# Patient Record
Sex: FEMALE | Race: WHITE | NOT HISPANIC OR LATINO | Employment: UNEMPLOYED | URBAN - METROPOLITAN AREA
[De-identification: names, ages, dates, MRNs, and addresses within clinical notes are randomized per-mention and may not be internally consistent; named-entity substitution may affect disease eponyms.]

---

## 2018-01-09 ENCOUNTER — HOSPITAL ENCOUNTER (EMERGENCY)
Facility: HOSPITAL | Age: 28
Discharge: HOME | End: 2018-01-09
Attending: EMERGENCY MEDICINE | Admitting: EMERGENCY MEDICINE
Payer: COMMERCIAL

## 2018-01-09 ENCOUNTER — APPOINTMENT (EMERGENCY)
Dept: RADIOLOGY | Facility: HOSPITAL | Age: 28
End: 2018-01-09
Payer: COMMERCIAL

## 2018-01-09 VITALS
HEART RATE: 79 BPM | TEMPERATURE: 98.1 F | HEIGHT: 62 IN | BODY MASS INDEX: 36.8 KG/M2 | OXYGEN SATURATION: 100 % | SYSTOLIC BLOOD PRESSURE: 126 MMHG | DIASTOLIC BLOOD PRESSURE: 91 MMHG | WEIGHT: 200 LBS | RESPIRATION RATE: 20 BRPM

## 2018-01-09 DIAGNOSIS — J45.901 ACUTE ASTHMA EXACERBATION: Primary | ICD-10-CM

## 2018-01-09 LAB
ALBUMIN SERPL BCP-MCNC: 3.9 G/DL (ref 3.5–5)
ALP SERPL-CCNC: 69 U/L (ref 46–116)
ALT SERPL W P-5'-P-CCNC: 57 U/L (ref 12–78)
ANION GAP SERPL CALCULATED.3IONS-SCNC: 10 MMOL/L (ref 4–13)
AST SERPL W P-5'-P-CCNC: 31 U/L (ref 5–45)
ATRIAL RATE: 80 BPM
BASOPHILS # BLD AUTO: 0.04 THOUSANDS/ΜL (ref 0–0.1)
BASOPHILS NFR BLD AUTO: 1 % (ref 0–1)
BILIRUB SERPL-MCNC: 0.3 MG/DL (ref 0.2–1)
BUN SERPL-MCNC: 12 MG/DL (ref 5–25)
CALCIUM SERPL-MCNC: 9.8 MG/DL (ref 8.3–10.1)
CHLORIDE SERPL-SCNC: 102 MMOL/L (ref 100–108)
CO2 SERPL-SCNC: 26 MMOL/L (ref 21–32)
CREAT SERPL-MCNC: 0.62 MG/DL (ref 0.6–1.3)
EOSINOPHIL # BLD AUTO: 0.25 THOUSAND/ΜL (ref 0–0.61)
EOSINOPHIL NFR BLD AUTO: 3 % (ref 0–6)
ERYTHROCYTE [DISTWIDTH] IN BLOOD BY AUTOMATED COUNT: 13.6 % (ref 11.6–15.1)
EXT PREG TEST URINE: NEGATIVE
GFR SERPL CREATININE-BSD FRML MDRD: 124 ML/MIN/1.73SQ M
GLUCOSE SERPL-MCNC: 97 MG/DL (ref 65–140)
HCT VFR BLD AUTO: 41.8 % (ref 34.8–46.1)
HGB BLD-MCNC: 13.9 G/DL (ref 11.5–15.4)
LYMPHOCYTES # BLD AUTO: 1.58 THOUSANDS/ΜL (ref 0.6–4.47)
LYMPHOCYTES NFR BLD AUTO: 18 % (ref 14–44)
MCH RBC QN AUTO: 27.4 PG (ref 26.8–34.3)
MCHC RBC AUTO-ENTMCNC: 33.3 G/DL (ref 31.4–37.4)
MCV RBC AUTO: 82 FL (ref 82–98)
MONOCYTES # BLD AUTO: 0.74 THOUSAND/ΜL (ref 0.17–1.22)
MONOCYTES NFR BLD AUTO: 8 % (ref 4–12)
NEUTROPHILS # BLD AUTO: 6.22 THOUSANDS/ΜL (ref 1.85–7.62)
NEUTS SEG NFR BLD AUTO: 70 % (ref 43–75)
NRBC BLD AUTO-RTO: 0 /100 WBCS
P AXIS: 6 DEGREES
PLATELET # BLD AUTO: 224 THOUSANDS/UL (ref 149–390)
PMV BLD AUTO: 9.8 FL (ref 8.9–12.7)
POTASSIUM SERPL-SCNC: 4.5 MMOL/L (ref 3.5–5.3)
PR INTERVAL: 134 MS
PROT SERPL-MCNC: 7.9 G/DL (ref 6.4–8.2)
QRS AXIS: 32 DEGREES
QRSD INTERVAL: 70 MS
QT INTERVAL: 356 MS
QTC INTERVAL: 410 MS
RBC # BLD AUTO: 5.08 MILLION/UL (ref 3.81–5.12)
SODIUM SERPL-SCNC: 138 MMOL/L (ref 136–145)
T WAVE AXIS: 38 DEGREES
TROPONIN I SERPL-MCNC: <0.02 NG/ML
VENTRICULAR RATE: 80 BPM
WBC # BLD AUTO: 8.85 THOUSAND/UL (ref 4.31–10.16)

## 2018-01-09 PROCEDURE — 80053 COMPREHEN METABOLIC PANEL: CPT | Performed by: EMERGENCY MEDICINE

## 2018-01-09 PROCEDURE — 36415 COLL VENOUS BLD VENIPUNCTURE: CPT | Performed by: EMERGENCY MEDICINE

## 2018-01-09 PROCEDURE — 93005 ELECTROCARDIOGRAM TRACING: CPT | Performed by: EMERGENCY MEDICINE

## 2018-01-09 PROCEDURE — 85025 COMPLETE CBC W/AUTO DIFF WBC: CPT | Performed by: EMERGENCY MEDICINE

## 2018-01-09 PROCEDURE — 81025 URINE PREGNANCY TEST: CPT | Performed by: EMERGENCY MEDICINE

## 2018-01-09 PROCEDURE — 94640 AIRWAY INHALATION TREATMENT: CPT

## 2018-01-09 PROCEDURE — 93005 ELECTROCARDIOGRAM TRACING: CPT

## 2018-01-09 PROCEDURE — 71046 X-RAY EXAM CHEST 2 VIEWS: CPT

## 2018-01-09 PROCEDURE — 99285 EMERGENCY DEPT VISIT HI MDM: CPT

## 2018-01-09 PROCEDURE — 84484 ASSAY OF TROPONIN QUANT: CPT | Performed by: EMERGENCY MEDICINE

## 2018-01-09 RX ORDER — ALBUTEROL SULFATE 90 UG/1
2 AEROSOL, METERED RESPIRATORY (INHALATION) EVERY 4 HOURS PRN
Qty: 1 INHALER | Refills: 0 | Status: SHIPPED | OUTPATIENT
Start: 2018-01-09 | End: 2019-10-01 | Stop reason: ALTCHOICE

## 2018-01-09 RX ORDER — ALBUTEROL SULFATE 2.5 MG/3ML
5 SOLUTION RESPIRATORY (INHALATION) ONCE
Status: COMPLETED | OUTPATIENT
Start: 2018-01-09 | End: 2018-01-09

## 2018-01-09 RX ORDER — ALBUTEROL SULFATE 2.5 MG/3ML
2.5 SOLUTION RESPIRATORY (INHALATION) EVERY 6 HOURS PRN
Qty: 75 ML | Refills: 0 | Status: SHIPPED | OUTPATIENT
Start: 2018-01-09 | End: 2019-10-01 | Stop reason: ALTCHOICE

## 2018-01-09 RX ORDER — PREDNISONE 20 MG/1
60 TABLET ORAL ONCE
Status: COMPLETED | OUTPATIENT
Start: 2018-01-09 | End: 2018-01-09

## 2018-01-09 RX ORDER — PREDNISONE 20 MG/1
40 TABLET ORAL DAILY
Qty: 8 TABLET | Refills: 0 | Status: SHIPPED | OUTPATIENT
Start: 2018-01-09 | End: 2018-01-13

## 2018-01-09 RX ADMIN — ALBUTEROL SULFATE 5 MG: 2.5 SOLUTION RESPIRATORY (INHALATION) at 11:10

## 2018-01-09 RX ADMIN — PREDNISONE 60 MG: 20 TABLET ORAL at 11:09

## 2018-01-09 RX ADMIN — IPRATROPIUM BROMIDE 0.5 MG: 0.5 SOLUTION RESPIRATORY (INHALATION) at 11:10

## 2018-01-09 NOTE — ED NOTES
Pt up in bed  Instructed on plan of care  Pt verbalizes understanding  Support provided       Yasmeen Balderrama, RN  01/09/18 7898

## 2018-01-09 NOTE — DISCHARGE INSTRUCTIONS
Asthma   WHAT YOU NEED TO KNOW:   Asthma is a lung disease that makes breathing difficult  Chronic inflammation and reactions to triggers narrow the airways in the lungs  Asthma can become life-threatening if it is not managed  DISCHARGE INSTRUCTIONS:   Return to the emergency department if:   · You have severe shortness of breath  · Your lips or nails turn blue or gray  · The skin around your neck and ribs pulls in with each breath  · You have shortness of breath, even after you take your short-term medicine as directed  · Your peak flow numbers are in the red zone of your AAP  Contact your healthcare provider if:   · You run out of medicine before your next refill is due  · Your symptoms get worse  · You need to take more medicine than usual to control your symptoms  · You have questions or concerns about your condition or care  Medicines:   · Medicines  decrease inflammation, open airways, and make it easier to breathe  Medicines may be inhaled, taken as a pill, or injected  Short-term medicines relieve your symptoms quickly  Long-term medicines are used to prevent future attacks  You may also need medicine to help control your allergies  Ask your healthcare provider for more information about the medicine you are given and how to take it safely  · Take your medicine as directed  Contact your healthcare provider if you think your medicine is not helping or if you have side effects  Tell him of her if you are allergic to any medicine  Keep a list of the medicines, vitamins, and herbs you take  Include the amounts, and when and why you take them  Bring the list or the pill bottles to follow-up visits  Carry your medicine list with you in case of an emergency  Follow up with your healthcare provider as directed: You will need to return to make sure your medicine is working and your symptoms are controlled   You may be referred to an asthma specialist  Willard Rader may be asked to keep a record of your peak flow values and bring it with you to your appointments  Write down your questions so you remember to ask them during your visits  Manage your symptoms and prevent future attacks:   · Follow your Asthma Action Plan (AAP)  This is a written plan that you and your healthcare provider create  It explains which medicine you need and when to change doses if necessary  It also explains how you can monitor symptoms and use a peak flow meter  The meter measures how well your lungs are working  · Manage other health conditions , such as allergies, acid reflux, and sleep apnea  · Identify and avoid triggers  These may include pets, dust mites, mold, and cockroaches  · Do not smoke or be around others who smoke  Nicotine and other chemicals in cigarettes and cigars can cause lung damage  Ask your healthcare provider for information if you currently smoke and need help to quit  E-cigarettes or smokeless tobacco still contain nicotine  Talk to your healthcare provider before you use these products  · Ask about the flu vaccine  The flu can make your asthma worse  You may need a yearly flu shot  © 2017 [address] [facility] Information is for End User's use only and may not be sold, redistributed or otherwise used for commercial purposes  All illustrations and images included in CareNotes® are the copyrighted property of A D A M , Inc  or Wolfgang Tracy  The above information is an  only  It is not intended as medical advice for individual conditions or treatments  Talk to your doctor, nurse or pharmacist before following any medical regimen to see if it is safe and effective for you

## 2018-01-28 NOTE — ED PROVIDER NOTES
History  Chief Complaint   Patient presents with    Chest Pain     Pt stated that she has been having on and off chest pain for the past two weeks  This is a 31 y/o female that presents to the ED with c/o chest pain  Also with cough and wheezing  Chest pain worse with cough  No fevers  Hx of asthma  Symptoms presents for the past two weeks  Also c/o intermittent arm tingling with symptoms  No weakness  Symptoms are moderate in severity  No aggravating or alleviating factors  No other associated factors  Non-exertional              None       Past Medical History:   Diagnosis Date    Asthma        Past Surgical History:   Procedure Laterality Date     SECTION         History reviewed  No pertinent family history  I have reviewed and agree with the history as documented  Social History   Substance Use Topics    Smoking status: Current Some Day Smoker     Packs/day: 0 25    Smokeless tobacco: Never Used    Alcohol use No        Review of Systems   Constitutional: Negative for activity change, appetite change and fever  HENT: Negative for congestion, ear pain, rhinorrhea and sore throat  Eyes: Negative for pain, discharge and redness  Respiratory: Positive for cough, chest tightness, shortness of breath and wheezing  Cardiovascular: Positive for chest pain  Negative for palpitations  Gastrointestinal: Negative for abdominal pain, diarrhea, nausea and vomiting  Endocrine: Negative for polyuria  Genitourinary: Negative for difficulty urinating, dysuria, frequency and urgency  Musculoskeletal: Negative for arthralgias and myalgias  Skin: Negative for color change and rash  Allergic/Immunologic: Negative for immunocompromised state  Neurological: Positive for numbness  Negative for dizziness, syncope and light-headedness  Headaches: tingling  Hematological: Does not bruise/bleed easily  Psychiatric/Behavioral: Negative for confusion     All other systems reviewed and are negative  Physical Exam  ED Triage Vitals [01/09/18 0910]   Temperature Pulse Respirations Blood Pressure SpO2   98 1 °F (36 7 °C) 88 16 154/93 99 %      Temp Source Heart Rate Source Patient Position - Orthostatic VS BP Location FiO2 (%)   Oral Monitor Sitting Right arm --      Pain Score       5           Orthostatic Vital Signs  Vitals:    01/09/18 0910 01/09/18 1210 01/09/18 1300   BP: 154/93 126/91    Pulse: 88 91 79   Patient Position - Orthostatic VS: Sitting Sitting        Physical Exam   Constitutional: She is oriented to person, place, and time  She appears well-developed  No distress  HENT:   Head: Normocephalic and atraumatic  Nose: Nose normal    Eyes: Conjunctivae are normal  No scleral icterus  Neck: Normal range of motion  Neck supple  Cardiovascular: Normal rate, regular rhythm, normal heart sounds and intact distal pulses  Pulmonary/Chest: Effort normal  No stridor  No respiratory distress  She has wheezes  Abdominal: Soft  She exhibits no distension  There is no tenderness  There is no rebound and no guarding  Musculoskeletal: She exhibits no edema or deformity  Neurological: She is alert and oriented to person, place, and time  Skin: Skin is warm and dry  No rash noted  Psychiatric: She has a normal mood and affect  Thought content normal    Nursing note and vitals reviewed        ED Medications  Medications   albuterol inhalation solution 5 mg (5 mg Nebulization Given 1/9/18 1110)   ipratropium (ATROVENT) 0 02 % inhalation solution 0 5 mg (0 5 mg Nebulization Given 1/9/18 1110)   predniSONE tablet 60 mg (60 mg Oral Given 1/9/18 1109)       Diagnostic Studies  Results Reviewed     Procedure Component Value Units Date/Time    Troponin I [[id number]]  (Normal) Collected:  01/09/18 1046    Lab Status:  Final result Specimen:  Blood from Arm, Left Updated:  01/09/18 1125     Troponin I <0 02 ng/mL     Narrative:         [employer] Chemistry analyzer 99% cutoff is > 0 04 ng/mL in network labs    o cTnI 99% cutoff is useful only when applied to patients in the clinical setting of myocardial ischemia  o cTnI 99% cutoff should be interpreted in the context of clinical history, ECG findings and possibly cardiac imaging to establish correct diagnosis  o cTnI 99% cutoff may be suggestive but clearly not indicative of a coronary event without the clinical setting of myocardial ischemia  Comprehensive metabolic panel [[id number]] Collected:  01/09/18 1046    Lab Status:  Final result Specimen:  Blood from Arm, Left Updated:  01/09/18 1123     Sodium 138 mmol/L      Potassium 4 5 mmol/L      Chloride 102 mmol/L      CO2 26 mmol/L      Anion Gap 10 mmol/L      BUN 12 mg/dL      Creatinine 0 62 mg/dL      Glucose 97 mg/dL      Calcium 9 8 mg/dL      AST 31 U/L      ALT 57 U/L      Alkaline Phosphatase 69 U/L      Total Protein 7 9 g/dL      Albumin 3 9 g/dL      Total Bilirubin 0 30 mg/dL      eGFR 124 ml/min/1 73sq m     Narrative:         National Kidney Disease Education Program recommendations are as follows:  GFR calculation is accurate only with a steady state creatinine  Chronic Kidney disease less than 60 ml/min/1 73 sq  meters  Kidney failure less than 15 ml/min/1 73 sq  meters      CBC and differential [[id number]]  (Normal) Collected:  01/09/18 1046    Lab Status:  Final result Specimen:  Blood from Arm, Left Updated:  01/09/18 1052     WBC 8 85 Thousand/uL      RBC 5 08 Million/uL      Hemoglobin 13 9 g/dL      Hematocrit 41 8 %      MCV 82 fL      MCH 27 4 pg      MCHC 33 3 g/dL      RDW 13 6 %      MPV 9 8 fL      Platelets 229 Thousands/uL      nRBC 0 /100 WBCs      Neutrophils Relative 70 %      Lymphocytes Relative 18 %      Monocytes Relative 8 %      Eosinophils Relative 3 %      Basophils Relative 1 %      Neutrophils Absolute 6 22 Thousands/µL      Lymphocytes Absolute 1 58 Thousands/µL      Monocytes Absolute 0 74 Thousand/µL      Eosinophils Absolute 0 25 Thousand/µL Basophils Absolute 0 04 Thousands/µL     POCT pregnancy, urine [[id number]]  (Normal) Resulted:  01/09/18 1040    Lab Status:  Final result Updated:  01/09/18 1040     EXT PREG TEST UR (Ref: Negative) negative                 X-ray chest 2 views   Final Result by Veena Mata MD (01/09 1104)      No active pulmonary disease  Workstation performed: [id number]                    Procedures  Procedures       Phone Contacts  ED Phone Contact    ED Course  ED Course                                MDM  CritCare Time    Disposition  Final diagnoses:   Acute asthma exacerbation     Time reflects when diagnosis was documented in both MDM as applicable and the Disposition within this note     Time User Action Codes Description Comment    1/9/2018 12:52 PM [place] Later Add [L21 024] Acute asthma exacerbation       ED Disposition     ED Disposition Condition Comment    Discharge  THE [facility] discharge to home/self care  Condition at discharge: Stable        Follow-up Information     Follow up With Specialties Details Why Contact Info    Juanpablo Santana MD  In 1 week If symptoms worsen [address] [place] [address] 16  [phone]          Discharge Medication List as of 1/9/2018 12:54 PM      START taking these medications    Details   albuterol (2 5 mg/3 mL) 0 083 % nebulizer solution Take 3 mL by nebulization every 6 (six) hours as needed for wheezing, Starting Tue 1/9/2018, Print      albuterol (PROVENTIL HFA,VENTOLIN HFA) 90 mcg/act inhaler Inhale 2 puffs every 4 (four) hours as needed for wheezing, Starting Tue 1/9/2018, Print      predniSONE 20 mg tablet Take 2 tablets by mouth daily for 4 days, Starting Tue 1/9/2018, Until Sat 1/13/2018, Print           No discharge procedures on file      ED Provider  Electronically Signed by           Ata Jensen MD  01/28/18 7409

## 2018-02-12 ENCOUNTER — HOSPITAL ENCOUNTER (EMERGENCY)
Facility: HOSPITAL | Age: 28
Discharge: HOME | End: 2018-02-12
Attending: EMERGENCY MEDICINE | Admitting: EMERGENCY MEDICINE
Payer: COMMERCIAL

## 2018-02-12 VITALS
RESPIRATION RATE: 16 BRPM | OXYGEN SATURATION: 99 % | WEIGHT: 200 LBS | SYSTOLIC BLOOD PRESSURE: 128 MMHG | HEIGHT: 62 IN | TEMPERATURE: 98.1 F | BODY MASS INDEX: 36.8 KG/M2 | DIASTOLIC BLOOD PRESSURE: 82 MMHG | HEART RATE: 75 BPM

## 2018-02-12 DIAGNOSIS — S09.90XA MILD CLOSED HEAD INJURY, INITIAL ENCOUNTER: ICD-10-CM
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
## 2018-02-12 DIAGNOSIS — S13.4XXA WHIPLASH INJURIES, INITIAL ENCOUNTER: ICD-10-CM

## 2018-02-12 DIAGNOSIS — M62.838 MUSCLE SPASM: ICD-10-CM

## 2018-02-12 PROCEDURE — 99283 EMERGENCY DEPT VISIT LOW MDM: CPT

## 2018-02-12 RX ORDER — CYCLOBENZAPRINE HCL 5 MG
5 TABLET ORAL 3 TIMES DAILY PRN
Qty: 21 TABLET | Refills: 0 | Status: SHIPPED | OUTPATIENT
Start: 2018-02-12 | End: 2019-02-01

## 2018-02-12 NOTE — DISCHARGE INSTRUCTIONS
Take ibuprofen (Motrin, Advil) or acetaminophen (Tylenol) as needed for pain, as per the instructions  Use the prescribed muscle relaxer as needed for continued severe pain  Use ice the first 24 hours, and heat thereafter  Use topical muscle rubs, such as Ramez-Tyler, or icy Hot to the area, to help with the pain  Do gentle stretching exercises to keep the muscles loose  Muscle Spasm   WHAT YOU NEED TO KNOW:   A muscle spasm is a sudden contraction of any muscle or group of muscles  A muscle cramp is a painful muscle spasm  Muscle cramps commonly occur after intense exercise or during pregnancy  They may also be caused by certain medications, dehydration, low calcium or magnesium levels, or another medical condition  DISCHARGE INSTRUCTIONS:   Medicines: You may need the following:  · NSAIDs  help decrease swelling and pain or fever  This medicine is available with or without a doctor's order  NSAIDs can cause stomach bleeding or kidney problems in certain people  If you take blood thinner medicine, always ask your healthcare provider if NSAIDs are safe for you  Always read the medicine label and follow directions  · Take your medicine as directed  Contact your healthcare provider if you think your medicine is not helping or if you have side effects  Tell him of her if you are allergic to any medicine  Keep a list of the medicines, vitamins, and herbs you take  Include the amounts, and when and why you take them  Bring the list or the pill bottles to follow-up visits  Carry your medicine list with you in case of an emergency  Follow up with your healthcare provider as directed: You may need other tests or treatment  You may also be referred to a physical therapist or other specialist  Write down your questions so you remember to ask them during your visits  Self-care:   · Stretch  your muscle to help relieve the cramp   It may be helpful to keep your muscle in the stretched position until the cramp is gone      · Apply heat  to help decrease pain and muscle spasms  Apply heat on the area for 20 to 30 minutes every 2 hours for as many days as directed  · Apply ice  to help decrease swelling and pain  Ice may also help prevent tissue damage  Use an ice pack, or put crushed ice in a plastic bag  Cover it with a towel and place it on your muscle for 15 to 20 minutes every hour or as directed  · Drink more liquids  to help prevent muscle cramps caused by dehydration  Sports drinks may help replace electrolytes you lose through sweat during exercise  Ask your healthcare provider how much liquid to drink each day and which liquids are best for you  · Eat healthy foods , such as fruits, vegetables, whole grains, low-fat dairy products, and lean proteins (meat, beans, and fish)  If you are pregnant, ask your healthcare provider about foods that are high in magnesium and sodium  They may help to relieve cramps during pregnancy  · Massage your muscle  to help relieve the cramp  · Take frequent deep breaths  until the cramp feels better  Lie down while you take the deep breaths so you do not get dizzy or lightheaded  Contact your healthcare provider if:   · You have signs of dehydration, such as a headache, dark yellow urine, dry eyes or mouth, or a fast heartbeat  · You have questions or concerns about your condition or care  Return to the emergency department if:   · You have warmth, swelling, or redness in the cramping muscle  · You have frequent or unrelieved muscle cramps in several different muscles  · You have muscle cramps with numbness, tingling, and burning in your hands and feet  © 2017 [address] [facility] Information is for End User's use only and may not be sold, redistributed or otherwise used for commercial purposes  All illustrations and images included in CareNotes® are the copyrighted property of [employer]  or Wolfgang Tracy    The above information is an  only  It is not intended as medical advice for individual conditions or treatments  Talk to your doctor, nurse or pharmacist before following any medical regimen to see if it is safe and effective for you  Post Concussion Syndrome   WHAT YOU NEED TO KNOW:   Post-concussion syndrome (PCS) is a group of symptoms that affect your nerves, thinking, and behavior  PCS develops shortly after a concussion and can last for weeks to months  DISCHARGE INSTRUCTIONS:   Call 911 or have someone else call for any of the following:   · You have a seizure  · You have trouble breathing  · You are not responding or you cannot be woken  Return to the emergency department if:   · You have a sudden headache that seems different or much worse than your usual headaches  · You cannot stop vomiting  · You have sudden changes in your vision  Contact your healthcare provider if:   · You have nausea or are vomiting  · You have trouble concentrating  · You have difficulty speaking or thinking  · Your symptoms get worse  · You have questions or concerns about your condition or care  Medicines: You may  need any of the following:  · Acetaminophen  decreases pain  It is available without a doctor's order  Ask how much to take and how often to take it  Follow directions  Acetaminophen can cause liver damage if not taken correctly  · NSAIDs,  such as ibuprofen, help decrease swelling, pain, and fever  This medicine is available without a doctor's order  Follow directions  NSAIDs can cause stomach bleeding or kidney problems if not taken correctly  If you take blood thinner medicine, always ask if NSAIDs are safe for you  · Antidepressants  may be given for depression or sleep problems  · Migraine medicines  may be given for migraine headaches  · NSAIDs , such as ibuprofen, help decrease swelling, pain, and fever  This medicine is available with or without a doctor's order  NSAIDs can cause stomach bleeding or kidney problems in certain people  If you take blood thinner medicine, always ask if NSAIDs are safe for you  Always read the medicine label and follow directions  Do not give these medicines to children under 10months of age without direction from your child's healthcare provider  Follow up with your healthcare provider as directed: Your healthcare provider may refer you to psychiatrist, a neurologist, or a substance abuse counselor  Write down your questions so you remember to ask them during your visits  Prevent PCS:   · Make your home safe  Home safety measures can help prevent head injuries that could lead to a concussion  Install handrails for every staircase  Put soft bumpers on furniture edges and corners  Secure furniture, such as dressers and book cases so they do not fall over  · Always wear a seatbelt in the car  This helps decrease your risk for a head injury if you are in a car accident  · Wear protective sports equipment that fits properly  Helmets help decrease your risk for a serious brain injury  Talk to your healthcare provider about other ways that you can decrease your risk for a concussion if you play sports  Manage your symptoms:   · Rest  from physical and mental activities as directed  Mental activities need you to think, concentrate, and pay attention  Rest will help you recover from your concussion  Ask your healthcare provider when you can return to school and other daily activities  · Go to therapy  as directed  A cognitive behavioral therapist teaches you skills to help with any thinking and behavior problems you may have  An occupational therapist teaches your skills to help with daily activities  · Do not participate in sports or physical activities  until your healthcare provider says it is okay  These activities could make your symptoms worse or lead to another concussion   Your healthcare provider will tell you when it is okay to return to sports or physical activities  © 2017 [address] [employer] Information is for End User's use only and may not be sold, redistributed or otherwise used for commercial purposes  All illustrations and images included in CareNotes® are the copyrighted property of [employer] M [employer] Inc  or Wolfgang Tracy  The above information is an  only  It is not intended as medical advice for individual conditions or treatments  Talk to your doctor, nurse or pharmacist before following any medical regimen to see if it is safe and effective for you

## 2018-02-12 NOTE — ED PROVIDER NOTES
History  Chief Complaint   Patient presents with    Fall     pt slipped on ice and fell  Pt hit her head  c/o left shoulder and neck pain  Denies loc  HPI    Prior to Admission Medications   Prescriptions Last Dose Informant Patient Reported? Taking? albuterol (2 5 mg/3 mL) 0 083 % nebulizer solution   No No   Sig: Take 3 mL by nebulization every 6 (six) hours as needed for wheezing   albuterol (PROVENTIL HFA,VENTOLIN HFA) 90 mcg/act inhaler   No No   Sig: Inhale 2 puffs every 4 (four) hours as needed for wheezing      Facility-Administered Medications: None       Past Medical History:   Diagnosis Date    Asthma        Past Surgical History:   Procedure Laterality Date     SECTION         History reviewed  No pertinent family history  I have reviewed and agree with the history as documented  Social History   Substance Use Topics    Smoking status: Former Smoker     Packs/day: 0 25    Smokeless tobacco: Never Used    Alcohol use No      Comment: occ        Review of Systems    Physical Exam  ED Triage Vitals [18 1116]   Temperature Pulse Respirations Blood Pressure SpO2   98 1 °F (36 7 °C) 84 18 (!) 158/102 98 %      Temp Source Heart Rate Source Patient Position - Orthostatic VS BP Location FiO2 (%)   Oral Monitor Sitting Right arm --      Pain Score       6           Orthostatic Vital Signs  Vitals:    18 1116   BP: (!) 158/102   Pulse: 84   Patient Position - Orthostatic VS: Sitting       Physical Exam   Constitutional: She is oriented to person, place, and time  She appears well-developed and well-nourished  No distress  HENT:   Head: Normocephalic and atraumatic  Mouth/Throat: Oropharynx is clear and moist    Eyes: Conjunctivae and EOM are normal  Pupils are equal, round, and reactive to light  Neck: Normal range of motion and full passive range of motion without pain  Neck supple  Muscular tenderness present  No spinous process tenderness present   No tracheal deviation present  Cardiovascular: Normal rate, regular rhythm and intact distal pulses  Pulses:       Radial pulses are 2+ on the right side  Pulmonary/Chest: Effort normal  No respiratory distress  She exhibits no tenderness, no crepitus, no deformity and no retraction  Abdominal: Soft  She exhibits no distension  There is no tenderness  Musculoskeletal: Normal range of motion  She exhibits no deformity  Left shoulder: She exhibits tenderness and spasm  She exhibits normal range of motion and no bony tenderness  Thoracic back: She exhibits tenderness and spasm  She exhibits no bony tenderness  Back:         Arms:  Neurological: She is alert and oriented to person, place, and time  She has normal strength  No cranial nerve deficit or sensory deficit  GCS eye subscore is 4  GCS verbal subscore is 5  GCS motor subscore is 6  Skin: Skin is warm and dry  No abrasion, no bruising, no ecchymosis and no laceration noted  Psychiatric: She has a normal mood and affect  Her behavior is normal    Nursing note and vitals reviewed  ED Medications  Medications - No data to display    Diagnostic Studies  Results Reviewed     None                 No orders to display              Procedures  Procedures       Phone Contacts  ED Phone Contact    ED Course  ED Course                                MDM  Number of Diagnoses or Management Options  Fall due to slipping on ice or snow, initial encounter: new and does not require workup  Mild closed head injury, initial encounter: new and does not require workup  Muscle spasm: new and does not require workup  Whiplash injuries, initial encounter: new and does not require workup  Diagnosis management comments: This is a 68-year-old female who presents here today after a fall  At about 0830 this morning she slipped on ice in her driveway and landed on her back  She thinks she might have hit her head on the ground    She is endorsing pain primarily in her left posterior shoulder, lower neck, and upper back  She denies loss of consciousness, has had nausea which is improving, but no vomiting  She has no vision changes, focal weakness or numbness  She denies pain in her chest or abdomen  She has no underlying medical problems and does not take any blood thinning medications  She took 400 milligrams of Motrin shortly after the fall which did not help  She is here because of continued pain  ROS: Otherwise negative, unless stated as above  She is well-appearing, in no acute distress  She has tenderness and muscle spasm to the base of her left neck, trapezius, and thoracic back  There is no midline tenderness or focal bony tenderness  The tenderness to her left shoulder is over the posterior musculature  There is no bony tenderness to this area, or no decreased range of motion  There are no food focal neurologic deficits  This is most consistent with whiplash injury and muscle strain with spasm from the fall  I am not concerned about underlying bony injury or intracranial hemorrhage  I do not feel that any imaging is indicated at this point in time  I discussed with the patient and family treatment at home, follow-up, and indications for return, and they expressed understanding with this plan  CritCare Time    Disposition  Final diagnoses:   Fall due to slipping on ice or snow, initial encounter   Whiplash injuries, initial encounter   Muscle spasm   Mild closed head injury, initial encounter     Time reflects when diagnosis was documented in both MDM as applicable and the Disposition within this note     Time User Action Codes Description Comment    2/12/2018 12:32 PM Faith Ledesma Add [W00  9XXA] Fall due to slipping on ice or snow, initial encounter     2/12/2018 12:35 PM Faith Ledesma Add [S13  4XXA] Whiplash injuries, initial encounter     2/12/2018 12:35 PM Faith Ledesma Add [X80 038] Muscle spasm     2/12/2018 12:37 PM Callie Shania Ring Add [S09 90XA] Mild closed head injury, initial encounter       ED Disposition     ED Disposition Condition Comment    Discharge  [facility] [place] discharge to home/self care  Condition at discharge: Good        Follow-up Information     Follow up With Specialties Details Why Contact Tevin Kent MD  Schedule an appointment as soon as possible for a visit in 3 days As needed [address] [place] [address] 16  [phone]          Patient's Medications   Discharge Prescriptions    CYCLOBENZAPRINE (FLEXERIL) 5 MG TABLET    Take 1 tablet (5 mg total) by mouth 3 (three) times a day as needed for muscle spasms       Start Date: 2/12/2018 End Date: --       Order Dose: 5 mg       Quantity: 21 tablet    Refills: 0     No discharge procedures on file      ED Provider  Electronically Signed by           Zoya Fonseca MD  02/15/18 1204

## 2018-02-26 ENCOUNTER — OFFICE VISIT (OUTPATIENT)
Dept: URGENT CARE | Facility: CLINIC | Age: 28
End: 2018-02-26
Payer: COMMERCIAL

## 2018-02-26 VITALS
WEIGHT: 202.6 LBS | DIASTOLIC BLOOD PRESSURE: 73 MMHG | TEMPERATURE: 98.7 F | HEIGHT: 62 IN | RESPIRATION RATE: 16 BRPM | BODY MASS INDEX: 37.28 KG/M2 | OXYGEN SATURATION: 97 % | SYSTOLIC BLOOD PRESSURE: 111 MMHG | HEART RATE: 113 BPM

## 2018-02-26 DIAGNOSIS — R50.9 FEVER, UNSPECIFIED FEVER CAUSE: Primary | ICD-10-CM

## 2018-02-26 PROCEDURE — G0382 LEV 3 HOSP TYPE B ED VISIT: HCPCS | Performed by: PHYSICIAN ASSISTANT

## 2018-02-26 PROCEDURE — 87798 DETECT AGENT NOS DNA AMP: CPT | Performed by: PHYSICIAN ASSISTANT

## 2018-02-26 PROCEDURE — 99283 EMERGENCY DEPT VISIT LOW MDM: CPT | Performed by: PHYSICIAN ASSISTANT

## 2018-02-26 RX ORDER — OSELTAMIVIR PHOSPHATE 75 MG/1
75 CAPSULE ORAL 2 TIMES DAILY
Qty: 10 CAPSULE | Refills: 0 | Status: SHIPPED | OUTPATIENT
Start: 2018-02-26 | End: 2018-03-03

## 2018-02-26 NOTE — PROGRESS NOTES
[facility]        NAME: Tucker Haywood is a 29 y o  female  : 1990    MRN: [id number]  DATE: 2018  TIME: 10:30 AM    Assessment and Plan   Fever, unspecified fever cause [R50 9]  1  Fever, unspecified fever cause  oseltamivir (TAMIFLU) 75 mg capsule    Influenza A/B and RSV by PCR (Indicated for patients > 2 mo of age)      benign exam here  Patient Instructions       Alternate Tylenol and Motrin for fever  Continue DayQuil and NyQuil  We will check a flu swab and can start Tamiflu  I will call with flu results  Follow up with PCP in 3-5 days  Proceed to  ER if symptoms worsen  Chief Complaint     Chief Complaint   Patient presents with    Cough     started saturday    Fever     started saturday, tmax 102 3         History of Present Illness         29year-old female complains of fever body aches cough and congestion for 2 days  Her daughter is here and she is sick also  No flu shot this year  She took Tylenol this morning  No nausea vomiting  She had loose stool twice yesterday but normal bowel movement today  Tolerating p o    No rashes        Review of Systems   Review of Systems      Current Medications       Current Outpatient Prescriptions:     albuterol (2 5 mg/3 mL) 0 083 % nebulizer solution, Take 3 mL by nebulization every 6 (six) hours as needed for wheezing, Disp: 75 mL, Rfl: 0    albuterol (PROVENTIL HFA,VENTOLIN HFA) 90 mcg/act inhaler, Inhale 2 puffs every 4 (four) hours as needed for wheezing, Disp: 1 Inhaler, Rfl: 0    cyclobenzaprine (FLEXERIL) 5 mg tablet, Take 1 tablet (5 mg total) by mouth 3 (three) times a day as needed for muscle spasms, Disp: 21 tablet, Rfl: 0    oseltamivir (TAMIFLU) 75 mg capsule, Take 1 capsule (75 mg total) by mouth 2 (two) times a day for 5 days, Disp: 10 capsule, Rfl: 0    Current Allergies     Allergies as of 2018 - Reviewed 2018   Allergen Reaction Noted    Apple Anaphylaxis 2017    Nuts Other (See Comments) 2017    Pineapple Anaphylaxis 2017    Shellfish-derived products Anaphylaxis 2017    Other Hives and Rash 2017    Raspberry Hives and Rash 2017    Penicillins Rash 10/06/2013            The following portions of the patient's history were reviewed and updated as appropriate: allergies, current medications, past family history, past medical history, past social history, past surgical history and problem list      Past Medical History:   Diagnosis Date    Asthma        Past Surgical History:   Procedure Laterality Date     SECTION         History reviewed  No pertinent family history  Medications have been verified  Objective   /73   Pulse (!) 113   Temp 98 7 °F (37 1 °C) (Tympanic)   Resp 16   Ht 5' 2" (1 575 m)   Wt 91 9 kg (202 lb 9 6 oz)   SpO2 97%   BMI 37 06 kg/m²        Physical Exam     Physical Exam   Constitutional: She appears well-developed and well-nourished  No distress  HENT:   Right Ear: Tympanic membrane, external ear and ear canal normal    Left Ear: Tympanic membrane, external ear and ear canal normal    Nose: Nose normal  Right sinus exhibits no maxillary sinus tenderness and no frontal sinus tenderness  Left sinus exhibits no maxillary sinus tenderness and no frontal sinus tenderness  Mouth/Throat: Oropharynx is clear and moist  No posterior oropharyngeal erythema  Eyes: Conjunctivae and EOM are normal  Pupils are equal, round, and reactive to light  No scleral icterus  Neck: Normal range of motion  Neck supple  Cardiovascular: Normal rate, regular rhythm and normal heart sounds  Pulmonary/Chest: Effort normal and breath sounds normal  No respiratory distress  She has no wheezes  She has no rales  Abdominal: Soft  Bowel sounds are normal  She exhibits no distension and no mass  There is no tenderness  There is no rebound and no guarding  Lymphadenopathy:     She has no cervical adenopathy  Skin: Skin is warm and dry  No rash noted

## 2018-02-26 NOTE — LETTER
February 26, 2018     Patient: Prosper Khoury   YOB: 1990   Date of Visit: 2/26/2018       To Whom it May Concern:    Prosper Khoury is under my professional care  She was seen in my office on 2/26/2018  She may return to work on 2/28/18  If you have any questions or concerns, please don't hesitate to call           Sincerely,          González Soto PA-C        CC: No Recipients

## 2018-02-26 NOTE — PATIENT INSTRUCTIONS
Alternate Tylenol and Motrin for fever  Continue DayQuil and NyQuil  We will check a flu swab and can start Tamiflu  I will call with flu results  Follow up with PCP in 3-5 days  Proceed to  ER if symptoms worsen  Cold Symptoms, Ambulatory Care   GENERAL INFORMATION:   Cold symptoms  include sneezing, dry throat, a stuffy nose, headache, watery eyes, and a cough  Your cough may be dry, or you may cough up mucus  You may also have muscle aches, joint pain, and tiredness  Rarely, you may have a fever  Cold symptoms occur from inflammation in your upper respiratory system caused by a virus  Most colds go away without treatment  Seek immediate care for the following symptoms:   · A heartbeat that is much faster than usual for you     · A swollen neck that is sore to the touch     · Increased tiredness and weakness    · Pinpoint or larger reddish-purple dots on your skin     · Poor or no appetite  Treatment for cold symptoms  may include NSAIDS to decrease muscle aches and fever  Do not give NSAID medicines to children under 10months of age without direction from your child's doctor  Cold medicines may also be given to decrease coughing, nasal stuffiness, sneezing, and a runny nose  Do not give cold medicines to children under 11years of age without direction from your child's doctor  Manage your cold symptoms with the following:   · Drink liquids  to help thin and loosen thick mucus so you can cough it up  Liquids will also keep you hydrated  Ask your healthcare provider which liquids are best for you and how much to drink each day  · Do not smoke  because it may worsen your symptoms and increase the length of time you feel sick  Talk with your healthcare provider if you need help to stop smoking  Prevent the spread of germs  by washing your hands often  You can spread your cold germs to others for at least 3 days after your symptoms start  Do not share items, such as eating utensils   Cover your nose and mouth when you cough or sneeze using the crook of your elbow instead of your hands  Throw used tissues in the garbage  Follow up with your healthcare provider as directed:  Write down your questions so you remember to ask them during your visits  CARE AGREEMENT:   You have the right to help plan your care  Learn about your health condition and how it may be treated  Discuss treatment options with your caregivers to decide what care you want to receive  You always have the right to refuse treatment  The above information is an  only  It is not intended as medical advice for individual conditions or treatments  Talk to your doctor, nurse or pharmacist before following any medical regimen to see if it is safe and effective for you  © 2014 [address] is for End User's use only and may not be sold, redistributed or otherwise used for commercial purposes  All illustrations and images included in CareNotes® are the copyrighted property of A D A M [employer]  or Wolfgang Tracy

## 2018-02-27 ENCOUNTER — TELEPHONE (OUTPATIENT)
Dept: URGENT CARE | Facility: CLINIC | Age: 28
End: 2018-02-27

## 2018-02-27 LAB
FLUAV AG SPEC QL: DETECTED
FLUBV AG SPEC QL: ABNORMAL
RSV B RNA SPEC QL NAA+PROBE: ABNORMAL

## 2018-02-27 NOTE — TELEPHONE ENCOUNTER
Spoke with Korina Nieto  Made her aware of her flu swab  Discussed supportive care with Tylenol Motrin

## 2018-04-17 ENCOUNTER — APPOINTMENT (OUTPATIENT)
Dept: RADIOLOGY | Facility: CLINIC | Age: 28
End: 2018-04-17
Payer: COMMERCIAL

## 2018-04-17 ENCOUNTER — OFFICE VISIT (OUTPATIENT)
Dept: URGENT CARE | Facility: CLINIC | Age: 28
End: 2018-04-17
Payer: COMMERCIAL

## 2018-04-17 VITALS
TEMPERATURE: 98.8 F | HEIGHT: 62 IN | SYSTOLIC BLOOD PRESSURE: 134 MMHG | WEIGHT: 203 LBS | OXYGEN SATURATION: 95 % | RESPIRATION RATE: 18 BRPM | DIASTOLIC BLOOD PRESSURE: 93 MMHG | HEART RATE: 103 BPM | BODY MASS INDEX: 37.36 KG/M2

## 2018-04-17 DIAGNOSIS — M79.671 ACUTE FOOT PAIN, RIGHT: Primary | ICD-10-CM

## 2018-04-17 PROCEDURE — 99283 EMERGENCY DEPT VISIT LOW MDM: CPT | Performed by: PHYSICIAN ASSISTANT

## 2018-04-17 PROCEDURE — G0382 LEV 3 HOSP TYPE B ED VISIT: HCPCS | Performed by: PHYSICIAN ASSISTANT

## 2018-04-17 PROCEDURE — 73630 X-RAY EXAM OF FOOT: CPT

## 2018-04-17 RX ORDER — IBUPROFEN 600 MG/1
600 TABLET ORAL EVERY 8 HOURS PRN
Qty: 30 TABLET | Refills: 0 | Status: SHIPPED | OUTPATIENT
Start: 2018-04-17 | End: 2019-02-01

## 2018-04-17 NOTE — PATIENT INSTRUCTIONS
Post op shoe applied, roll the foot over frozen bottle  Ibuprofen for inflammation  Follow up with PCP in 3-5 days  Proceed to  ER if symptoms worsen

## 2018-04-17 NOTE — PROGRESS NOTES
[facility]        NAME: Lori Blankenship is a 29 y o  female  : 1990    MRN: [id number]  DATE: 2018  TIME: 10:23 AM    Assessment and Plan   Acute foot pain, right [M79 671]  1  Acute foot pain, right  XR foot 3+ vw right         Patient Instructions     Post op shoe applied, roll the foot over frozen bottle  Ibuprofen for inflammation  Follow up with PCP in 3-5 days  Proceed to  ER if symptoms worsen  Chief Complaint     Chief Complaint   Patient presents with    Foot Pain     right side x2 weeks unknown injury         History of Present Illness       14-year-old female complains of right foot pain off and on for several weeks  She is a  and stands on her feet a lot  She reports pain when she weight bears gets out of bed  Pain on the medial side of her foot  No pain in her heel  No twist or follow-up  No new issues  She worse decrease most of the time  No numbness or tingling          Review of Systems   Review of Systems      Current Medications       Current Outpatient Prescriptions:     albuterol (2 5 mg/3 mL) 0 083 % nebulizer solution, Take 3 mL by nebulization every 6 (six) hours as needed for wheezing, Disp: 75 mL, Rfl: 0    albuterol (PROVENTIL HFA,VENTOLIN HFA) 90 mcg/act inhaler, Inhale 2 puffs every 4 (four) hours as needed for wheezing, Disp: 1 Inhaler, Rfl: 0    cyclobenzaprine (FLEXERIL) 5 mg tablet, Take 1 tablet (5 mg total) by mouth 3 (three) times a day as needed for muscle spasms, Disp: 21 tablet, Rfl: 0    Current Allergies     Allergies as of 2018 - Reviewed 2018   Allergen Reaction Noted    Apple Anaphylaxis 2017    Nuts Other (See Comments) 2017    Pineapple Anaphylaxis 2017    Shellfish-derived products Anaphylaxis 2017    Other Hives and Rash 2017    Raspberry Hives and Rash 2017    Penicillins Rash 10/06/2013            The following portions of the patient's history were reviewed and updated as appropriate: allergies, current medications, past family history, past medical history, past social history, past surgical history and problem list      Past Medical History:   Diagnosis Date    Asthma        Past Surgical History:   Procedure Laterality Date    ADENOIDECTOMY       SECTION      TONSILLECTOMY         History reviewed  No pertinent family history  Medications have been verified  Objective   /93 (BP Location: Right arm, Patient Position: Sitting)   Pulse 103   Temp 98 8 °F (37 1 °C) (Tympanic)   Resp 18   Ht 5' 2" (1 575 m)   Wt 92 1 kg (203 lb)   SpO2 95%   BMI 37 13 kg/m²        Physical Exam     Physical Exam   Musculoskeletal:     Right plantar foot medial aspect mild swelling tender palpation over the medial arch  Tender palpation over the medial navicular  No calcaneal tender palpation no dorsal tenderness  Full range of motion foot and ankle  No weakness  Neurovascular intact toes  XR no fracture

## 2018-09-16 ENCOUNTER — APPOINTMENT (EMERGENCY)
Dept: RADIOLOGY | Facility: HOSPITAL | Age: 28
End: 2018-09-16
Payer: COMMERCIAL

## 2018-09-16 ENCOUNTER — HOSPITAL ENCOUNTER (EMERGENCY)
Facility: HOSPITAL | Age: 28
Discharge: HOME | End: 2018-09-16
Attending: EMERGENCY MEDICINE | Admitting: EMERGENCY MEDICINE
Payer: COMMERCIAL

## 2018-09-16 VITALS
OXYGEN SATURATION: 99 % | HEIGHT: 62 IN | BODY MASS INDEX: 35.94 KG/M2 | SYSTOLIC BLOOD PRESSURE: 132 MMHG | WEIGHT: 195.33 LBS | HEART RATE: 108 BPM | RESPIRATION RATE: 20 BRPM | TEMPERATURE: 98.2 F | DIASTOLIC BLOOD PRESSURE: 79 MMHG

## 2018-09-16 DIAGNOSIS — J45.901 ASTHMA EXACERBATION: Primary | ICD-10-CM

## 2018-09-16 DIAGNOSIS — R06.02 SHORTNESS OF BREATH: ICD-10-CM

## 2018-09-16 DIAGNOSIS — R07.9 CHEST PAIN: ICD-10-CM

## 2018-09-16 LAB
ANION GAP SERPL CALCULATED.3IONS-SCNC: 11 MMOL/L (ref 4–13)
ATRIAL RATE: 83 BPM
BUN SERPL-MCNC: 9 MG/DL (ref 5–25)
CALCIUM SERPL-MCNC: 9.4 MG/DL (ref 8.3–10.1)
CHLORIDE SERPL-SCNC: 104 MMOL/L (ref 100–108)
CO2 SERPL-SCNC: 20 MMOL/L (ref 21–32)
CREAT SERPL-MCNC: 0.59 MG/DL (ref 0.6–1.3)
DEPRECATED D DIMER PPP: <270 NG/ML (FEU) (ref 0–424)
GFR SERPL CREATININE-BSD FRML MDRD: 125 ML/MIN/1.73SQ M
GLUCOSE SERPL-MCNC: 94 MG/DL (ref 65–140)
P AXIS: 6 DEGREES
POTASSIUM SERPL-SCNC: 5.2 MMOL/L (ref 3.5–5.3)
PR INTERVAL: 118 MS
QRS AXIS: 47 DEGREES
QRSD INTERVAL: 68 MS
QT INTERVAL: 348 MS
QTC INTERVAL: 408 MS
SODIUM SERPL-SCNC: 135 MMOL/L (ref 136–145)
T WAVE AXIS: 27 DEGREES
VENTRICULAR RATE: 83 BPM

## 2018-09-16 PROCEDURE — 85379 FIBRIN DEGRADATION QUANT: CPT | Performed by: EMERGENCY MEDICINE

## 2018-09-16 PROCEDURE — 93010 ELECTROCARDIOGRAM REPORT: CPT | Performed by: INTERNAL MEDICINE

## 2018-09-16 PROCEDURE — 36415 COLL VENOUS BLD VENIPUNCTURE: CPT | Performed by: EMERGENCY MEDICINE

## 2018-09-16 PROCEDURE — 94644 CONT INHLJ TX 1ST HOUR: CPT

## 2018-09-16 PROCEDURE — 94760 N-INVAS EAR/PLS OXIMETRY 1: CPT

## 2018-09-16 PROCEDURE — 71046 X-RAY EXAM CHEST 2 VIEWS: CPT

## 2018-09-16 PROCEDURE — 80048 BASIC METABOLIC PNL TOTAL CA: CPT | Performed by: EMERGENCY MEDICINE

## 2018-09-16 PROCEDURE — 99285 EMERGENCY DEPT VISIT HI MDM: CPT

## 2018-09-16 PROCEDURE — 93005 ELECTROCARDIOGRAM TRACING: CPT

## 2018-09-16 RX ORDER — PREDNISONE 20 MG/1
40 TABLET ORAL ONCE
Status: COMPLETED | OUTPATIENT
Start: 2018-09-16 | End: 2018-09-16

## 2018-09-16 RX ORDER — PREDNISONE 20 MG/1
40 TABLET ORAL DAILY
Qty: 8 TABLET | Refills: 0 | Status: SHIPPED | OUTPATIENT
Start: 2018-09-16 | End: 2019-02-01

## 2018-09-16 RX ORDER — MONTELUKAST SODIUM 10 MG/1
10 TABLET ORAL
Qty: 30 TABLET | Refills: 0 | Status: SHIPPED | OUTPATIENT
Start: 2018-09-16 | End: 2019-12-18

## 2018-09-16 RX ORDER — SODIUM CHLORIDE FOR INHALATION 0.9 %
3 VIAL, NEBULIZER (ML) INHALATION ONCE
Status: COMPLETED | OUTPATIENT
Start: 2018-09-16 | End: 2018-09-16

## 2018-09-16 RX ADMIN — IPRATROPIUM BROMIDE 1 MG: 0.5 SOLUTION RESPIRATORY (INHALATION) at 20:43

## 2018-09-16 RX ADMIN — PREDNISONE 40 MG: 20 TABLET ORAL at 20:35

## 2018-09-16 RX ADMIN — ISODIUM CHLORIDE 3 ML: 0.03 SOLUTION RESPIRATORY (INHALATION) at 20:43

## 2018-09-16 RX ADMIN — ALBUTEROL SULFATE 10 MG: 2.5 SOLUTION RESPIRATORY (INHALATION) at 20:43

## 2018-09-17 NOTE — DISCHARGE INSTRUCTIONS
Asthma   WHAT YOU NEED TO KNOW:   Asthma is a lung disease that makes breathing difficult  Chronic inflammation and reactions to triggers narrow the airways in the lungs  Asthma can become life-threatening if it is not managed  DISCHARGE INSTRUCTIONS:   Return to the emergency department if:   · You have severe shortness of breath  · Your lips or nails turn blue or gray  · The skin around your neck and ribs pulls in with each breath  · You have shortness of breath, even after you take your short-term medicine as directed  · Your peak flow numbers are in the red zone of your AAP  Contact your healthcare provider if:   · You run out of medicine before your next refill is due  · Your symptoms get worse  · You need to take more medicine than usual to control your symptoms  · You have questions or concerns about your condition or care  Medicines:   · Medicines  decrease inflammation, open airways, and make it easier to breathe  Medicines may be inhaled, taken as a pill, or injected  Short-term medicines relieve your symptoms quickly  Long-term medicines are used to prevent future attacks  You may also need medicine to help control your allergies  Ask your healthcare provider for more information about the medicine you are given and how to take it safely  · Take your medicine as directed  Contact your healthcare provider if you think your medicine is not helping or if you have side effects  Tell him of her if you are allergic to any medicine  Keep a list of the medicines, vitamins, and herbs you take  Include the amounts, and when and why you take them  Bring the list or the pill bottles to follow-up visits  Carry your medicine list with you in case of an emergency  Follow up with your healthcare provider as directed: You will need to return to make sure your medicine is working and your symptoms are controlled   You may be referred to an asthma specialist  Chrissy Murry may be asked to keep a record of your peak flow values and bring it with you to your appointments  Write down your questions so you remember to ask them during your visits  Manage your symptoms and prevent future attacks:   · Follow your Asthma Action Plan (AAP)  This is a written plan that you and your healthcare provider create  It explains which medicine you need and when to change doses if necessary  It also explains how you can monitor symptoms and use a peak flow meter  The meter measures how well your lungs are working  · Manage other health conditions , such as allergies, acid reflux, and sleep apnea  · Identify and avoid triggers  These may include pets, dust mites, mold, and cockroaches  · Do not smoke or be around others who smoke  Nicotine and other chemicals in cigarettes and cigars can cause lung damage  Ask your healthcare provider for information if you currently smoke and need help to quit  E-cigarettes or smokeless tobacco still contain nicotine  Talk to your healthcare provider before you use these products  · Ask about the flu vaccine  The flu can make your asthma worse  You may need a yearly flu shot  © 2017 [facility] Information is for End User's use only and may not be sold, redistributed or otherwise used for commercial purposes  All illustrations and images included in CareNotes® are the copyrighted property of A D [employer] M [employer] Inc  or Wolfgang Tracy  The above information is an  only  It is not intended as medical advice for individual conditions or treatments  Talk to your doctor, nurse or pharmacist before following any medical regimen to see if it is safe and effective for you

## 2018-09-17 NOTE — ED PROVIDER NOTES
History  Chief Complaint   Patient presents with    Chest Pain     Pt c/o a couple of days waking up with chest pain under her left breast and up near her clavicle and feeling SOB, pt states she has asthma and used her nebulizer which made her feel slightly better      HPI   20-year-old female with history of asthma, current cigarette smoker presents to the emergency department with complaints of shortness of breath and chest pain  Patient states that over the past few days she has had increasing shortness of breath and wheezing  She reports having had associated nonspecific left-sided chest pain that is unchanged from pain she has had in the past with asthma exacerbations  She used her nebulizer 3 times this morning then used her rescue inhaler multiple times this afternoon, but she has not had any relief in her symptoms  On ROS, she complains of cough, but denies recent fevers, chills, abdominal pain, nausea, vomiting, abdominal pain, lower extremity swelling/pain, or complaints other than stated above  Patient denies any known history of or risk factors for VTE  Prior to Admission Medications   Prescriptions Last Dose Informant Patient Reported? Taking?    albuterol (2 5 mg/3 mL) 0 083 % nebulizer solution   No No   Sig: Take 3 mL by nebulization every 6 (six) hours as needed for wheezing   albuterol (PROVENTIL HFA,VENTOLIN HFA) 90 mcg/act inhaler   No No   Sig: Inhale 2 puffs every 4 (four) hours as needed for wheezing   cyclobenzaprine (FLEXERIL) 5 mg tablet   No No   Sig: Take 1 tablet (5 mg total) by mouth 3 (three) times a day as needed for muscle spasms   ibuprofen (MOTRIN) 600 mg tablet   No No   Sig: Take 1 tablet (600 mg total) by mouth every 8 (eight) hours as needed for mild pain      Facility-Administered Medications: None       Past Medical History:   Diagnosis Date    Asthma        Past Surgical History:   Procedure Laterality Date    ADENOIDECTOMY       SECTION      TONSILLECTOMY         History reviewed  No pertinent family history  I have reviewed and agree with the history as documented  Social History   Substance Use Topics    Smoking status: Former Smoker     Packs/day: 0 25    Smokeless tobacco: Never Used    Alcohol use No      Comment: occ        Review of Systems   Constitutional: Negative for chills and fever  Respiratory: Positive for cough, shortness of breath and wheezing  Cardiovascular: Positive for chest pain  Negative for leg swelling  Gastrointestinal: Negative for abdominal pain, nausea and vomiting  Musculoskeletal: Negative for arthralgias and joint swelling  Skin: Negative for rash and wound  Allergic/Immunologic: Negative for immunocompromised state  Neurological: Negative for headaches  Psychiatric/Behavioral: The patient is not nervous/anxious  All other systems reviewed and are negative  Physical Exam  Physical Exam   Constitutional: She is oriented to person, place, and time  She appears well-nourished  No distress  HENT:   Head: Normocephalic and atraumatic  Eyes: EOM are normal    Neck: Normal range of motion  Neck supple  Cardiovascular: Regular rhythm  Tachycardia present  Pulmonary/Chest: Effort normal  No respiratory distress  She has wheezes  Abdominal: Soft  She exhibits no distension  There is no tenderness  Musculoskeletal: Normal range of motion  Neurological: She is alert and oriented to person, place, and time  Skin: Skin is warm and dry  She is not diaphoretic  Psychiatric: She has a normal mood and affect  Her behavior is normal    Nursing note and vitals reviewed        Vital Signs  ED Triage Vitals [09/16/18 1901]   Temperature Pulse Respirations Blood Pressure SpO2   98 2 °F (36 8 °C) (!) 108 20 132/79 99 %      Temp Source Heart Rate Source Patient Position - Orthostatic VS BP Location FiO2 (%)   Oral Monitor Sitting Right arm --      Pain Score       5           Vitals:    09/16/18 1901   BP: 132/79   Pulse: (!) 108   Patient Position - Orthostatic VS: Sitting       Visual Acuity      ED Medications  Medications   albuterol inhalation solution 10 mg (10 mg Nebulization Given 9/16/18 2043)     And   ipratropium (ATROVENT) 0 02 % inhalation solution 1 mg (1 mg Nebulization Given 9/16/18 2043)     And   sodium chloride 0 9 % inhalation solution 3 mL (3 mL Nebulization Given 9/16/18 2043)   predniSONE tablet 40 mg (40 mg Oral Given 9/16/18 2035)       Diagnostic Studies  Results Reviewed     Procedure Component Value Units Date/Time    D-dimer, quantitative [[id number]]  (Normal) Collected:  09/16/18 2144    Lab Status:  Final result Specimen:  Blood from Arm, Right Updated:  09/16/18 2206     D-Dimer, Quant <270 ng/ml (FEU)     Basic metabolic panel [[id number]]  (Abnormal) Collected:  09/16/18 2045    Lab Status:  Final result Specimen:  Blood from Arm, Left Updated:  09/16/18 2116     Sodium 135 (L) mmol/L      Potassium 5 2 mmol/L      Chloride 104 mmol/L      CO2 20 (L) mmol/L      ANION GAP 11 mmol/L      BUN 9 mg/dL      Creatinine 0 59 (L) mg/dL      Glucose 94 mg/dL      Calcium 9 4 mg/dL      eGFR 125 ml/min/1 73sq m     Narrative:         National Kidney Disease Education Program recommendations are as follows:  GFR calculation is accurate only with a steady state creatinine  Chronic Kidney disease less than 60 ml/min/1 73 sq  meters  Kidney failure less than 15 ml/min/1 73 sq  meters  XR chest 2 views   ED Interpretation by Arthur Rodgers MD (09/16 2226)   No active cardiopulmonary disease  Final Result by Jose Juan Patten MD (09/17 9899)      No acute cardiopulmonary disease              Workstation performed: [id number]                    Procedures  Procedures  EKG: NSR @ 83 BPM, normal EKG     Phone Contacts  ED Phone Contact    ED Course                               MDM  Number of Diagnoses or Management Options  Asthma exacerbation:   Chest pain:   Shortness of breath:   Diagnosis management comments: 77-year-old female with SOB, wheezing, CP consistent with prior asthma exacerbations  Will give phan neb, obtain CXR, EKG, d dimer (unable to [facility] out)  Dispo pending  Symptoms significantly improved following neb  Will discharge home with steroids, refilled singular  CritCare Time    Disposition  Final diagnoses:   Asthma exacerbation   Shortness of breath   Chest pain     Time reflects when diagnosis was documented in both MDM as applicable and the Disposition within this note     Time User Action Codes Description Comment    9/16/2018 10:27 PM Anny Ledesma Add [J45 901] Asthma exacerbation     9/16/2018 10:27 PM Seema Shabazz Add [R06 02] Shortness of breath     9/16/2018 10:27 PM Seema Shabazz Add [R07 9] Chest pain       ED Disposition     ED Disposition Condition Comment    Discharge  [facility] discharge to home/self care      Condition at discharge: Good        Follow-up Information     Follow up With Specialties Details Why Contact Info Additional Information    Cris Obrien MD Family Medicine  As needed [address] [facility] [address]  [address] [phone] [facility] Emergency Department Emergency Medicine  If symptoms worsen [address] [facility] [address]  [phone] [place] ED, [address], [place], [address]          Discharge Medication List as of 9/16/2018 10:42 PM      START taking these medications    Details   montelukast (SINGULAIR) 10 mg tablet Take 1 tablet (10 mg total) by mouth daily at bedtime, Starting Sun 9/16/2018, Print      predniSONE 20 mg tablet Take 2 tablets (40 mg total) by mouth daily, Starting Sun 9/16/2018, Normal         CONTINUE these medications which have NOT CHANGED    Details   albuterol (2 5 mg/3 mL) 0 083 % nebulizer solution Take 3 mL by nebulization every 6 (six) hours as needed for wheezing, Starting Tue 1/9/2018, Print albuterol (PROVENTIL HFA,VENTOLIN HFA) 90 mcg/act inhaler Inhale 2 puffs every 4 (four) hours as needed for wheezing, Starting Tue 1/9/2018, Print      cyclobenzaprine (FLEXERIL) 5 mg tablet Take 1 tablet (5 mg total) by mouth 3 (three) times a day as needed for muscle spasms, Starting Mon 2/12/2018, Print      ibuprofen (MOTRIN) 600 mg tablet Take 1 tablet (600 mg total) by mouth every 8 (eight) hours as needed for mild pain, Starting Tue 4/17/2018, Normal           No discharge procedures on file      ED Provider  Electronically Signed by           Fransisco Boyd MD  09/22/18 3499

## 2019-01-27 ENCOUNTER — HOSPITAL ENCOUNTER (EMERGENCY)
Facility: HOSPITAL | Age: 29
Discharge: HOME | End: 2019-01-28
Attending: EMERGENCY MEDICINE | Admitting: EMERGENCY MEDICINE
Payer: COMMERCIAL

## 2019-01-27 ENCOUNTER — APPOINTMENT (EMERGENCY)
Dept: ULTRASOUND IMAGING | Facility: HOSPITAL | Age: 29
End: 2019-01-27
Payer: COMMERCIAL

## 2019-01-27 VITALS
WEIGHT: 195.33 LBS | RESPIRATION RATE: 18 BRPM | TEMPERATURE: 98.2 F | BODY MASS INDEX: 35.73 KG/M2 | SYSTOLIC BLOOD PRESSURE: 128 MMHG | OXYGEN SATURATION: 98 % | DIASTOLIC BLOOD PRESSURE: 87 MMHG | HEART RATE: 87 BPM

## 2019-01-27 DIAGNOSIS — O20.0 THREATENED MISCARRIAGE IN EARLY PREGNANCY: Primary | ICD-10-CM

## 2019-01-27 LAB
ABO GROUP BLD: NORMAL
B-HCG SERPL-ACNC: ABNORMAL MIU/ML
BACTERIA UR QL AUTO: ABNORMAL /HPF
BILIRUB UR QL STRIP: NEGATIVE
BLD GP AB SCN SERPL QL: NEGATIVE
CLARITY UR: CLEAR
COLOR UR: ABNORMAL
GLUCOSE UR STRIP-MCNC: NEGATIVE MG/DL
HGB UR QL STRIP.AUTO: ABNORMAL
KETONES UR STRIP-MCNC: NEGATIVE MG/DL
LEUKOCYTE ESTERASE UR QL STRIP: NEGATIVE
MUCOUS THREADS UR QL AUTO: ABNORMAL
NITRITE UR QL STRIP: NEGATIVE
NON-SQ EPI CELLS URNS QL MICRO: ABNORMAL /HPF
PH UR STRIP.AUTO: 6 [PH] (ref 4.5–8)
PROT UR STRIP-MCNC: NEGATIVE MG/DL
RBC #/AREA URNS AUTO: ABNORMAL /HPF
RH BLD: NEGATIVE
SP GR UR STRIP.AUTO: <=1.005 (ref 1–1.03)
SPECIMEN EXPIRATION DATE: NORMAL
UROBILINOGEN UR QL STRIP.AUTO: 0.2 E.U./DL
WBC #/AREA URNS AUTO: ABNORMAL /HPF

## 2019-01-27 PROCEDURE — 81001 URINALYSIS AUTO W/SCOPE: CPT | Performed by: EMERGENCY MEDICINE

## 2019-01-27 PROCEDURE — 84702 CHORIONIC GONADOTROPIN TEST: CPT | Performed by: EMERGENCY MEDICINE

## 2019-01-27 PROCEDURE — 86901 BLOOD TYPING SEROLOGIC RH(D): CPT | Performed by: EMERGENCY MEDICINE

## 2019-01-27 PROCEDURE — 76801 OB US < 14 WKS SINGLE FETUS: CPT

## 2019-01-27 PROCEDURE — 87086 URINE CULTURE/COLONY COUNT: CPT | Performed by: EMERGENCY MEDICINE

## 2019-01-27 PROCEDURE — 86850 RBC ANTIBODY SCREEN: CPT | Performed by: EMERGENCY MEDICINE

## 2019-01-27 PROCEDURE — 99284 EMERGENCY DEPT VISIT MOD MDM: CPT

## 2019-01-27 PROCEDURE — 36415 COLL VENOUS BLD VENIPUNCTURE: CPT | Performed by: EMERGENCY MEDICINE

## 2019-01-27 PROCEDURE — 86900 BLOOD TYPING SEROLOGIC ABO: CPT | Performed by: EMERGENCY MEDICINE

## 2019-01-28 PROCEDURE — 96372 THER/PROPH/DIAG INJ SC/IM: CPT

## 2019-01-28 RX ADMIN — HUMAN RHO(D) IMMUNE GLOBULIN 300 MCG: 300 INJECTION, SOLUTION INTRAMUSCULAR at 00:01

## 2019-01-28 NOTE — ED PROVIDER NOTES
History  Chief Complaint   Patient presents with    Vaginal Bleeding - Pregnant     Onset 1 hour ago-pt states heavy vaginal bleeding and cramping with some small clots  HPI  27-year-old [id number], LMP 11/27/18 presents to the ED with vaginal bleeding and abdominal cramping  Patient stated that she has had lower abdominal cramping intermittently since yesterday with acute onset vaginal bleeding tonight while using the bathroom  She reports persistent bleeding since that time, now about as heavy as a period and with small clots  She denies any known modifying factors for her symptoms  She reports having had very light bleeding in the past during pregnancy, but not having had similar heavy bleeding  She has not yet had IUP confirmed, as she has not seen an OBGYN since she found out she was pregnant  On ROS, she denies any complaints other than stated above  Known blood type O-  Prior to Admission Medications   Prescriptions Last Dose Informant Patient Reported? Taking?    albuterol (2 5 mg/3 mL) 0 083 % nebulizer solution 1/27/2019 at Unknown time  No Yes   Sig: Take 3 mL by nebulization every 6 (six) hours as needed for wheezing   albuterol (PROVENTIL HFA,VENTOLIN HFA) 90 mcg/act inhaler 1/27/2019 at Unknown time  No Yes   Sig: Inhale 2 puffs every 4 (four) hours as needed for wheezing   cyclobenzaprine (FLEXERIL) 5 mg tablet 1/27/2019 at Unknown time  No Yes   Sig: Take 1 tablet (5 mg total) by mouth 3 (three) times a day as needed for muscle spasms   ibuprofen (MOTRIN) 600 mg tablet 1/27/2019 at Unknown time  No Yes   Sig: Take 1 tablet (600 mg total) by mouth every 8 (eight) hours as needed for mild pain   montelukast (SINGULAIR) 10 mg tablet 1/27/2019 at Unknown time  No Yes   Sig: Take 1 tablet (10 mg total) by mouth daily at bedtime   predniSONE 20 mg tablet 1/27/2019 at Unknown time  No Yes   Sig: Take 2 tablets (40 mg total) by mouth daily      Facility-Administered Medications: None       Past Medical History:   Diagnosis Date    Asthma        Past Surgical History:   Procedure Laterality Date    ADENOIDECTOMY       SECTION      TONSILLECTOMY         History reviewed  No pertinent family history  I have reviewed and agree with the history as documented  Social History   Substance Use Topics    Smoking status: Current Every Day Smoker     Packs/day: 0 25     Types: Cigarettes    Smokeless tobacco: Never Used    Alcohol use No      Comment: occ        Review of Systems   Constitutional: Negative for chills and fever  Respiratory: Negative for shortness of breath  Gastrointestinal: Positive for abdominal pain  Negative for nausea and vomiting  Genitourinary: Positive for vaginal bleeding  Negative for dysuria  Skin: Negative for rash and wound  Allergic/Immunologic: Negative for immunocompromised state  Neurological: Negative for headaches  Hematological: Does not bruise/bleed easily  Psychiatric/Behavioral: The patient is not nervous/anxious  All other systems reviewed and are negative  Physical Exam  Physical Exam   Constitutional: She is oriented to person, place, and time  She appears well-nourished  No distress  HENT:   Head: Normocephalic and atraumatic  Eyes: EOM are normal    Neck: Normal range of motion  Neck supple  Cardiovascular: Normal rate and regular rhythm  Pulmonary/Chest: Effort normal and breath sounds normal  No respiratory distress  Abdominal: Soft  She exhibits no distension  There is no tenderness  Musculoskeletal: Normal range of motion  Neurological: She is alert and oriented to person, place, and time  Skin: Skin is warm and dry  She is not diaphoretic  Psychiatric: She has a normal mood and affect  Her behavior is normal    Nursing note and vitals reviewed  Not able to visualize FHR       Vital Signs  ED Triage Vitals [19]   Temperature Pulse Respirations Blood Pressure SpO2   98 2 °F (36 8 °C) (!) 110 18 (!) 150/101 99 %      Temp Source Heart Rate Source Patient Position - Orthostatic VS BP Location FiO2 (%)   Oral Monitor Sitting Left arm --      Pain Score       6           Vitals:    01/27/19 2037 01/27/19 2349   BP: (!) 150/101 128/87   Pulse: (!) 110 87   Patient Position - Orthostatic VS: Sitting Sitting       Visual Acuity      ED Medications  Medications   Rho(D) immune globulin (RHOGAM ULTRA-FILTERED PLUS) IM injection 300 mcg (300 mcg Intramuscular Given 1/28/19 0001)       Diagnostic Studies  Results Reviewed     Procedure Component Value Units Date/Time    Urine culture [[id number]] Collected:  01/27/19 2147    Lab Status:  Final result Specimen:  Urine from Urine, Clean Catch Updated:  01/29/19 0712     Urine Culture <10,000 cfu/ml     hCG, quantitative [[id number]]  (Abnormal) Collected:  01/27/19 2147    Lab Status:  Final result Specimen:  Blood from Arm, Right Updated:  01/27/19 2249     HCG, Quant 24,716 (H) mIU/mL     Narrative:          Expected Ranges:     Approximate               Approximate HCG  Gestation age          Concentration ( mIU/mL)  _____________          ______________________   Manuel Nurse                      HCG values  0 2-1                       5-50  1-2                           2-3                         100-5000  3-4                         500-98586  4-5                         1000-34735  5-6                         [id number]  6-8                         85781-701709  8-12                        09314-516960    Urine Microscopic [[id number]]  (Abnormal) Collected:  01/27/19 2147    Lab Status:  Final result Specimen:  Urine from Urine, Clean Catch Updated:  01/27/19 2212     RBC, UA 2-4 (A) /hpf      WBC, UA 0-1 (A) /hpf      Epithelial Cells Occasional /hpf      Bacteria, UA Occasional /hpf      MUCUS THREADS Occasional (A)     URINE COMMENT --    UA w Reflex to Microscopic w Reflex to Culture [[id number]]  (Abnormal) Collected:  01/27/19 2147    Lab Status:  Final result Specimen:  Urine from Urine, Clean Catch Updated:  01/27/19 2208     Color, UA Light Yellow     Clarity, UA Clear     Specific Gravity, UA <=1 005     pH, UA 6 0     Leukocytes, UA Negative     Nitrite, UA Negative     Protein, UA Negative mg/dl      Glucose, UA Negative mg/dl      Ketones, UA Negative mg/dl      Urobilinogen, UA 0 2 E U /dl      Bilirubin, UA Negative     Blood, UA Large (A)     URINE COMMENT --                 US OB < 14 weeks with transvaginal   Final Result by Prasad Peng DO (01/27 2302)   Single living intrauterine gestation  Estimated gestational age by crown-rump length is 6 weeks and 6 days which roughly correlates with the estimated gestational age by dates  Uterine fibroids as described  Left ovarian corpus luteum; bilateral ovaries otherwise appear unremarkable  Other findings as above  Clinical follow-up recommended  Additional imaging may be considered as clinically warranted or at the discretion of the referring caregiver  Expected date of confinement by ultrasound 9/17/2018      Workstation performed: [id number]                    Procedures  Procedures       Phone Contacts  ED Phone Contact    ED Course  ED Course as of Jan 30 0048   Yaima Bond Jan 27, 2019 2124 IUP confirmed    2251 HCG QUANTITATIVE: [place] [address] 83,232   2332 Pending RhoGAM                MDM  CritCare Time    Disposition  Final diagnoses:   Threatened miscarriage in early pregnancy     Time reflects when diagnosis was documented in both MDM as applicable and the Disposition within this note     Time User Action Codes Description Comment    1/27/2019 11:38 PM Rajat Perez Add [O20 0] Threatened miscarriage in early pregnancy       ED Disposition     ED Disposition Condition Date/Time Comment    Discharge  Sun Jan 27, 2019 11:38 PM Ger Spicer discharge to home/self care      Condition at discharge: Good        Follow-up Information     Follow up With Specialties Details Why Contact Info Additional Information    [facility] Obstetrics and Gynecology   [address]  [facility] [place] [address] [phone] [address] [facility],  [address] [address], [facility] [place], [place], [address] [address]  Schedule an appointment as soon as possible for a visit  [address] [facility] [address] [phone]           Discharge Medication List as of 1/27/2019 11:45 PM      CONTINUE these medications which have NOT CHANGED    Details   albuterol (2 5 mg/3 mL) 0 083 % nebulizer solution Take 3 mL by nebulization every 6 (six) hours as needed for wheezing, Starting Tue 1/9/2018, Print      albuterol (PROVENTIL HFA,VENTOLIN HFA) 90 mcg/act inhaler Inhale 2 puffs every 4 (four) hours as needed for wheezing, Starting Tue 1/9/2018, Print      cyclobenzaprine (FLEXERIL) 5 mg tablet Take 1 tablet (5 mg total) by mouth 3 (three) times a day as needed for muscle spasms, Starting Mon 2/12/2018, Print      ibuprofen (MOTRIN) 600 mg tablet Take 1 tablet (600 mg total) by mouth every 8 (eight) hours as needed for mild pain, Starting Tue 4/17/2018, Normal      montelukast (SINGULAIR) 10 mg tablet Take 1 tablet (10 mg total) by mouth daily at bedtime, Starting Sun 9/16/2018, Print      predniSONE 20 mg tablet Take 2 tablets (40 mg total) by mouth daily, Starting Sun 9/16/2018, Normal             Outpatient Discharge Orders  hCG, quantitative   Standing Status: Future  Standing Exp   Date: 01/27/20         ED Provider  Electronically Signed by           Long Iglesias MD  01/30/19 9302

## 2019-01-28 NOTE — DISCHARGE INSTRUCTIONS
Threatened Miscarriage   WHAT YOU NEED TO KNOW:   A threatened miscarriage occurs when you have vaginal bleeding within the first 20 weeks of pregnancy  It means that a miscarriage may happen  A threatened miscarriage may also be called a threatened   DISCHARGE INSTRUCTIONS:   Return to the emergency department if:   · You feel weak or faint  · Your pain or cramping in your abdomen or back gets worse  · You have vaginal bleeding that soaks 1 or more pads in an hour  · You pass material that looks like tissue or large clots  Contact your healthcare provider or obstetrician if:   · You have a fever  · You have trouble urinating, burning when you urinate, or feel a need to urinate often  · You have new or worsening vaginal bleeding  · You have vaginal pain or itching, or vaginal discharge that is yellow, green, or foul-smelling  · You have questions or concerns about your condition or care  Self-care: The following may help you manage your symptoms and decrease your risk for a miscarriage:  · Do not put anything in your vagina  Do not have sex, douche, or use tampons  These actions may increase your risk for infection and miscarriage  · Rest as directed  Do not exercise or do strenuous activities  These activities may cause  labor or miscarriage  Ask your healthcare provider what activities are okay to do  Stay healthy during pregnancy:   · Eat a variety of healthy foods  Healthy foods can help you get extra protein, water, and calories that you need while you are pregnant  Healthy foods include fruits, vegetables, whole-grain breads, low-fat dairy products, beans, lean meats, and fish  Avoid raw or undercooked meat and fish  Ask your healthcare provider if you need a special diet  · Take prenatal vitamins as directed  These help you get the right amount of vitamins and minerals  They may also decrease the risk of certain birth defects      · Do not drink alcohol or use illegal drugs  These can increase your risk for a miscarriage or harm your baby  · Do not smoke  Nicotine and other chemicals in cigarettes and cigars can harm your baby and cause miscarriage or  labor  Ask your healthcare provider for information if you currently smoke and need help to quit  E-cigarettes or smokeless tobacco still contain nicotine  Do not use these products  · Decrease your risk for an infection  Always wash your hands before eating or preparing meals  Do not spend time with people who are sick  Ask your healthcare provider if you need immunizations such as the flu or hepatitis B vaccine  Immunizations may decrease your risk for infections that could cause a miscarriage  · Manage your medical conditions  Keep your blood pressure and blood sugars under control  Maintain a healthy weight during pregnancy  Follow up with your obstetrician as directed: You may need to see your obstetrician frequently for ultrasounds or blood tests  Write down your questions so you remember to ask them during your visits  ©  2600 [employer] Information is for End User's use only and may not be sold, redistributed or otherwise used for commercial purposes  All illustrations and images included in CareNotes® are the copyrighted property of [employer]  or Wolfgang Tracy  The above information is an  only  It is not intended as medical advice for individual conditions or treatments  Talk to your doctor, nurse or pharmacist before following any medical regimen to see if it is safe and effective for you

## 2019-01-29 LAB — BACTERIA UR CULT: NORMAL

## 2019-01-31 ENCOUNTER — APPOINTMENT (OUTPATIENT)
Dept: LAB | Facility: HOSPITAL | Age: 29
End: 2019-01-31
Attending: EMERGENCY MEDICINE
Payer: COMMERCIAL

## 2019-01-31 DIAGNOSIS — O20.0 THREATENED MISCARRIAGE IN EARLY PREGNANCY: ICD-10-CM

## 2019-01-31 LAB — B-HCG SERPL-ACNC: ABNORMAL MIU/ML

## 2019-01-31 PROCEDURE — 84702 CHORIONIC GONADOTROPIN TEST: CPT

## 2019-01-31 PROCEDURE — 36415 COLL VENOUS BLD VENIPUNCTURE: CPT

## 2019-02-01 ENCOUNTER — OFFICE VISIT (OUTPATIENT)
Dept: OBGYN CLINIC | Facility: CLINIC | Age: 29
End: 2019-02-01

## 2019-02-01 VITALS
WEIGHT: 183 LBS | BODY MASS INDEX: 33.68 KG/M2 | HEIGHT: 62 IN | HEART RATE: 78 BPM | SYSTOLIC BLOOD PRESSURE: 136 MMHG | DIASTOLIC BLOOD PRESSURE: 65 MMHG

## 2019-02-01 DIAGNOSIS — Z34.91 PRENATAL CARE IN FIRST TRIMESTER: Primary | ICD-10-CM

## 2019-02-01 PROCEDURE — 99203 OFFICE O/P NEW LOW 30 MIN: CPT | Performed by: OBSTETRICS & GYNECOLOGY

## 2019-02-01 NOTE — PROGRESS NOTES
S: 28 y/o [id number] presents today for a viability scan  She does not recall exact date of LMP  Pt states 6 days ago she experienced 2 hours of spotting with passage of small size clots  That day pt was seen in the ED and had a positive quantitative HCG  Since that day she has not had any more bleeding  Pt also had some cramping at that time, but since then had only minimal occasional cramping  Denies fluid loss or contractions        O:     Vitals:    02/01/19 1024   BP: 136/65   Pulse: 78      TVUS:  IUP  Gestational sac pregnancy visible consistent with   7w4d  CRL 1 32cm   bpm       A/P  #1 IUP at 7w4d  - Follow up appointment for prenatal intake in 1 week   - Continue PNV     Zee Song DO

## 2019-02-01 NOTE — LETTER
February 1, 2019     Patient: Camille Rush   YOB: 1990   Date of Visit: 2/1/2019       To Whom it May Concern:    Camille Rush is under my professional care  She was seen in my office on 2/1/2019  She may return to work on 02/02/2019  If you have any questions or concerns, please don't hesitate to call           Sincerely,          Jen Briscoe MD        CC: Camille Rush

## 2019-02-13 ENCOUNTER — INITIAL PRENATAL (OUTPATIENT)
Dept: OBGYN CLINIC | Facility: CLINIC | Age: 29
End: 2019-02-13

## 2019-02-13 ENCOUNTER — APPOINTMENT (OUTPATIENT)
Dept: LAB | Facility: HOSPITAL | Age: 29
End: 2019-02-13
Payer: COMMERCIAL

## 2019-02-13 VITALS
SYSTOLIC BLOOD PRESSURE: 126 MMHG | BODY MASS INDEX: 33.27 KG/M2 | WEIGHT: 180.8 LBS | HEART RATE: 75 BPM | DIASTOLIC BLOOD PRESSURE: 76 MMHG | HEIGHT: 62 IN

## 2019-02-13 DIAGNOSIS — Z34.91 PREGNANT AND NOT YET DELIVERED IN FIRST TRIMESTER: ICD-10-CM

## 2019-02-13 DIAGNOSIS — Z86.14 HISTORY OF MRSA INFECTION: Primary | ICD-10-CM

## 2019-02-13 DIAGNOSIS — Z3A.08 8 WEEKS GESTATION OF PREGNANCY: ICD-10-CM

## 2019-02-13 DIAGNOSIS — Z86.14 HISTORY OF MRSA INFECTION: ICD-10-CM

## 2019-02-13 LAB
ABO GROUP BLD: NORMAL
BACTERIA UR QL AUTO: NORMAL /HPF
BASOPHILS # BLD AUTO: 0.03 THOUSANDS/ΜL (ref 0–0.1)
BASOPHILS NFR BLD AUTO: 0 % (ref 0–1)
BILIRUB UR QL STRIP: NEGATIVE
BLD GP AB SCN SERPL QL: POSITIVE
CLARITY UR: CLEAR
COLOR UR: YELLOW
EOSINOPHIL # BLD AUTO: 0.09 THOUSAND/ΜL (ref 0–0.61)
EOSINOPHIL NFR BLD AUTO: 1 % (ref 0–6)
ERYTHROCYTE [DISTWIDTH] IN BLOOD BY AUTOMATED COUNT: 13 % (ref 11.6–15.1)
GLUCOSE UR STRIP-MCNC: NEGATIVE MG/DL
HBV SURFACE AG SER QL: NORMAL
HCT VFR BLD AUTO: 40.1 % (ref 34.8–46.1)
HGB BLD-MCNC: 13.1 G/DL (ref 11.5–15.4)
HGB UR QL STRIP.AUTO: NEGATIVE
HYALINE CASTS #/AREA URNS LPF: NORMAL /LPF
IMM GRANULOCYTES # BLD AUTO: 0.04 THOUSAND/UL (ref 0–0.2)
IMM GRANULOCYTES NFR BLD AUTO: 0 % (ref 0–2)
KETONES UR STRIP-MCNC: NEGATIVE MG/DL
LEUKOCYTE ESTERASE UR QL STRIP: ABNORMAL
LYMPHOCYTES # BLD AUTO: 1.65 THOUSANDS/ΜL (ref 0.6–4.47)
LYMPHOCYTES NFR BLD AUTO: 17 % (ref 14–44)
MCH RBC QN AUTO: 29.6 PG (ref 26.8–34.3)
MCHC RBC AUTO-ENTMCNC: 32.7 G/DL (ref 31.4–37.4)
MCV RBC AUTO: 91 FL (ref 82–98)
MONOCYTES # BLD AUTO: 0.52 THOUSAND/ΜL (ref 0.17–1.22)
MONOCYTES NFR BLD AUTO: 5 % (ref 4–12)
NEUTROPHILS # BLD AUTO: 7.41 THOUSANDS/ΜL (ref 1.85–7.62)
NEUTS SEG NFR BLD AUTO: 77 % (ref 43–75)
NITRITE UR QL STRIP: NEGATIVE
NON-SQ EPI CELLS URNS QL MICRO: NORMAL /HPF
NRBC BLD AUTO-RTO: 0 /100 WBCS
PH UR STRIP.AUTO: 7.5 [PH] (ref 4.5–8)
PLATELET # BLD AUTO: 260 THOUSANDS/UL (ref 149–390)
PMV BLD AUTO: 10.3 FL (ref 8.9–12.7)
PROT UR STRIP-MCNC: NEGATIVE MG/DL
RBC # BLD AUTO: 4.43 MILLION/UL (ref 3.81–5.12)
RBC #/AREA URNS AUTO: NORMAL /HPF
RH BLD: NEGATIVE
RUBV IGG SERPL IA-ACNC: >175 IU/ML
SP GR UR STRIP.AUTO: 1.01 (ref 1–1.03)
SPECIMEN EXPIRATION DATE: NORMAL
UROBILINOGEN UR QL STRIP.AUTO: 0.2 E.U./DL
WBC # BLD AUTO: 9.74 THOUSAND/UL (ref 4.31–10.16)
WBC #/AREA URNS AUTO: NORMAL /HPF

## 2019-02-13 PROCEDURE — 87186 SC STD MICRODIL/AGAR DIL: CPT

## 2019-02-13 PROCEDURE — 36415 COLL VENOUS BLD VENIPUNCTURE: CPT

## 2019-02-13 PROCEDURE — 87086 URINE CULTURE/COLONY COUNT: CPT

## 2019-02-13 PROCEDURE — 87077 CULTURE AEROBIC IDENTIFY: CPT

## 2019-02-13 PROCEDURE — 86870 RBC ANTIBODY IDENTIFICATION: CPT

## 2019-02-13 PROCEDURE — 80081 OBSTETRIC PANEL INC HIV TSTG: CPT

## 2019-02-13 PROCEDURE — 99211 OFF/OP EST MAY X REQ PHY/QHP: CPT

## 2019-02-13 PROCEDURE — 81001 URINALYSIS AUTO W/SCOPE: CPT

## 2019-02-13 PROCEDURE — 87081 CULTURE SCREEN ONLY: CPT

## 2019-02-13 NOTE — PATIENT INSTRUCTIONS
Warning signs during pregnancy      When to Call    1  Vaginal bleeding  2  Sharp abdominal pain that does not go away  3  Fever (more than 100 4 and is not relieved by Tylenol)  4  Persistent vomiting lasting greater than 24 hours  5  Chest pain   6  Pain or burning when you urinate  7  Severe headache that doesn't resolve with Tylenol  8  Blurred vision or seeing spots in your vision  9  Sudden swelling of your face or hands  10  Redness, swelling or pain in a leg  11  A sudden weight gain in just a few days  12  Decrease in your baby's movement (after 28 weeks or the 6th month of pregnancy)  13  A loss of watery fluid from your vagina - can be a gush, a trickle or continuous wetness  14  After 20 weeks of pregnancy, rhythmic cramping (greater than 4 per hour) or menstrual like low/pelvic pain          Medications and Pregnancy: The following list of over-the-counter medications is usually considered safe to take during pregnancy  Take care to not double up on products containing acetaminophen (Tylenol)  Colds/Sore Throat   Robitussin DM - Plain (guaifenesin)   Saline nasal spray   Warm salt water gargle   Cepacol throat lozenges or mouthwash (cetylpyridinium)   Sucrets (hexylresoricinol)    Allergy  AVOID the D - or DECONGESTANT   Claritin (loratadine)   Zrytec (cetirizine)   Allerga (fexofenadine)   Headache/ Aches and Pains    Headaches / Aches and Pains:   Tylenol (acetaminophen)  Do NOT exceed more than 3000 mg of Tylenol in a 24-hour period      Heartburn   Mylanta (aluminum hydroxide/simethicone, magnesium hydroxide)   Maalaox (aluminum magnesium hydroxide, magnesium hydroxide)   Tums (calcium carbonate)   Riopan (magaldrate)    Constipation   Colace (docusate sodium)   Surfak (docusate sodium)   MiraLAX   Glycerin suppositories   Fleets enema (sodium phosphate & sodium biphosphate)    Nausea/Vomiting   Vitamin B6 (pyridoxine) - May take 50 mg at bedtime, 25 mg in the morning, 25 mg in the afternoon   Unisom (doxylamine) - May use for nausea/vomiting - (cut a 25 mg tablet in half)  May cause drowsiness  Sleep   Benadryl (diphenhydramine) - Take 1-2 tablets as needed at bedtime   Unisom (doxylamine) 25 mg tablet - As needed at bedtime   Melatonin 5 mg tablet - As needed at bedtime    Generally the generic form of medicine is usually lower priced than the brand name form of the medicine  Pregnancy   AMBULATORY CARE:   What you need to know about pregnancy:  A normal pregnancy lasts about 40 weeks  The first trimester lasts from your last period through the 12th week of pregnancy  The second trimester lasts from the 13th week of your pregnancy through the 23rd week  The third trimester lasts from your 24th week of pregnancy until your baby is born  If you know the date of your last period, your healthcare provider can estimate your due date  You may give birth to your baby any time from 37 weeks to 2 weeks after your due date  Seek care immediately if:   · You develop a severe headache that does not go away  · You have new or increased vision changes, such as blurred or spotted vision  · You have new or increased swelling in your face or hands  · You have pain or cramping in your abdomen or low back  · You have vaginal bleeding  Contact your healthcare provider or obstetrician if:   · You have abdominal cramps, pressure, or tightening  · You have a change in vaginal discharge  · You cannot keep food or drinks down, and you are losing weight  · You have chills or a fever  · You have vaginal itching, burning, or pain  · You have yellow, green, white, or foul-smelling vaginal discharge  · You have pain or burning when you urinate, less urine than usual, or pink or bloody urine  · You have questions or concerns about your condition or care    Body changes that may occur during your pregnancy:   · Breast changes  you will experience include tenderness and tingling during the early part of your pregnancy  Your breasts will become larger  You may need to use a support bra  You may see a thin, yellow fluid, called colostrum, leak from your nipples during the second trimester  Colostrum is a liquid that changes to milk about 3 days after you give birth  · Skin changes and stretch marks  may occur during your pregnancy  You may have red marks, called stretch marks, on your skin  Stretch marks will usually fade after pregnancy  Use lotion if your skin is dry and itchy  The skin on your face, around your nipples, and below your belly button may darken  Most of the time, your skin will return to its normal color after your baby is born  · Morning sickness  is nausea and vomiting that can happen at any time of day  Avoid fatty and spicy foods  Eat small meals throughout the day instead of large meals  Whitney may help to decrease nausea  Ask your healthcare provider about other ways of decreasing nausea and vomiting  · Heartburn  may be caused by changes in your hormones during pregnancy  Your growing uterus may also push your stomach upward and force stomach acid to back up into your esophagus  Eat 4 or 5 small meals each day instead of large meals  Avoid spicy foods  Avoid eating right before bedtime  · Constipation  may develop during your pregnancy  To treat constipation, eat foods high in fiber such as fiber cereals, beans, fruits, vegetables, whole-grain breads, and prune juice  Get regular exercise and drink plenty of water  Your healthcare provider may also suggest a fiber supplement to soften your bowel movements  Talk to your healthcare provider before you use any medicines to decrease constipation  · Hemorrhoids  are enlarged veins in the rectal area  They may cause pain, itching, and bright red bleeding from your rectum  To decrease your risk of hemorrhoids, prevent constipation and do not strain to have a bowel movement   If you have hemorrhoids, soak in a tub of warm water to ease discomfort  Ask your healthcare provider how you can treat hemorrhoids  · Leg cramps and swelling  may be caused by low calcium levels or the added weight of pregnancy  Raise your legs above the level of your heart to decrease swelling  During a leg cramp, stretch or massage the muscle that has the cramp  Heat may help decrease pain and muscle spasms  Apply heat on your muscle for 20 to 30 minutes every 2 hours for as many days as directed  · Back pain  may occur as your baby grows  Do not stand for long periods of time or lift heavy items  Use good posture while you stand, squat, or bend  Wear low-heeled shoes with good support  Rest may also help to relieve back pain  Ask your healthcare provider about exercises you can do to strengthen your back muscles  Stay healthy during your pregnancy:   · Eat a variety of healthy foods  Healthy foods include fruits, vegetables, whole-grain breads, low-fat dairy foods, beans, lean meats, and fish  Drink liquids as directed  Ask how much liquid to drink each day and which liquids are best for you  Limit caffeine to less than 200 milligrams each day  Limit your intake of fish to 2 servings each week  Choose fish low in mercury such as canned light tuna, shrimp, crab, salmon, cod, or tilapia  Do not  eat fish high in mercury such as swordfish, tilefish, marshal mackerel, and shark  · Take prenatal vitamins as directed  Your need for certain vitamins and minerals, such as folic acid, increases during pregnancy  Prenatal vitamins provide some of the extra vitamins and minerals you need  Prenatal vitamins may also help to decrease the risk of certain birth defects  · Ask how much weight you should gain during your pregnancy  Too much or too little weight gain can be unhealthy for you and your baby  · Talk to your healthcare provider about exercise  Moderate exercise can help you stay fit   Your healthcare provider will help you plan an exercise program that is safe for you during pregnancy  · Do not smoke  If you smoke, it is never too late to quit  Smoking increases your risk of a miscarriage and other health problems during your pregnancy  Smoking can cause your baby to be born too early or weigh less at birth  Ask your healthcare provider for information if you need help quitting  · Do not drink alcohol  Alcohol passes from your body to your baby through the placenta  It can affect your baby's brain development and cause fetal alcohol syndrome (FAS)  FAS is a group of conditions that causes mental, behavior, and growth problems  · Talk to your healthcare provider before you take any medicines  Many medicines may harm your baby if you take them when you are pregnant  Do not take any medicines, vitamins, herbs, or supplements without first talking to your healthcare provider  Never use illegal or street drugs (such as marijuana or cocaine) while you are pregnant  Safety tips:   · Avoid hot tubs and saunas  Do not use a hot tub or sauna while you are pregnant, especially during your first trimester  Hot tubs and saunas may raise your baby's temperature and increase the risk of birth defects  · Avoid toxoplasmosis  This is an infection caused by eating raw meat or being around infected cat feces  It can cause birth defects, miscarriages, and other problems  Wash your hands after you touch raw meat  Make sure any meat is well-cooked before you eat it  Avoid raw eggs and unpasteurized milk  Use gloves or ask someone else to clean your cat's litter box while you are pregnant  · Ask your healthcare provider about travel  The most comfortable time to travel is during the second trimester  Ask your healthcare provider if you can travel after 36 weeks  You may not be able to travel in an airplane after 36 weeks  He may also recommend that you avoid long road trips    Follow up with your healthcare provider or obstetrician as directed:  Go to all of your prenatal visits during your pregnancy  Write down your questions so you remember to ask them during your visits  © 2017 [facility] Information is for End User's use only and may not be sold, redistributed or otherwise used for commercial purposes  All illustrations and images included in CareNotes® are the copyrighted property of [employer]  or Wolfgang Tracy  The above information is an  only  It is not intended as medical advice for individual conditions or treatments  Talk to your doctor, nurse or pharmacist before following any medical regimen to see if it is safe and effective for you  Pregnancy Diet   WHAT YOU NEED TO KNOW:   What is a healthy diet during pregnancy? A healthy diet during pregnancy is a meal plan that provides the amount of calories and nutrients you need during pregnancy  Your body needs extra calories and nutrients to support your growing baby  You need to gain the right amount of weight for a healthy baby and pregnancy  Babies born at a healthy weight have a lower risk of certain health problems at birth and later in life  A healthy diet may help you avoid gaining too much weight  Too much weight gain may cause problems for you during your pregnancy and delivery  What should I avoid while I am pregnant? · Alcohol:  Do not drink alcohol during pregnancy  Alcohol can increase your risk of a miscarriage (losing your baby)  Your baby may also be born too small and have other health problems, such as learning problems later in life  · Caffeine: It is not clear how caffeine affects pregnancy  Limit your intake of caffeine to avoid possible health problems  Caffeine may be found in coffee, tea, cola, sports drinks, and chocolate  · Foods that contain mercury:  Mercury is naturally found in almost all types of fish and shellfish   Some types of fish absorb higher levels of mercury that can be harmful to an unborn baby  Eat only fish and shellfish that are low in mercury  Each week, you may eat up to 12 ounces of fish or shellfish that have low levels of mercury  These include shrimp, canned light tuna, salmon, pollock, and catfish  Eat only 6 ounces of albacore (white) tuna per week  Albacore tuna has more mercury than canned tuna  Do not eat shark, swordfish, marshal mackerel, or tilefish  · Raw and undercooked foods: You should not eat undercooked or raw meat, poultry, eggs, fish, or shellfish (shrimp, crab, lobster)  Cook leftover foods and ready-to-eat foods such as hot dogs until they are steaming hot  · Unpasteurized food:  Unpasteurized foods are foods that have not gone through the heating process (pasteurization) that destroys bacteria  You should not drink milk, juice, or cheese that has not been pasteurized  This includes Brie, feta, Camembert, blue, and [place] cheeses  Which foods can I eat while I am pregnant? Eat a variety of foods from each of the food groups listed below  Your dietitian will tell you how many servings you should have from each food group each day to get enough calories  The amount of calories you need depends on your daily activity, your weight before pregnancy, and current weight gain  Healthcare providers divide pregnancy into 3 periods of time called trimesters  In the first trimester, you usually do not need extra calories  In the second and third trimesters, most women should eat about 300 extra calories each day  · Fruits and vegetables:  Half of your plate should contain fruits and vegetables  ¨ Fruits:  Choose fresh, canned, or dried fruit as often as possible  ¨ 1 cup of sliced, diced, cooked, or canned fruit (canned in light syrup or 100% juice)    ¨ 1 large peach, orange, or banana    ¨ ½ cup of dried fruit    ¨ 1 cup of fruit juice    ¨ Vegetables:  Eat more dark green, red, and orange vegetables   Dark green vegetables include broccoli, spinach, dread lettuce, and billy greens  Examples of orange and red vegetables are carrots, sweet potatoes, winter squash, oranges, and red peppers  ¨ 1 cup of cooked or raw vegetables    ¨ 1 cup of vegetable juice    ¨ 2 cups of raw leafy greens    · Grains:  Half of the grains you eat each day should be whole grains  ¨ Whole grains:      ¨ ½ cup of cooked brown rice or cooked oatmeal    ¨ 1 cup (1 ounce) of whole-grain dry cereal    ¨ 1 slice of 411% whole-wheat or rye bread    ¨ 3 cups of popped popcorn    ¨ Other grains:      ¨ ½ cup of cooked white rice or pasta    ¨ ½ of an English muffin    ¨ 1 small flour or corn tortilla    ¨ 1 mini-bagel    · Dairy foods:  Choose fat-free or low-fat dairy foods:    ¨ 1½ ounces of hard cheese (mozzarella, Swiss, cheddar)     ¨ 1 cup (8 ounces) of low-fat or fat-free milk or yogurt    ¨ 1 cup of low-fat frozen yogurt or pudding    · Meat and other protein sources:  Choose lean meats and poultry  Bake, broil, and grill meat instead of frying it  Include a variety of seafood in place of some meat and poultry each week  Eat a variety of protein foods:    ¨ ½ ounce of nuts (12 almonds, 24 pistachios, 7 walnut halves) or 1 tablespoon of peanut butter (1 ounce)    ¨ ¼ cup of soy tofu or tempeh (1 ounce)    ¨ 1 egg    ¨ ¼ cup of cooked dried beans, peas, or lentils (1 ounce)    ¨ 1 small chicken breast or 1 small trout (about 3 ounces)    ¨ 1 salmon steak (4 to 6 ounces)    ¨ 1 small lean hamburger (2 to 3 ounces)    · Fats:  Limit saturated fats, trans fats, and cholesterol  These unhealthy fats are found in shortening, butter, stick margarine, and animal fat  Choose healthy fats such as polyunsaturated and monounsaturated fats:     ¨ 1 tablespoon of canola, olive, corn, sunflower, or soybean oil    ¨ 1 tablespoon of soft margarine    ¨ 1 teaspoon of mayonnaise    ¨ 2 tablespoons of salad dressing    ¨ ½ of an avocado  What vitamin and mineral supplements may I need? Your healthcare provider will tell you if you need a supplement and the type you should take  Talk to your healthcare provider before you take any other kind of supplement, including herbal (natural) supplements  · Prenatal vitamins:  Eat a variety of healthy foods, even if you take a prenatal vitamin  If you forget to take your vitamin, do not take double the amount the next day  · Folic acid:  You need at least 570 mcg of folic acid each day before you get pregnant  Folic acid helps to form your baby's brain and spinal cord in early pregnancy  During pregnancy, your daily need for folic acid increases to about 600 mcg  Get folic acid each day by eating citrus fruits and juices, green leafy vegetables, liver, or dried beans  Folic acid is also added to foods such as breakfast cereals, bread products, flour, and pasta  · Iron:  Iron is a mineral the body needs to make hemoglobin, which is a part of red blood cells  Hemoglobin helps your blood carry oxygen from the lungs to the rest of your body  Foods that are good sources of iron are meat, poultry, fish, beans, spinach, and fortified cereals and breads  Your body will absorb iron better from non-meat sources if you have a source of vitamin C at the same time  Drink tea and coffee separately from iron-fortified foods and iron supplements  You need about 30 mg of iron each day during pregnancy  · Calcium and vitamin D:  Women who do not eat dairy products may need a calcium and vitamin D supplement  Talk to your healthcare provider about calcium supplements if you do not regularly eat good sources of calcium  The amount of calcium you need is about 1,300 mg if you are between 15and 25years old and 1,000 mg if you are 23to 48years old  What diet changes may help if I have morning sickness? Morning sickness is common during the first few months of pregnancy  You may feel nauseated, and you may vomit several times each day   To improve symptoms of morning sickness, eat small, frequent meals instead of 3 large meals  Foods high in carbohydrate, such as crackers, dry toast, and pasta, may be easier for you to eat  Drink liquids between meals rather than with meals  What diet changes may decrease constipation? A high-fiber diet can improve the symptoms of constipation  Whole-grain breakfast cereals, whole-grain breads, and prune juice are high in fiber  Raw fruits and vegetables, and cooked beans are also good sources of fiber  It may also be helpful to increase your intake of fluids and get regular physical activity  Talk with your healthcare provider before you begin any exercise program    What diet changes may decrease heartburn? To improve the symptoms of heartburn, do not lie down right after you eat  When you do lie down, sleep with your head slightly elevated  Eat small, frequent meals instead of 3 large meals  It may also be helpful to avoid caffeine, chocolate, and spicy foods  How can I get enough calcium if I cannot tolerate dairy foods? If you cannot drink milk or eat dairy foods, try lactose-free or lactose-reduced milk or calcium-fortified soy milk  Ask your healthcare provider about pills you can take to help you digest milk products  Eat other drinks and foods that are fortified with calcium, such as orange juice  What other healthy guidelines should I follow? · Vegetarians and vegans: If you are a vegetarian or vegan, get enough protein, vitamin B12, and iron during your pregnancy  Some non-meat sources of these nutrients are fortified cereals, nut butters, soy products (tofu and soymilk), nuts, grains, and legumes  These nutrients are also found in eggs and milk products  · Cravings: You may have cravings for certain foods during your pregnancy  Foods that are high in calories, fat, and sugar should not replace healthy food choices   Some women have cravings for unusual substances such as carli, dirt, laundry starch, ice, and heath  This condition is called pica  These may lead to health problems such as anemia and cause other health problems  When should I contact my healthcare provider? · You are losing weight without trying  · You have cravings for substances such as carli, dirt, laundry starch, or ice  · You have questions or concerns about your condition or care  CARE AGREEMENT:   You have the right to help plan your care  Discuss treatment options with your caregivers to decide what care you want to receive  You always have the right to refuse treatment  The above information is an  only  It is not intended as medical advice for individual conditions or treatments  Talk to your doctor, nurse or pharmacist before following any medical regimen to see if it is safe and effective for you  © 2017 [address] Information is for End User's use only and may not be sold, redistributed or otherwise used for commercial purposes  All illustrations and images included in CareNotes® are the copyrighted property of [employer]  or Wolfgang Tracy  Nausea and Vomiting in Pregnancy   AMBULATORY CARE:   Nausea and vomiting in pregnancy  can happen any time of day  These symptoms usually start before the 9th week of pregnancy, and end by the 14th week (second trimester)  Some women can have nausea and vomiting for a longer time  These symptoms can affect some women throughout the entire pregnancy  Nausea and vomiting do not harm your baby  These symptoms can make it hard for you to do your daily activities  Seek care immediately if:   · You have signs of dehydration  Examples are dark yellow urine, dry mouth and lips, dry skin, fast heartbeat, and urinating less than usual     · You have severe abdominal pain  · You feel too weak or dizzy to stand up  · You see blood in your vomit or bowel movements    Contact your healthcare provider if:   · You vomit more than 4 times in 1 day     · You have not been able to keep liquids down for more than 1 day  · You lose more than 2 pounds  · You have a fever  · Your nausea and vomiting continue longer than 14 weeks  · You have questions or concerns about your condition or care  Treatment  for nausea and vomiting in pregnancy is usually not needed  You can make changes in the foods you eat and in your activities to help manage your symptoms  You may need to try several things to learn what works for you  Talk to your healthcare provider if your symptoms do not decrease with the changes suggested below  You may need vitamin B6 and medicine if these changes do not help, or your symptoms become severe  Nutrition changes you can make to manage nausea and vomiting:   · Eat small meals throughout the day instead of 3 large meals  You may be more likely to have nausea and vomiting when your stomach is empty  Eat foods that are low in fat and high in protein  Examples are lean meat, beans, turkey, and chicken without the skin  Eat a small snack, such as crackers, dry cereal, or a small sandwich before you go to bed  · Eat some crackers or dry toast before you get out of bed in the morning  Get out of bed slowly  Sudden movements could cause you to get dizzy and nauseated  · Eat bland foods when you feel nauseated  Examples of bland foods include dry toast, dry cereal, plain pasta, white rice, and bread  Other bland foods include saltine crackers, bananas, gelatin, and pretzels  Avoid spicy, greasy, and fried foods  Avoid any other foods that make you feel nauseated  · Drink liquids that contain ginger  Drink ginger ale made with real ginger or ginger tea made with fresh grated ginger  Ginger capsules or ginger candies may also help to decrease nausea and vomiting  · Drink liquids between meals instead of with meals  Wait at least 30 minutes after you eat to drink liquids   Drink small amounts of liquids often throughout the day to prevent dehydration  Ask how much liquid you should drink each day  Other changes you can make to manage nausea and vomiting:   · Avoid smells that bother you  Strong odors may cause nausea and vomiting to start, or make it worse  Take a short walk, turn on a fan, or try to sleep with the window open to get fresh air  When you are cooking, open windows to get rid of smells that may cause nausea  · Do not brush your teeth right after you eat  if it makes you nauseated  · Rest when you need to  Start activity slowly and work up to your usual routine as you start to feel better  · Talk to your healthcare provider about your prenatal vitamins  Prenatal vitamins can cause nausea for some women  Try taking your prenatal vitamin at night or with a snack  If this change does not help, your healthcare provider may recommend a different type of vitamin  · Do not use any medicines, vitamins, or supplements to manage your symptoms without asking your healthcare provider  Many medicines can harm an unborn baby  · Light to moderate exercise  may help to decrease your symptoms  It may also help you to sleep better at night  Ask your healthcare provider about the best exercise plan for you  Follow up with your healthcare provider as directed:  Write down your questions so you remember to ask them during your visits  © 2017 [facility] [facility] Information is for End User's use only and may not be sold, redistributed or otherwise used for commercial purposes  All illustrations and images included in CareNotes® are the copyrighted property of [employer]  or Wolfgang Tracy  The above information is an  only  It is not intended as medical advice for individual conditions or treatments  Talk to your doctor, nurse or pharmacist before following any medical regimen to see if it is safe and effective for you        Zika Virus: Information for Pregnant Women   AMBULATORY CARE:   Zika virus  Zika virus is carried by mosquitos  The virus is spread to a human through the bite of an infected mosquito  The virus may also be passed from one person to another through sex  [place] virus may be passed from a mother to her unborn baby  This may cause birth defects such as poor brain development  It may also cause pregnancy loss  There is currently no vaccine to prevent Zika virus infection  Common signs and symptoms include the following: You may not have signs or symptoms of Zika virus  If you develop symptoms, they may happen suddenly and last for 2 to 7 days  You may have any of the following:  · Fever    · Rash    · Headache    · Muscle or joint pain    · Red or itchy eyes  Contact your healthcare provider if:   · You think you have been exposed to [place] virus  · You have symptoms of Zika virus  · You have questions or concerns about your condition or care  Prevent mosquito bites:  Do not travel to areas where [place] virus is common  Ask your healthcare provider where it is safe to travel  Prevent mosquito bites to help decrease your risk for Zika virus infection:  · Apply insect repellent  Ask your healthcare provider which insect repellent is right for you  Most insect repellents are safe to use during pregnancy  Follow directions on the insect repellent container  The following is a list of tips for insect repellent use:     ¨ Do not  apply insect repellent to skin under clothing  ¨ Apply sunscreen before you apply insect repellent  ¨ Wear insect repellent any time you plan to be outside  Wear insect repellent at all times if you travel or live in a high-risk area  Reapply insect repellent as directed  ¨ Apply insect repellent every day for 3 weeks after you travel to high-risk areas  · Wear a long-sleeved shirt and pants  This will protect your skin from mosquito bites  · Use screens and nets  Use a mosquito net around your bed   When you travel, choose a place to stay with screens on all windows and doors  Place screens over windows and doors in your home  Fix holes or tears in screens and nets, or buy new screens and nets  · Keep doors and windows closed  If possible, use air conditioning to cool your home  · Apply insect repellent to clothing and gear  This includes boots, pants, socks, and tents  Do this when you camp, hike, or work outside  You can also buy clothing and gear that comes with insect repellent already on it  · Clean and empty containers of water once a week  Examples are animal bowls, buckets of water, gutters, flower vases, and bird baths  Mosquitoes lay eggs near water  Empty the water and scrub these containers with soap and water  Keep water containers covered with a tight-fitting lid, when possible  · Use insect sprays inside and outside of your home  Use an insect spray that is safe to use inside of your home  Place a device that sprays mosquitoes outside of your home  Place it in a dark, cool, area  Ask your healthcare provider where to buy these items  Follow directions that come with these products  Practice safe sex during pregnancy: The following will decrease your risk for Zika virus  It will also decrease the risk that you will pass Zika virus to your baby  · Do not have sex with a man or a woman who is infected with Zika virus while you are pregnant  Do not have sex with a man or a woman who has been exposed to [place] virus while you are pregnant  Your partner may be at risk for exposure if he or she has traveled to an area with [place] infection  Sex includes oral, vaginal, and anal sex  · If you choose to have sex during pregnancy, use a condom or latex barrier every time you have sex  Use protection for all types of sexual contact with a man or woman  This includes oral, vaginal, and anal sex  Use a new condom or latex barrier each time you have sex  Make sure that the condom fits and is put on correctly   If you are allergic to latex, use a nonlatex product such as polyurethane  For the most up-to-date information on Zika virus:  Knowledge about the [place] virus is changing quickly  Get the most up-to-date information at:  · Centers for Disease Control and Prevention Information on [address]  [address] [facility]  Phone: 3- 596 - [phone]  Web Address: [place] [address]  Follow up with your healthcare provider as directed:  Write down your questions so you remember to ask them during your visits  © 2017 [address] [employer]  Information is for End User's use only and may not be sold, redistributed or otherwise used for commercial purposes  All illustrations and images included in CareNotes® are the copyrighted property of A D A M , Inc  or Wolfgang Tracy  The above information is an  only  It is not intended as medical advice for individual conditions or treatments  Talk to your doctor, nurse or pharmacist before following any medical regimen to see if it is safe and effective for you  Influenza Vaccine   AMBULATORY CARE:   The influenza vaccine  is an injection given to help prevent influenza (flu)  The flu is caused by a virus  The virus spreads from person to person through coughing and sneezing  Several types of viruses cause the flu  The viruses change over time, so new vaccines are made each year  The vaccine begins to protect you about 2 weeks after you get it  The flu shot usually injected into your upper arm  It may be given in your thigh  You may get a vaccine with a weak or dead virus  Call 911 for any of the following:   · Your mouth and throat are swollen  · You are wheezing or have trouble breathing  · You have chest pain or your heart is beating faster than normal for you  · You feel like you are going to faint  Seek care immediately if:   · Your face is red or swollen      · You have hives that spread over your body     · You feel weak or dizzy  Contact your healthcare provider if:   · You have increased pain, redness, or swelling around the area where the shot was given  · You have questions or concerns about the influenza vaccine  When to get the influenza vaccine: The influenza vaccine is offered every year starting in September or October  Get the influenza vaccine as soon as it is available  Children 6 months to 6years old need 2 doses during the first year they get the vaccine  The 2 doses should be given at least 4 weeks apart  It is best if the same type of vaccine is given both times  The child can then receive 1 dose each year  Children 9 years or older should get 1 dose each year  Who should get the flu shot:   · Infants 6 months or older    · Any healthy adult who would like to decrease the risk for the flu    · Anyone living with or caring for children younger than 5 years     · Healthcare workers    · Anyone who lives in a long-term care facility    · Anyone who has chronic health problems, such as asthma, diabetes, or blood disorders    · Anyone who has a weak immune system    · Women who are or will be pregnant during the flu season  Who should not get the flu shot:  If you have an egg allergy, ask your healthcare provider if it is safe to get the flu shot  You will need to be closely monitored by a healthcare provider while you receive the vaccine, and for an hour or more after  The following should not get the flu shot:  · Infants younger than 6 months     · Anyone who has had an allergic reaction to the flu shot    · Anyone who is sick or has a fever    · Anyone who received a diagnosis of Guillain-Barré syndrome within 6 weeks of getting a flu vaccine    · Anyone who is allergic to thimerosal (mercury)  Risks of the influenza vaccine: The flu shot may cause mild symptoms, such as a fever, headache, and muscle aches   It may also cause mild to moderate soreness or redness at the area where you were given the shot  The nasal spray may cause a fever, runny or stuffy nose, headache, muscle aches, or vomiting  You may still get the flu after you receive the influenza vaccine  If you are allergic to eggs, ask about an egg-free vaccine  You may have an allergic reaction to the vaccine  This can be life-threatening  Follow up with your healthcare provider as directed:  Write down your questions so you remember to ask them during your visits  © 2017 [address] [employer]  Information is for End User's use only and may not be sold, redistributed or otherwise used for commercial purposes  All illustrations and images included in CareNotes® are the copyrighted property of A D A M , Inc  or Wolfgang Rossana  The above information is an  only  It is not intended as medical advice for individual conditions or treatments  Talk to your doctor, nurse or pharmacist before following any medical regimen to see if it is safe and effective for you  Tdap:     Diphtheria/Acellular Pertussis/Tetanus Booster Vaccine (Tdap) (By injection)   Pertussis Vaccine, Acellular (per-TUS-iss VAX-een, a-HDIA-uvc-lar), Reduced Diphtheria Toxoid (ree-DOOST dif-THEER-ee-a TOX-oyd), Tetanus Toxoid (TET-a-nus TOX-oyd)  Protects against infections caused by tetanus (lockjaw), diphtheria, or pertussis (whooping cough)  This is a booster vaccine  Brand Name(s): Adacel, Boostrix   There may be other brand names for this medicine  When This Medicine Should Not Be Used: You should not receive this vaccine if you have had an allergic reaction to the separate or combined tetanus, diphtheria, or pertussis vaccine  You should not receive this vaccine if you have had seizures, mental changes, or any other serious reaction within 7 days after you received a pertussis vaccine  How to Use This Medicine:   Injectable  · A nurse or other health provider will give you this medicine    · Your doctor will prescribe your exact dose and tell you how often it should be given  This medicine is given as a shot into one of your muscles  · You may receive other vaccines at the same time as this one, but in a different body area  You should receive patient instructions for all of the vaccines  Talk to your doctor or nurse if you have questions  · Read and follow the patient instructions that come with this medicine  Talk to your doctor or pharmacist if you have any questions  Drugs and Foods to Avoid:   Ask your doctor or pharmacist before using any other medicine, including over-the-counter medicines, vitamins, and herbal products  · Make sure the doctor knows if you are receiving a treatment or medicine that weakens your immune system  This includes radiation treatment, steroid medicines (such as dexamethasone, hydrocortisone, methylprednisolone, prednisolone, prednisone, Medrol®), or cancer medicines  Warnings While Using This Medicine:   · Make sure your doctor knows if you are pregnant or breastfeeding or have epilepsy, a weak immune system, or a history of a stroke  Tell your doctor if you are sick or have a fever  · Tell your doctor about any reaction you had after you received a vaccine  This includes fainting, seizures, a fever over 105 degrees F, or severe redness or swelling where the shot was given  Tell your doctor if you have a history of Guillain-Barré syndrome after you received a vaccine with tetanus  · Call your doctor right away if you faint or have vision changes, numbness or tingling in your arms, hands, or feet, or a seizure after you receive this vaccine  · Tell your doctor if you have an allergy to latex  The syringes may contain dry natural latex rubber  · This vaccine will not treat an active infection  If you have a diphtheria, tetanus, or pertussis infection, you will need medicine to treat the infection    Possible Side Effects While Using This Medicine:   Call your doctor right away if you notice any of these side effects:  · Allergic reaction: Itching or hives, swelling in your face or hands, swelling or tingling in your mouth or throat, chest tightness, trouble breathing  · Changes in vision  · Fever over 105 degrees F  · Lightheadedness or fainting  · Numbness, tingling, or burning pain in your hands, arms, legs, or feet  · Seizures  · Sudden numbness or weakness in your arms or legs  · Severe pain, redness, or swelling where the shot was given  If you notice these less serious side effects, talk with your doctor:   · Headache  · Mild pain, redness, or swelling where the shot was given  · Nausea, vomiting, diarrhea, or stomach pain  · Tiredness  If you notice other side effects that you think are caused by this medicine, tell your doctor  Call your doctor for medical advice about side effects  You may report side effects to FDA at 3-095-FDA-4228  © 2017 [address] Information is for End User's use only and may not be sold, redistributed or otherwise used for commercial purposes  The above information is an  only  It is not intended as medical advice for individual conditions or treatments  Talk to your doctor, nurse or pharmacist before following any medical regimen to see if it is safe and effective for you

## 2019-02-13 NOTE — PROGRESS NOTES
OB Intake  o Patient presents for OB intake interview  o Accompanied by: Self  o FOB:  - Involved: Yes  - Planned pregnancy: No    o [id number]    o Hx of  delivery prior to 36 weeks 6 days:                             no  o LMP:   Patient's last menstrual period was 2018 (approximate)  o U/S date: 2019   - 7 weeks  4 days  o Estimated Date of Delivery: 2019     - confirmed by U/S    o Signs and Symptoms of pregnancy:               breast tenderness, fatigue, frequent urination, morning sickness, nausea and positive home pregnancy test  - Constipation:    yes  - Headaches:   no  - Cramping/spotting:    Yes, cramping at times  - PICA cravings:    no  - Diabetes: If you answer yes, please order 1 hr gtt testing, 50grams   History of gestational diabetes    no   BMI >35     no   Advance maternal age >35   no   First degree relative with type 2 diabetes    no   History of PCOS   no   Current metformin use    no   Prior history of macrosomia or LGA    no  o Immunization Record  Immunization History   Administered Date(s) Administered    Rho (D) Immune Globulin 2019   -   o Tdap:  - Counseled to be given after 28 weeks  o Influenza vaccine discussed  o MRSA questionnaire:     Positive- lab ordered for MRSA nasal swab  o Dental visit within last 6 months  - No  - Recommendations discussed  Interview education:  Educational material provided on After Visit Summary (AVS)   Handouts given at todays visit  o Evaristo Santoro & me phone application guide  o [address] [place] [address] support center  o CDCs Response to [address] [place] [address] Maternal Fetal Medicine  - Sequential screening pamphlet  - Cystic fibrosis pamphlet  o MELISSA letter given  - [address] Work   - Dentist    Nurse Family Partnership:    No  ONAF form submitted:    No, pt is M  A  pending    [facility]not 1518 years of age)  No  - Code provided:   Yes, on AVS    Interview done by: Eitan Matt Shira Gomez, RN 02/13/19    There are no diagnoses linked to this encounter  Pt with hx of PP depression  EPDS=9 today  Pt declined need for referral to SW today

## 2019-02-14 LAB
BLOOD GROUP ANTIBODIES SERPL: NORMAL
HIV 1+2 AB+HIV1 P24 AG SERPL QL IA: NORMAL
MRSA NOSE QL CULT: NORMAL
RPR SER QL: NORMAL

## 2019-02-15 ENCOUNTER — ROUTINE PRENATAL (OUTPATIENT)
Dept: OBGYN CLINIC | Facility: CLINIC | Age: 29
End: 2019-02-15

## 2019-02-15 VITALS
HEART RATE: 85 BPM | BODY MASS INDEX: 33.05 KG/M2 | DIASTOLIC BLOOD PRESSURE: 76 MMHG | HEIGHT: 62 IN | WEIGHT: 179.6 LBS | SYSTOLIC BLOOD PRESSURE: 114 MMHG

## 2019-02-15 DIAGNOSIS — Z71.6 ENCOUNTER FOR SMOKING CESSATION COUNSELING: ICD-10-CM

## 2019-02-15 DIAGNOSIS — Z34.91 PRENATAL CARE IN FIRST TRIMESTER: ICD-10-CM

## 2019-02-15 DIAGNOSIS — N30.00 ACUTE CYSTITIS WITHOUT HEMATURIA: Primary | ICD-10-CM

## 2019-02-15 LAB
BACTERIA UR CULT: ABNORMAL
C TRACH DNA SPEC QL NAA+PROBE: NEGATIVE
N GONORRHOEA DNA SPEC QL NAA+PROBE: NEGATIVE

## 2019-02-15 PROCEDURE — 87591 N.GONORRHOEAE DNA AMP PROB: CPT | Performed by: OBSTETRICS & GYNECOLOGY

## 2019-02-15 PROCEDURE — 87491 CHLMYD TRACH DNA AMP PROBE: CPT | Performed by: OBSTETRICS & GYNECOLOGY

## 2019-02-15 PROCEDURE — 99213 OFFICE O/P EST LOW 20 MIN: CPT | Performed by: OBSTETRICS & GYNECOLOGY

## 2019-02-15 RX ORDER — NITROFURANTOIN 25; 75 MG/1; MG/1
100 CAPSULE ORAL 2 TIMES DAILY
Qty: 10 CAPSULE | Refills: 0 | Status: SHIPPED | OUTPATIENT
Start: 2019-02-15 | End: 2019-02-20

## 2019-02-15 NOTE — PROGRESS NOTES
OB/GYN  PRENATAL H&P VISIT  Abril Norwood  2/15/2019  12:01 PM  Dr Doug Noble, DO    [address] [facility] patient is here for initial prenatal H&P  This is an unintended pregnancy  Patient is currently doing well  No vaginal bleeding currently, however was seen in the ED in January complaining of vaginal bleeding  She is here with son  Her previous pregnancies have been delivered via   Initial attempted vaginal delivery failure to progress therefore proceeded with   Following deliveries delivery via  with indication being previous   She currently works as   She has good a support system at home  She denies hx of STD/STI, denies a hx of TB or close contacts with persons with TB  She denies a family history of inheritable conditions such as physical or intellectual disabilities, birth defects, blood disorders, heart or neural tube defects on her side of the family however, states that her  has family history of down syndrome  She has hx of  MRSA  No recent travel or travel planned in the near future  Currently smokes 4 cigarettes per day  Patient drinks 1 cup coffee a day  MELISSA 19    She denies vaginal bleeding, cramping, leakage, abnormal discharge  Review of Systems   Constitutional: Negative for chills and fever  Respiratory: Negative for shortness of breath  Cardiovascular: Negative for chest pain  Gastrointestinal: Negative for abdominal pain and blood in stool  Genitourinary: Positive for difficulty urinating  Negative for dysuria, flank pain, menstrual problem, vaginal bleeding, vaginal discharge and vaginal pain  Skin: Negative for rash  Neurological: Positive for light-headedness and headaches  Past Medical History:   Diagnosis Date    Anemia     Anemia during pregnancy   Anxiety     Asthma     Depression     Postpartum depression      Kidney infection     Kidney stone     Miscarriage     MRSA infection  Rh incompatibility     Urinary tract infection     Varicella     Pt said she had chicken pox twice when she was younger         Past Surgical History:   Procedure Laterality Date    ADENOIDECTOMY       SECTION      DILATION AND CURETTAGE OF UTERUS  2014    TONSILLECTOMY         Social History     Socioeconomic History    Marital status: Single     Spouse name: Not on file    Number of children: Not on file    Years of education: Not on file    Highest education level: Not on file   Occupational History    Not on file   Social Needs    Financial resource strain: Not on file    Food insecurity:     Worry: Not on file     Inability: Not on file    Transportation needs:     Medical: Not on file     Non-medical: Not on file   Tobacco Use    Smoking status: Current Every Day Smoker     Packs/day: 0 00     Types: Cigarettes    Smokeless tobacco: Never Used    Tobacco comment: approx 2 cigarettes   Substance and Sexual Activity    Alcohol use: No    Drug use: Yes     Types: Marijuana     Comment: last use approx 2018    Sexual activity: Yes     Partners: Male     Birth control/protection: None   Lifestyle    Physical activity:     Days per week: Not on file     Minutes per session: Not on file    Stress: Not on file   Relationships    Social connections:     Talks on phone: Not on file     Gets together: Not on file     Attends Alevism service: Not on file     Active member of club or organization: Not on file     Attends meetings of clubs or organizations: Not on file     Relationship status: Not on file    Intimate partner violence:     Fear of current or ex partner: Not on file     Emotionally abused: Not on file     Physically abused: Not on file     Forced sexual activity: Not on file   Other Topics Concern    Not on file   Social History Narrative    Not on file       OBJECTIVE  Vitals:    02/15/19 1050   BP: 114/76   Pulse: 85     Physical Exam   Constitutional: She is oriented to person, place, and time  She appears well-developed and well-nourished  Genitourinary: Vagina normal    HENT:   Head: Normocephalic and atraumatic  Right Ear: External ear normal    Left Ear: External ear normal    Nose: Nose normal    Mouth/Throat: Oropharynx is clear and moist    Eyes: Conjunctivae and EOM are normal    Neck: Normal range of motion  Neck supple  Cardiovascular: Normal rate, regular rhythm, normal heart sounds and intact distal pulses  No murmur heard  Pulmonary/Chest: Effort normal and breath sounds normal    Abdominal: Bowel sounds are normal  There is no tenderness  Musculoskeletal: Normal range of motion  Neurological: She is alert and oriented to person, place, and time  Skin: Skin is warm  No rash noted  Psychiatric: She has a normal mood and affect  Thought content normal      ASSESSMENT AND PLAN    34 y o , [id number], with /76 (BP Location: Left arm)   Pulse 85   Ht 5' 2" (1 575 m)   Wt 81 5 kg (179 lb 9 6 oz)   LMP 12/11/2018 (Approximate)       1  Pregnancy: H&P completed today  PN Labs reviewed today  Urine culture 50,000-59,000 cfu/mL E coli  HIV-1/HIV-2 antibody nonreactive  RPR nonreactive  Type screen: O-, Antibody screen positive  Hepatitis-B surface antigen nonreactive  Rubella immune  CBC: Hgb 13 1/Hct 40 1    US 2/1/19 GA 7 weeks 4 days  1/27/2019 OB [facility] official report: "Single living intrauterine gestation  Estimated gestational age by crown-rump length is 6 weeks and 6 days which roughly correlates with the estimated gestational age by dates  Uterine fibroids as described  Left ovarian corpus luteum; bilateral ovaries otherwise appear unremarkable  Clinical follow-up recommended  Additional imaging may be considered as clinically warranted or at the discretion of the referring caregiver  Expected date of confinement by ultrasound 9/17/2018"   Labor expectations discussed with patient, including appointment schedule, nutrition, weight gain, exercise, medications, sexual intercourse, and nausea/vomiting  2  Screening: Last pap 17 negative for intraepithelial lesion or malignancy  GC/CT collected today, follow-up results  Sequential screening reviewed with patient, advised follow-up at  center  3  Consents: Delivery process including potential OVD and  to be completed at a following visit  4  UTI: Macrobid 100 mg po bid x 5 days  5  Smoking cessation counseling  6  Follow up: RTC in 4 weeks or sooner as needed  Precautions regarding labor, leakage, bleeding, and fetal movement reviewed      D/w Dr Apple Olivo and Dr Jerman Polanco,   2/15/2019  12:01 PM

## 2019-03-14 ENCOUNTER — ROUTINE PRENATAL (OUTPATIENT)
Dept: PERINATAL CARE | Facility: CLINIC | Age: 29
End: 2019-03-14
Payer: COMMERCIAL

## 2019-03-14 VITALS
HEART RATE: 84 BPM | BODY MASS INDEX: 33.34 KG/M2 | HEIGHT: 62 IN | SYSTOLIC BLOOD PRESSURE: 109 MMHG | WEIGHT: 181.2 LBS | DIASTOLIC BLOOD PRESSURE: 70 MMHG

## 2019-03-14 DIAGNOSIS — O34.219 MATERNAL CARE FOR SCAR FROM PREVIOUS CESAREAN DELIVERY, UNSPECIFIED PRIOR CESAREAN DELIVERY TYPE: Primary | ICD-10-CM

## 2019-03-14 DIAGNOSIS — Z34.91 PREGNANT AND NOT YET DELIVERED IN FIRST TRIMESTER: ICD-10-CM

## 2019-03-14 DIAGNOSIS — Z3A.13 13 WEEKS GESTATION OF PREGNANCY: ICD-10-CM

## 2019-03-14 DIAGNOSIS — Z36.82 ENCOUNTER FOR NUCHAL TRANSLUCENCY TESTING: ICD-10-CM

## 2019-03-14 PROCEDURE — 76801 OB US < 14 WKS SINGLE FETUS: CPT | Performed by: OBSTETRICS & GYNECOLOGY

## 2019-03-14 PROCEDURE — 76813 OB US NUCHAL MEAS 1 GEST: CPT | Performed by: OBSTETRICS & GYNECOLOGY

## 2019-03-14 PROCEDURE — 99241 PR OFFICE CONSULTATION NEW/ESTAB PATIENT 15 MIN: CPT | Performed by: OBSTETRICS & GYNECOLOGY

## 2019-03-14 NOTE — PROGRESS NOTES
The patient was seen today for an ultrasound  Please see ultrasound report (located under Ob Procedures) for additional details  Thank you very much for allowing us to participate in the care of this very nice patient  Should you have any questions, please do not hesitate to contact me  Omar Steele MD [address]  Attending Physician, Angel[place]

## 2019-03-18 ENCOUNTER — ROUTINE PRENATAL (OUTPATIENT)
Dept: OBGYN CLINIC | Facility: CLINIC | Age: 29
End: 2019-03-18

## 2019-03-18 VITALS
BODY MASS INDEX: 33.13 KG/M2 | SYSTOLIC BLOOD PRESSURE: 119 MMHG | HEIGHT: 62 IN | HEART RATE: 83 BPM | WEIGHT: 180 LBS | DIASTOLIC BLOOD PRESSURE: 66 MMHG

## 2019-03-18 DIAGNOSIS — K59.00 CONSTIPATION DURING PREGNANCY IN SECOND TRIMESTER: ICD-10-CM

## 2019-03-18 DIAGNOSIS — O99.612 CONSTIPATION DURING PREGNANCY IN SECOND TRIMESTER: ICD-10-CM

## 2019-03-18 DIAGNOSIS — O34.219 MATERNAL CARE FOR SCAR FROM PREVIOUS CESAREAN DELIVERY, UNSPECIFIED PRIOR CESAREAN DELIVERY TYPE: Primary | ICD-10-CM

## 2019-03-18 DIAGNOSIS — O99.512 ASTHMA AFFECTING PREGNANCY IN SECOND TRIMESTER: ICD-10-CM

## 2019-03-18 DIAGNOSIS — Z3A.14 14 WEEKS GESTATION OF PREGNANCY: ICD-10-CM

## 2019-03-18 DIAGNOSIS — J45.909 ASTHMA AFFECTING PREGNANCY IN SECOND TRIMESTER: ICD-10-CM

## 2019-03-18 DIAGNOSIS — L29.2 VULVAR ITCHING: ICD-10-CM

## 2019-03-18 PROCEDURE — 87147 CULTURE TYPE IMMUNOLOGIC: CPT | Performed by: NURSE PRACTITIONER

## 2019-03-18 PROCEDURE — 87086 URINE CULTURE/COLONY COUNT: CPT | Performed by: NURSE PRACTITIONER

## 2019-03-18 PROCEDURE — 99213 OFFICE O/P EST LOW 20 MIN: CPT | Performed by: NURSE PRACTITIONER

## 2019-03-18 RX ORDER — DOCUSATE SODIUM 100 MG/1
100 CAPSULE, LIQUID FILLED ORAL 2 TIMES DAILY PRN
Qty: 90 CAPSULE | Refills: 1 | Status: SHIPPED | OUTPATIENT
Start: 2019-03-18 | End: 2019-10-01 | Stop reason: ALTCHOICE

## 2019-03-18 RX ORDER — NYSTATIN AND TRIAMCINOLONE ACETONIDE 100000; 1 [USP'U]/G; MG/G
OINTMENT TOPICAL 2 TIMES DAILY
Qty: 30 G | Refills: 0 | Status: SHIPPED | OUTPATIENT
Start: 2019-03-18 | End: 2019-08-07

## 2019-03-18 NOTE — PROGRESS NOTES
Denies loss of fluid, vaginal bleeding and abdominal pain  Confirms fetal movement, flutters  Tolerating prenatal vitamin well  Has not needed to use rescue inhaler  Was recently treated for urinary tract infection 50-59,000 CFU E coli  Will send COLIN today  Prenatal labs reviewed  Gonorrhea/chlamydia reviewed  Had part 1 of sequential screen  Reviewed recommendations for flu vaccine, patient declines  Patient complains today of clitoral itching  Occasional has not tried any over-the-counter remedies  Denies vaginal discharge, vaginal itching or pain  Complains of constipation with bowel movements every 2-3 days  Desires permanent female sterilization  Will have patient sign MA 31 between 28 and 30 weeks  Early ultrasound reviewed  Plan:  1  Continue prenatal vitamins daily  2  Declines flu vaccine  3  UTI pregnancy     - urine culture sent today  4  Follow-up  Center as scheduled  5  Vulvar itching      - rx Mycolog cream 1-2 times daily as needed     - patient deferred vaginal exam today  Will call with  vaginal discharge  6  Constipation     - Rx Colace 100 mg twice a day as needed  7  Common discomforts of pregnancy reviewed  RTO 4 weeks

## 2019-03-18 NOTE — PATIENT INSTRUCTIONS
Pregnancy at 15 to 18 Weeks   [address]:   What changes are happening in my body? Now that you are in your second trimester, you have more energy  You may also feel hungrier than usual  You may start to experience other symptoms, such as heartburn or dizziness  You may be gaining about ½ to 1 pound a week, and your pregnancy is beginning to show  You may need to start wearing maternity clothes  How do I care for myself at this stage of my pregnancy? · Manage heartburn  by eating 4 or 5 small meals each day instead of large meals  Avoid spicy foods  Avoid eating right before bedtime  · Manage nausea and vomiting  Avoid fatty and spicy foods  Eat small meals throughout the day instead of large meals  Whitney may help to decrease nausea  Ask your healthcare provider about other ways of decreasing nausea and vomiting  · Eat a variety of healthy foods  Healthy foods include fruits, vegetables, whole-grain breads, low-fat dairy foods, beans, lean meats, and fish  Drink liquids as directed  Ask how much liquid to drink each day and which liquids are best for you  Limit caffeine to less than 200 milligrams each day  Limit your intake of fish to 2 servings each week  Choose fish low in mercury such as canned light tuna, shrimp, salmon, cod, or tilapia  Do not  eat fish high in mercury such as swordfish, tilefish, marshal mackerel, and shark  · Take prenatal vitamins as directed  Your need for certain vitamins and minerals, such as folic acid, increases during pregnancy  Prenatal vitamins provide some of the extra vitamins and minerals you need  Prenatal vitamins may also help to decrease the risk of certain birth defects  · Do not smoke  If you smoke, it is never too late to quit  Smoking increases your risk of a miscarriage and other health problems during your pregnancy  Smoking can cause your baby to be born too early or weigh less at birth   Ask your healthcare provider for information if you need help quitting  · Do not drink alcohol  Alcohol passes from your body to your baby through the placenta  It can affect your baby's brain development and cause fetal alcohol syndrome (FAS)  FAS is a group of conditions that causes mental, behavior, and growth problems  · Talk to your healthcare provider before you take any medicines  Many medicines may harm your baby if you take them when you are pregnant  Do not take any medicines, vitamins, herbs, or supplements without first talking to your healthcare provider  Never use illegal or street drugs (such as marijuana or cocaine) while you are pregnant  What are some safety tips during pregnancy? · Avoid hot tubs and saunas  Do not use a hot tub or sauna while you are pregnant, especially during your first trimester  Hot tubs and saunas may raise your baby's temperature and increase the risk of birth defects  · Avoid toxoplasmosis  This is an infection caused by eating raw meat or being around infected cat feces  It can cause birth defects, miscarriages, and other problems  Wash your hands after you touch raw meat  Make sure any meat is well-cooked before you eat it  Avoid raw eggs and unpasteurized milk  Use gloves or ask someone else to clean your cat's litter box while you are pregnant  What changes are happening with my baby? By 18 weeks, your baby may be about 6 inches long from the top of the head to the rump (baby's bottom)  Your baby may weigh about 11 ounces  You may be able to feel your baby's movement at about 18 weeks or later  The first movements may not be that noticeable  They may feel like a fluttering sensation  Your baby also makes sucking movements and can hear certain sounds  What do I need to know about prenatal care? During the first 28 weeks of your pregnancy, you will see your healthcare provider once a month  Your healthcare provider will check your blood pressure and weight   You may also need any of the following:  · A urine test  may also be done to check for sugar and protein  These can be signs of gestational diabetes or infection  · A blood test  may be done to check for anemia (low iron level)  · Fundal height check  is a measurement of your uterus to check your baby's growth  This number is usually the same as the number of weeks that you have been pregnant  · An ultrasound  may be done to check your baby's development  Your healthcare provider may be able to tell you what your baby's gender is during the ultrasound  · Your baby's heart rate  will be checked  When should I seek immediate care? · You have pain or cramping in your abdomen or low back  · You have heavy vaginal bleeding or clotting  · You pass material that looks like tissue or large clots  Collect the material and bring it with you  When should I contact my healthcare provider? · You cannot keep food or drinks down, and you are losing weight  · You have light bleeding  · You have chills or a fever  · You have vaginal itching, burning, or pain  · You have yellow, green, white, or foul-smelling vaginal discharge  · You have pain or burning when you urinate, less urine than usual, or pink or bloody urine  · You have questions or concerns about your condition or care  CARE AGREEMENT:   You have the right to help plan your care  Learn about your health condition and how it may be treated  Discuss treatment options with your caregivers to decide what care you want to receive  You always have the right to refuse treatment  The above information is an  only  It is not intended as medical advice for individual conditions or treatments  Talk to your doctor, nurse or pharmacist before following any medical regimen to see if it is safe and effective for you    © 2017 [employer] Information is for End User's use only and may not be sold, redistributed or otherwise used for commercial purposes  All illustrations and images included in CareNotes® are the copyrighted property of A D A M , Inc  or Wolfgang Tracy

## 2019-03-20 PROBLEM — R82.71 GBS BACTERIURIA: Status: ACTIVE | Noted: 2019-03-20

## 2019-03-20 LAB
BACTERIA UR CULT: ABNORMAL

## 2019-03-27 ENCOUNTER — TELEPHONE (OUTPATIENT)
Dept: PERINATAL CARE | Facility: CLINIC | Age: 29
End: 2019-03-27

## 2019-03-27 NOTE — LETTER
03/27/19  Alesha Hand  1990    Thank you for completing Part 1 of your Sequential Screen  To obtain a complete test result, please complete blood work for Part 2 Sequential Screen between the weeks of 4/2 to 4/16/19  Based on your insurance coverage, please use one of the following locations  Call our office for any questions at [phone]      [address]   [address] [facility], [facility], [address]    Phone: [address]  [address] [facility], [address]    Phone: [address], [address] [place] [address]  Phone: [phone] [address] [facility], [address]   Phone: [phone] (*ask for lab)    [address]  [address], [facility], [address] [facility]  Phone: [phone]  Hours: Monday-Friday 6a-6p, Saturday 7a-12p    [address] [facility],[address]  [address]  [facility] [address], [address] [facility]; [facility], [address]   Phone: [address]  [address] [facility] [address]   Phone: [phone]    Sincerely,    Lillian Senior RN

## 2019-04-15 ENCOUNTER — APPOINTMENT (OUTPATIENT)
Dept: LAB | Facility: HOSPITAL | Age: 29
End: 2019-04-15
Attending: OBSTETRICS & GYNECOLOGY
Payer: COMMERCIAL

## 2019-04-15 ENCOUNTER — TRANSCRIBE ORDERS (OUTPATIENT)
Dept: LAB | Facility: HOSPITAL | Age: 29
End: 2019-04-15

## 2019-04-15 ENCOUNTER — ROUTINE PRENATAL (OUTPATIENT)
Dept: OBGYN CLINIC | Facility: CLINIC | Age: 29
End: 2019-04-15

## 2019-04-15 VITALS
BODY MASS INDEX: 33.13 KG/M2 | WEIGHT: 180 LBS | DIASTOLIC BLOOD PRESSURE: 64 MMHG | SYSTOLIC BLOOD PRESSURE: 116 MMHG | HEART RATE: 101 BPM | HEIGHT: 62 IN

## 2019-04-15 DIAGNOSIS — J45.909 ASTHMA AFFECTING PREGNANCY IN SECOND TRIMESTER: Primary | ICD-10-CM

## 2019-04-15 DIAGNOSIS — Z36.9 UNSPECIFIED ANTENATAL SCREENING: ICD-10-CM

## 2019-04-15 DIAGNOSIS — O34.219 MATERNAL CARE FOR SCAR FROM PREVIOUS CESAREAN DELIVERY, UNSPECIFIED PRIOR CESAREAN DELIVERY TYPE: ICD-10-CM

## 2019-04-15 DIAGNOSIS — O99.512 ASTHMA AFFECTING PREGNANCY IN SECOND TRIMESTER: Primary | ICD-10-CM

## 2019-04-15 DIAGNOSIS — Z33.1 PREGNANT STATE, INCIDENTAL: ICD-10-CM

## 2019-04-15 DIAGNOSIS — Z3A.18 18 WEEKS GESTATION OF PREGNANCY: ICD-10-CM

## 2019-04-15 DIAGNOSIS — O21.9 NAUSEA AND VOMITING DURING PREGNANCY: ICD-10-CM

## 2019-04-15 DIAGNOSIS — Z33.1 PREGNANT STATE, INCIDENTAL: Primary | ICD-10-CM

## 2019-04-15 PROCEDURE — 99213 OFFICE O/P EST LOW 20 MIN: CPT | Performed by: NURSE PRACTITIONER

## 2019-04-15 PROCEDURE — 36415 COLL VENOUS BLD VENIPUNCTURE: CPT

## 2019-04-15 RX ORDER — DIPHENHYDRAMINE HYDROCHLORIDE 25 MG/1
12.5 CAPSULE ORAL
Qty: 60 TABLET | Refills: 1 | Status: SHIPPED | OUTPATIENT
Start: 2019-04-15 | End: 2019-05-29

## 2019-04-16 LAB — SCAN RESULT: NORMAL

## 2019-04-22 ENCOUNTER — TELEPHONE (OUTPATIENT)
Dept: PERINATAL CARE | Facility: CLINIC | Age: 29
End: 2019-04-22

## 2019-05-01 ENCOUNTER — ROUTINE PRENATAL (OUTPATIENT)
Dept: PERINATAL CARE | Facility: CLINIC | Age: 29
End: 2019-05-01
Payer: COMMERCIAL

## 2019-05-01 VITALS
DIASTOLIC BLOOD PRESSURE: 69 MMHG | SYSTOLIC BLOOD PRESSURE: 101 MMHG | WEIGHT: 183.2 LBS | BODY MASS INDEX: 33.71 KG/M2 | HEART RATE: 84 BPM | HEIGHT: 62 IN

## 2019-05-01 DIAGNOSIS — Z36.86 ENCOUNTER FOR ANTENATAL SCREENING FOR CERVICAL LENGTH: ICD-10-CM

## 2019-05-01 DIAGNOSIS — O99.210 OBESITY AFFECTING PREGNANCY, ANTEPARTUM: ICD-10-CM

## 2019-05-01 DIAGNOSIS — Z3A.20 20 WEEKS GESTATION OF PREGNANCY: ICD-10-CM

## 2019-05-01 DIAGNOSIS — Z36.3 ENCOUNTER FOR ANTENATAL SCREENING FOR MALFORMATIONS: Primary | ICD-10-CM

## 2019-05-01 PROBLEM — Z3A.18 18 WEEKS GESTATION OF PREGNANCY: Status: RESOLVED | Noted: 2019-03-14 | Resolved: 2019-05-01

## 2019-05-01 PROCEDURE — 76811 OB US DETAILED SNGL FETUS: CPT | Performed by: OBSTETRICS & GYNECOLOGY

## 2019-05-01 PROCEDURE — 76817 TRANSVAGINAL US OBSTETRIC: CPT | Performed by: OBSTETRICS & GYNECOLOGY

## 2019-05-01 PROCEDURE — 99213 OFFICE O/P EST LOW 20 MIN: CPT | Performed by: OBSTETRICS & GYNECOLOGY

## 2019-05-28 PROBLEM — O26.892 RH NEGATIVE STATUS IN SINGLETON PREGNANCY IN SECOND TRIMESTER: Status: ACTIVE | Noted: 2019-05-28

## 2019-05-28 PROBLEM — Z67.91 RH NEGATIVE STATUS IN SINGLETON PREGNANCY IN SECOND TRIMESTER: Status: ACTIVE | Noted: 2019-05-28

## 2019-05-29 ENCOUNTER — ROUTINE PRENATAL (OUTPATIENT)
Dept: OBGYN CLINIC | Facility: CLINIC | Age: 29
End: 2019-05-29

## 2019-05-29 VITALS
HEART RATE: 88 BPM | DIASTOLIC BLOOD PRESSURE: 60 MMHG | WEIGHT: 187 LBS | SYSTOLIC BLOOD PRESSURE: 130 MMHG | BODY MASS INDEX: 34.41 KG/M2 | HEIGHT: 62 IN

## 2019-05-29 DIAGNOSIS — O26.892 RH NEGATIVE STATUS IN SINGLETON PREGNANCY IN SECOND TRIMESTER: ICD-10-CM

## 2019-05-29 DIAGNOSIS — Z3A.24 24 WEEKS GESTATION OF PREGNANCY: ICD-10-CM

## 2019-05-29 DIAGNOSIS — K59.00 CONSTIPATION DURING PREGNANCY IN SECOND TRIMESTER: ICD-10-CM

## 2019-05-29 DIAGNOSIS — J45.909 ASTHMA AFFECTING PREGNANCY IN SECOND TRIMESTER: Primary | ICD-10-CM

## 2019-05-29 DIAGNOSIS — O99.512 ASTHMA AFFECTING PREGNANCY IN SECOND TRIMESTER: Primary | ICD-10-CM

## 2019-05-29 DIAGNOSIS — O99.612 CONSTIPATION DURING PREGNANCY IN SECOND TRIMESTER: ICD-10-CM

## 2019-05-29 DIAGNOSIS — Z67.91 RH NEGATIVE STATUS IN SINGLETON PREGNANCY IN SECOND TRIMESTER: ICD-10-CM

## 2019-05-29 DIAGNOSIS — O34.219 MATERNAL CARE FOR SCAR FROM PREVIOUS CESAREAN DELIVERY, UNSPECIFIED PRIOR CESAREAN DELIVERY TYPE: ICD-10-CM

## 2019-05-29 PROBLEM — O21.9 NAUSEA AND VOMITING DURING PREGNANCY: Status: RESOLVED | Noted: 2019-04-15 | Resolved: 2019-05-29

## 2019-05-29 PROBLEM — F41.9 ANXIETY: Status: ACTIVE | Noted: 2018-01-31

## 2019-05-29 PROCEDURE — 99214 OFFICE O/P EST MOD 30 MIN: CPT | Performed by: NURSE PRACTITIONER

## 2019-06-12 ENCOUNTER — ULTRASOUND (OUTPATIENT)
Dept: PERINATAL CARE | Facility: CLINIC | Age: 29
End: 2019-06-12
Payer: COMMERCIAL

## 2019-06-12 VITALS
HEART RATE: 96 BPM | SYSTOLIC BLOOD PRESSURE: 115 MMHG | WEIGHT: 190.8 LBS | BODY MASS INDEX: 35.11 KG/M2 | HEIGHT: 62 IN | DIASTOLIC BLOOD PRESSURE: 75 MMHG

## 2019-06-12 DIAGNOSIS — Z3A.26 26 WEEKS GESTATION OF PREGNANCY: ICD-10-CM

## 2019-06-12 DIAGNOSIS — O26.892 RH NEGATIVE STATUS IN SINGLETON PREGNANCY IN SECOND TRIMESTER: ICD-10-CM

## 2019-06-12 DIAGNOSIS — Z67.91 RH NEGATIVE STATUS IN SINGLETON PREGNANCY IN SECOND TRIMESTER: ICD-10-CM

## 2019-06-12 DIAGNOSIS — O99.512 ASTHMA AFFECTING PREGNANCY IN SECOND TRIMESTER: ICD-10-CM

## 2019-06-12 DIAGNOSIS — R82.71 GBS BACTERIURIA: ICD-10-CM

## 2019-06-12 DIAGNOSIS — O34.219 MATERNAL CARE FOR SCAR FROM PREVIOUS CESAREAN DELIVERY, UNSPECIFIED PRIOR CESAREAN DELIVERY TYPE: Primary | ICD-10-CM

## 2019-06-12 DIAGNOSIS — J45.909 ASTHMA AFFECTING PREGNANCY IN SECOND TRIMESTER: ICD-10-CM

## 2019-06-12 PROCEDURE — 76816 OB US FOLLOW-UP PER FETUS: CPT | Performed by: OBSTETRICS & GYNECOLOGY

## 2019-06-24 PROBLEM — O99.513 ASTHMA COMPLICATING PREGNANCY IN THIRD TRIMESTER: Status: ACTIVE | Noted: 2019-03-18

## 2019-06-24 PROBLEM — Z3A.28 28 WEEKS GESTATION OF PREGNANCY: Status: ACTIVE | Noted: 2019-03-14

## 2019-07-03 ENCOUNTER — APPOINTMENT (OUTPATIENT)
Dept: LAB | Facility: CLINIC | Age: 29
End: 2019-07-03
Payer: COMMERCIAL

## 2019-07-03 ENCOUNTER — TRANSCRIBE ORDERS (OUTPATIENT)
Dept: ADMINISTRATIVE | Facility: HOSPITAL | Age: 29
End: 2019-07-03

## 2019-07-03 DIAGNOSIS — Z67.91 RH NEGATIVE STATUS IN SINGLETON PREGNANCY IN SECOND TRIMESTER: ICD-10-CM

## 2019-07-03 DIAGNOSIS — O26.892 RH NEGATIVE STATUS IN SINGLETON PREGNANCY IN SECOND TRIMESTER: ICD-10-CM

## 2019-07-03 DIAGNOSIS — Z3A.24 24 WEEKS GESTATION OF PREGNANCY: ICD-10-CM

## 2019-07-03 LAB
ABO GROUP BLD: NORMAL
BASOPHILS # BLD AUTO: 0.02 THOUSANDS/ΜL (ref 0–0.1)
BASOPHILS NFR BLD AUTO: 0 % (ref 0–1)
BLD GP AB SCN SERPL QL: NEGATIVE
EOSINOPHIL # BLD AUTO: 0.09 THOUSAND/ΜL (ref 0–0.61)
EOSINOPHIL NFR BLD AUTO: 1 % (ref 0–6)
ERYTHROCYTE [DISTWIDTH] IN BLOOD BY AUTOMATED COUNT: 14.2 % (ref 11.6–15.1)
GLUCOSE 1H P 50 G GLC PO SERPL-MCNC: 140 MG/DL
HCT VFR BLD AUTO: 29.5 % (ref 34.8–46.1)
HGB BLD-MCNC: 9.6 G/DL (ref 11.5–15.4)
IMM GRANULOCYTES # BLD AUTO: 0.11 THOUSAND/UL (ref 0–0.2)
IMM GRANULOCYTES NFR BLD AUTO: 1 % (ref 0–2)
LYMPHOCYTES # BLD AUTO: 1.44 THOUSANDS/ΜL (ref 0.6–4.47)
LYMPHOCYTES NFR BLD AUTO: 14 % (ref 14–44)
MCH RBC QN AUTO: 29.7 PG (ref 26.8–34.3)
MCHC RBC AUTO-ENTMCNC: 32.5 G/DL (ref 31.4–37.4)
MCV RBC AUTO: 91 FL (ref 82–98)
MONOCYTES # BLD AUTO: 0.64 THOUSAND/ΜL (ref 0.17–1.22)
MONOCYTES NFR BLD AUTO: 6 % (ref 4–12)
NEUTROPHILS # BLD AUTO: 8.17 THOUSANDS/ΜL (ref 1.85–7.62)
NEUTS SEG NFR BLD AUTO: 78 % (ref 43–75)
NRBC BLD AUTO-RTO: 0 /100 WBCS
PLATELET # BLD AUTO: 193 THOUSANDS/UL (ref 149–390)
PMV BLD AUTO: 11 FL (ref 8.9–12.7)
RBC # BLD AUTO: 3.23 MILLION/UL (ref 3.81–5.12)
RH BLD: NEGATIVE
SPECIMEN EXPIRATION DATE: NORMAL
WBC # BLD AUTO: 10.47 THOUSAND/UL (ref 4.31–10.16)

## 2019-07-03 PROCEDURE — 36415 COLL VENOUS BLD VENIPUNCTURE: CPT

## 2019-07-03 PROCEDURE — 85025 COMPLETE CBC W/AUTO DIFF WBC: CPT

## 2019-07-03 PROCEDURE — 86592 SYPHILIS TEST NON-TREP QUAL: CPT

## 2019-07-03 PROCEDURE — 86901 BLOOD TYPING SEROLOGIC RH(D): CPT

## 2019-07-03 PROCEDURE — 86850 RBC ANTIBODY SCREEN: CPT

## 2019-07-03 PROCEDURE — 86900 BLOOD TYPING SEROLOGIC ABO: CPT

## 2019-07-03 PROCEDURE — 82950 GLUCOSE TEST: CPT

## 2019-07-05 LAB — RPR SER QL: NORMAL

## 2019-07-08 DIAGNOSIS — O99.013 ANEMIA DURING PREGNANCY IN THIRD TRIMESTER: Primary | ICD-10-CM

## 2019-07-08 DIAGNOSIS — O99.810 ABNORMAL GLUCOSE TOLERANCE TEST (GTT) DURING PREGNANCY, ANTEPARTUM: ICD-10-CM

## 2019-07-08 RX ORDER — FERROUS SULFATE TAB EC 324 MG (65 MG FE EQUIVALENT) 324 (65 FE) MG
324 TABLET DELAYED RESPONSE ORAL
Qty: 60 TABLET | Refills: 1 | Status: SHIPPED | OUTPATIENT
Start: 2019-07-08 | End: 2019-10-01 | Stop reason: ALTCHOICE

## 2019-07-10 ENCOUNTER — ROUTINE PRENATAL (OUTPATIENT)
Dept: OBGYN CLINIC | Facility: CLINIC | Age: 29
End: 2019-07-10

## 2019-07-10 VITALS
HEART RATE: 102 BPM | WEIGHT: 189 LBS | SYSTOLIC BLOOD PRESSURE: 130 MMHG | HEIGHT: 62 IN | DIASTOLIC BLOOD PRESSURE: 74 MMHG | BODY MASS INDEX: 34.78 KG/M2

## 2019-07-10 DIAGNOSIS — Z72.51 HIGH RISK SEXUAL BEHAVIOR, UNSPECIFIED TYPE: ICD-10-CM

## 2019-07-10 DIAGNOSIS — Z34.93 PRENATAL CARE IN THIRD TRIMESTER: ICD-10-CM

## 2019-07-10 DIAGNOSIS — Z29.13 NEED FOR RHOGAM DUE TO RH NEGATIVE MOTHER: ICD-10-CM

## 2019-07-10 DIAGNOSIS — Z23 NEED FOR TDAP VACCINATION: Primary | ICD-10-CM

## 2019-07-10 PROBLEM — O99.012 ANEMIA AFFECTING PREGNANCY IN SECOND TRIMESTER: Status: ACTIVE | Noted: 2019-07-10

## 2019-07-10 PROCEDURE — 90471 IMMUNIZATION ADMIN: CPT | Performed by: OBSTETRICS & GYNECOLOGY

## 2019-07-10 PROCEDURE — 87591 N.GONORRHOEAE DNA AMP PROB: CPT | Performed by: STUDENT IN AN ORGANIZED HEALTH CARE EDUCATION/TRAINING PROGRAM

## 2019-07-10 PROCEDURE — 96372 THER/PROPH/DIAG INJ SC/IM: CPT | Performed by: OBSTETRICS & GYNECOLOGY

## 2019-07-10 PROCEDURE — 90715 TDAP VACCINE 7 YRS/> IM: CPT | Performed by: OBSTETRICS & GYNECOLOGY

## 2019-07-10 PROCEDURE — 99213 OFFICE O/P EST LOW 20 MIN: CPT | Performed by: OBSTETRICS & GYNECOLOGY

## 2019-07-10 PROCEDURE — 87491 CHLMYD TRACH DNA AMP PROBE: CPT | Performed by: STUDENT IN AN ORGANIZED HEALTH CARE EDUCATION/TRAINING PROGRAM

## 2019-07-10 NOTE — PROGRESS NOTES
OB/GYN  PN Visit  Adam Ervin  [id number]  2019  7:12 AM  Dr Shmuel Ayon MD    S: 34 y o  [id number] 30w2d here for PN visit  She has an obstetric complaint of irregular, sporadic abdominal tightening that was painful once but self resolved  She has no complaints of pelvic pain, contractions, vaginal bleeding, loss of fluid, or decreased fetal movement  Her dizziness is resolved since her last visit  She plans to deliver by  section  She is considering her options for her contraceptive method  Requested GC/CT testing on urine sample  She also reports cessation of marijuana use for the last two weeks and is aware she will have UDS testing on arrival to L&D      O:  Vitals:    07/10/19 1350   BP: 130/74   Pulse: 102       Gen: no acute distress, nonlabored breathing   Fundal height 33 cm  CV: RRR  Pulm: CTAB     FHT: 145 bpm    A/P:    IUP @ 30 weeks  F/u 34w growth scan as recommended by  center, patient reports being scheduled already  Received Tdap and Rhogam today  TSH lab slip given for normocytic anemia on 28 week labs and patient is aware of need for 3 hr GTT  Will need repeat CBC with peripheral smear (ordered) in 1 month      Shmuel Ayon MD  2019  7:12 AM

## 2019-07-11 ENCOUNTER — APPOINTMENT (OUTPATIENT)
Dept: LAB | Facility: CLINIC | Age: 29
End: 2019-07-11
Payer: COMMERCIAL

## 2019-07-11 DIAGNOSIS — Z34.93 PRENATAL CARE IN THIRD TRIMESTER: ICD-10-CM

## 2019-07-11 PROBLEM — Z3A.30 30 WEEKS GESTATION OF PREGNANCY: Status: ACTIVE | Noted: 2019-03-14

## 2019-07-11 LAB
BASOPHILS # BLD AUTO: 0.01 THOUSANDS/ΜL (ref 0–0.1)
BASOPHILS # BLD MANUAL: 0 THOUSAND/UL (ref 0–0.1)
BASOPHILS NFR BLD AUTO: 0 % (ref 0–1)
BASOPHILS NFR MAR MANUAL: 0 % (ref 0–1)
C TRACH DNA SPEC QL NAA+PROBE: NEGATIVE
EOSINOPHIL # BLD AUTO: 0.11 THOUSAND/ΜL (ref 0–0.61)
EOSINOPHIL # BLD MANUAL: 0.1 THOUSAND/UL (ref 0–0.4)
EOSINOPHIL NFR BLD AUTO: 1 % (ref 0–6)
EOSINOPHIL NFR BLD MANUAL: 1 % (ref 0–6)
ERYTHROCYTE [DISTWIDTH] IN BLOOD BY AUTOMATED COUNT: 13.7 % (ref 11.6–15.1)
HCT VFR BLD AUTO: 31.3 % (ref 34.8–46.1)
HGB BLD-MCNC: 10 G/DL (ref 11.5–15.4)
IMM GRANULOCYTES # BLD AUTO: 0.11 THOUSAND/UL (ref 0–0.2)
IMM GRANULOCYTES NFR BLD AUTO: 1 % (ref 0–2)
LYMPHOCYTES # BLD AUTO: 1.59 THOUSANDS/ΜL (ref 0.6–4.47)
LYMPHOCYTES # BLD AUTO: 1.66 THOUSAND/UL (ref 0.6–4.47)
LYMPHOCYTES # BLD AUTO: 17 % (ref 14–44)
LYMPHOCYTES NFR BLD AUTO: 16 % (ref 14–44)
MCH RBC QN AUTO: 28.5 PG (ref 26.8–34.3)
MCHC RBC AUTO-ENTMCNC: 31.9 G/DL (ref 31.4–37.4)
MCV RBC AUTO: 89 FL (ref 82–98)
MONOCYTES # BLD AUTO: 0.29 THOUSAND/UL (ref 0–1.22)
MONOCYTES # BLD AUTO: 0.73 THOUSAND/ΜL (ref 0.17–1.22)
MONOCYTES NFR BLD AUTO: 8 % (ref 4–12)
MONOCYTES NFR BLD: 3 % (ref 4–12)
N GONORRHOEA DNA SPEC QL NAA+PROBE: NEGATIVE
NEUTROPHILS # BLD AUTO: 7.19 THOUSANDS/ΜL (ref 1.85–7.62)
NEUTROPHILS # BLD MANUAL: 7.69 THOUSAND/UL (ref 1.85–7.62)
NEUTS SEG NFR BLD AUTO: 74 % (ref 43–75)
NEUTS SEG NFR BLD AUTO: 79 % (ref 43–75)
NRBC BLD AUTO-RTO: 0 /100 WBCS
PLATELET # BLD AUTO: 204 THOUSANDS/UL (ref 149–390)
PLATELET BLD QL SMEAR: ADEQUATE
PMV BLD AUTO: 10.9 FL (ref 8.9–12.7)
RBC # BLD AUTO: 3.51 MILLION/UL (ref 3.81–5.12)
TOTAL CELLS COUNTED SPEC: 100
TSH SERPL DL<=0.05 MIU/L-ACNC: 2.06 UIU/ML (ref 0.36–3.74)
WBC # BLD AUTO: 9.74 THOUSAND/UL (ref 4.31–10.16)

## 2019-07-11 PROCEDURE — 84443 ASSAY THYROID STIM HORMONE: CPT

## 2019-07-11 PROCEDURE — 36415 COLL VENOUS BLD VENIPUNCTURE: CPT

## 2019-07-11 PROCEDURE — 85025 COMPLETE CBC W/AUTO DIFF WBC: CPT

## 2019-07-11 PROCEDURE — 85027 COMPLETE CBC AUTOMATED: CPT

## 2019-07-11 PROCEDURE — 85007 BL SMEAR W/DIFF WBC COUNT: CPT

## 2019-07-15 ENCOUNTER — HOSPITAL ENCOUNTER (EMERGENCY)
Facility: HOSPITAL | Age: 29
Discharge: HOME | End: 2019-07-15
Attending: EMERGENCY MEDICINE
Payer: COMMERCIAL

## 2019-07-15 VITALS
SYSTOLIC BLOOD PRESSURE: 112 MMHG | DIASTOLIC BLOOD PRESSURE: 62 MMHG | TEMPERATURE: 97.7 F | HEART RATE: 86 BPM | OXYGEN SATURATION: 100 % | RESPIRATION RATE: 18 BRPM

## 2019-07-15 DIAGNOSIS — R19.7 NAUSEA VOMITING AND DIARRHEA: Primary | ICD-10-CM

## 2019-07-15 DIAGNOSIS — R11.2 NAUSEA VOMITING AND DIARRHEA: Primary | ICD-10-CM

## 2019-07-15 PROCEDURE — 99283 EMERGENCY DEPT VISIT LOW MDM: CPT | Performed by: EMERGENCY MEDICINE

## 2019-07-15 PROCEDURE — 99282 EMERGENCY DEPT VISIT SF MDM: CPT

## 2019-07-15 NOTE — ED PROVIDER NOTES
History  Chief Complaint   Patient presents with    Vomiting     pt c/o N/V/D and generalized weakness for 1 day, pt is 31 weeks pregnant     80-year-old  at 31 weeks gestational age presenting to emergency department with several days of nausea vomiting diarrhea  Nonbloody brown watery stool, nonbloody nonbilious vomiting  Some mild diffuse abdominal pain  Unable to keep anything down  No fevers or chills  No acute distress  Patient denies contractions, vaginal bleeding, loss of fluid, still has positive fetal movement  Patient follows with OBGYN down at [facility]  Prior to Admission Medications   Prescriptions Last Dose Informant Patient Reported? Taking? Prenatal Vit-Fe Fumarate-FA (PRENATAL VITAMIN PO)  Self Yes No   Sig: Take by mouth   albuterol (2 5 mg/3 mL) 0 083 % nebulizer solution  Self No No   Sig: Take 3 mL by nebulization every 6 (six) hours as needed for wheezing   albuterol (PROVENTIL HFA,VENTOLIN HFA) 90 mcg/act inhaler  Self No No   Sig: Inhale 2 puffs every 4 (four) hours as needed for wheezing   docusate sodium (COLACE) 100 mg capsule  Self No No   Sig: Take 1 capsule (100 mg total) by mouth 2 (two) times a day as needed for constipation   ferrous sulfate 324 (65 Fe) mg   No No   Sig: Take 1 tablet (324 mg total) by mouth 2 (two) times a day before meals   montelukast (SINGULAIR) 10 mg tablet  Self No No   Sig: Take 1 tablet (10 mg total) by mouth daily at bedtime   nystatin-triamcinolone (MYCOLOG-II) ointment  Self No No   Sig: Apply topically 2 (two) times a day      Facility-Administered Medications: None       Past Medical History:   Diagnosis Date    Anemia     Anemia during pregnancy   Anxiety     Asthma     Depression     Postpartum depression   Kidney infection     Kidney stone     Miscarriage     MRSA infection     Rh incompatibility     Urinary tract infection     Varicella     Pt said she had chicken pox twice when she was younger  Past Surgical History:   Procedure Laterality Date    ADENOIDECTOMY       SECTION      DILATION AND CURETTAGE OF UTERUS  2014    TONSILLECTOMY         Family History   Problem Relation Age of Onset    No Known Problems Mother     Heart disease Father     Heart attack Father     Stroke Father     No Known Problems Daughter     No Known Problems Son     Diverticulitis Maternal Grandmother     Diabetes Maternal Grandfather     Aneurysm Maternal Grandfather     No Known Problems Paternal Grandmother     Heart disease Paternal Grandfather     Heart attack Paternal Grandfather     Heart disease Family     Crohn's disease Paternal Aunt      I have reviewed and agree with the history as documented  Social History     Tobacco Use    Smoking status: Former Smoker     Packs/day: 0 00     Types: Cigarettes    Smokeless tobacco: Never Used    Tobacco comment: approx 2 cigarettes   Substance Use Topics    Alcohol use: No    Drug use: Not Currently     Types: Marijuana     Comment: last use approx 2018        Review of Systems   Constitutional: Negative for appetite change, chills, fatigue and fever  HENT: Negative for sneezing and sore throat  Eyes: Negative for visual disturbance  Respiratory: Negative for cough, choking, chest tightness, shortness of breath and wheezing  Cardiovascular: Negative for chest pain and palpitations  Gastrointestinal: Positive for abdominal pain, diarrhea, nausea and vomiting  Negative for constipation  Genitourinary: Negative for difficulty urinating and dysuria  Neurological: Negative for dizziness, weakness, light-headedness, numbness and headaches  All other systems reviewed and are negative  Physical Exam  Physical Exam   Constitutional: She is oriented to person, place, and time  She appears well-developed and well-nourished  No distress  HENT:   Head: Normocephalic and atraumatic     Mouth/Throat: Oropharynx is clear and moist  Eyes: Pupils are equal, round, and reactive to light  EOM are normal    Neck: No JVD present  No tracheal deviation present  Cardiovascular: Normal rate, regular rhythm, normal heart sounds and intact distal pulses  Exam reveals no gallop and no friction rub  No murmur heard  Pulmonary/Chest: Effort normal and breath sounds normal  No respiratory distress  She has no wheezes  She has no rales  Abdominal: Soft  Bowel sounds are normal  She exhibits no distension  There is no tenderness  There is no rebound and no guarding  Gravid, nontender abdomen   Neurological: She is alert and oriented to person, place, and time  No cranial nerve deficit  She exhibits normal muscle tone  Skin: Skin is warm and dry  She is not diaphoretic  No pallor  Psychiatric: She has a normal mood and affect  Her behavior is normal    Nursing note and vitals reviewed  Vital Signs  ED Triage Vitals [07/15/19 1737]   Temperature Pulse Respirations Blood Pressure SpO2   97 7 °F (36 5 °C) 86 18 112/62 100 %      Temp Source Heart Rate Source Patient Position - Orthostatic VS BP Location FiO2 (%)   Oral Monitor Sitting Left arm --      Pain Score       --           Vitals:    07/15/19 1737   BP: 112/62   Pulse: 86   Patient Position - Orthostatic VS: Sitting         Visual Acuity      ED Medications  Medications - No data to display    Diagnostic Studies  Results Reviewed     None                 No orders to display              Procedures  Procedures       ED Course                               MDM  Number of Diagnoses or Management Options  Nausea vomiting and diarrhea:   Diagnosis management comments: 77-year-old female with nausea vomiting diarrhea in pregnancy  Likely related to a viral syndrome however given our inability to perform fetal monitoring here, recommended that rather than further evaluation and treatment here that she present immediately to [address] [place] [address] where there are OBGYN services    Patient understands this  Offered EMS transport , though the patient is preferring to go by car at this time  There is no signs of active labor and so I feel this is appropriate at this time  Report called to emergency department at [facility] and the patient is discharged with plan to present to [facility] ED      Disposition  Final diagnoses:   Nausea vomiting and diarrhea     Time reflects when diagnosis was documented in both MDM as applicable and the Disposition within this note     Time User Action Codes Description Comment    7/15/2019  6:21 PM Terence Hwang Add [R11 2,  R19 7] Nausea vomiting and diarrhea       ED Disposition     ED Disposition Condition Date/Time Comment    Discharge Stable Mon Jul 15, 2019  6:21 PM Ramona Doty discharge to home/self care  Follow-up Information     Follow up With Specialties Details Why Contact Info        Please present ASAP to the emergency department at [facility] in [place]          Patient's Medications   Discharge Prescriptions    No medications on file     No discharge procedures on file      ED Provider  Electronically Signed by           Zainab Walker MD  07/15/19 8339

## 2019-07-24 ENCOUNTER — APPOINTMENT (OUTPATIENT)
Dept: LAB | Facility: HOSPITAL | Age: 29
End: 2019-07-24
Payer: COMMERCIAL

## 2019-07-24 ENCOUNTER — ROUTINE PRENATAL (OUTPATIENT)
Dept: OBGYN CLINIC | Facility: CLINIC | Age: 29
End: 2019-07-24

## 2019-07-24 VITALS
DIASTOLIC BLOOD PRESSURE: 72 MMHG | HEIGHT: 62 IN | WEIGHT: 193 LBS | SYSTOLIC BLOOD PRESSURE: 120 MMHG | BODY MASS INDEX: 35.51 KG/M2 | HEART RATE: 96 BPM

## 2019-07-24 DIAGNOSIS — O99.810 ABNORMAL GLUCOSE TOLERANCE TEST (GTT) DURING PREGNANCY, ANTEPARTUM: ICD-10-CM

## 2019-07-24 DIAGNOSIS — Z3A.32 32 WEEKS GESTATION OF PREGNANCY: Primary | ICD-10-CM

## 2019-07-24 LAB
GLUCOSE 1H P 100 G GLC PO SERPL-MCNC: 147 MG/DL (ref 65–179)
GLUCOSE 2H P 100 G GLC PO SERPL-MCNC: 118 MG/DL (ref 65–154)
GLUCOSE 3H P 100 G GLC PO SERPL-MCNC: 49 MG/DL (ref 65–139)
GLUCOSE P FAST SERPL-MCNC: 83 MG/DL (ref 65–99)

## 2019-07-24 PROCEDURE — 82952 GTT-ADDED SAMPLES: CPT

## 2019-07-24 PROCEDURE — 36415 COLL VENOUS BLD VENIPUNCTURE: CPT

## 2019-07-24 PROCEDURE — 99213 OFFICE O/P EST LOW 20 MIN: CPT | Performed by: OBSTETRICS & GYNECOLOGY

## 2019-07-24 PROCEDURE — 82951 GLUCOSE TOLERANCE TEST (GTT): CPT

## 2019-07-24 NOTE — PROGRESS NOTES
OB/GYN prenatal visit    S: 34 y o  Beauty Jayden here for PN visit  She has no obstetric complaints, including pelvic pain, contractions, vaginal bleeding, loss of fluid, or decreased fetal movement  We discussed  delivery and she was consented  We also discussed sterilization and she was consented  Risks and benefits included inability to complete the procedure, bleeding, hysterectomy, hemorrhage, damage to nearby structures particularly bladder injury, potential need for transfusion, and death      O:  Vitals:    19 1300   BP: 120/72   Pulse: 96       Gen: no acute distress, nonlabored breathing  FHT: 140s  Fundal height: 33 cm          A/P:    Problem List Items Addressed This Visit        Other    32 weeks gestation of pregnancy - Primary        RTO in 2 weeks  F/u Hgp electrophoresis and 3 hour GTT which was completed today prior to visit  MA 31 signed today    Adriana Olmedo MD  2019  8:54 AM

## 2019-08-06 PROBLEM — Z3A.34 34 WEEKS GESTATION OF PREGNANCY: Status: ACTIVE | Noted: 2019-03-14

## 2019-08-06 NOTE — PATIENT INSTRUCTIONS
Kick Counts in Pregnancy   AMBULATORY CARE:   Kick counts  measure how much your baby is moving in your womb  A kick from your baby can be felt as a twist, turn, swish, roll, or jab  It is common to feel your baby kicking at 26 to 28 weeks of pregnancy  You may feel your baby kick as early as 20 weeks of pregnancy  Seek care immediately if:   · You feel your baby kick less as the day goes on      · You do not feel any kicks in a day  Contact your healthcare provider if:   · You feel a change in the number of kicks or movements of your baby  · You feel fewer than 10 kicks within 2 hours after counting twice  · You have questions or concerns about your baby's movements  Why measure kick counts:  Your baby's movement may provide information about your baby's health  He may move less, or not at all, if there are problems  He may move less if he does not have enough room to grow in your uterus (womb)  He may also move less if he is not getting enough oxygen or nutrition from the placenta  Tell your healthcare provider as soon as you feel a change in your baby's movements  Problems that are found earlier are easier to treat  When to measure kick counts:   · Measure kick counts at the same time every day  · Measure kick counts when your baby is awake and most active  Your baby may be most active in the evening  · Measure kick counts after a meal or snack  Your baby may be more active after you eat  Wait 2 hours after you drink liquids that contain caffeine  Caffeine can make your baby more active than usual     · You should not smoke while you are pregnant  Smoking increases the risk of health problems for you and for your baby during your pregnancy  If you do smoke, wait 2 hours to measure kick counts  Nicotine can make your baby more active than usual   How to measure kick counts:  Check that your baby is awake before you measure kick counts   You can wake up your baby by lightly pushing on your belly, walking, or drinking something cold  Your healthcare provider may tell you different ways to measure kick counts  He may tell you to do the following:  · Use a chart or clock to keep track of the time you start and finish counting  · Sit in a chair or lie on your left side  · Place your hands on the largest part of your belly  · Count until you reach 10 kicks  Write down how much time it takes to count 10 kicks  · It may take 30 minutes to 2 hours to count 10 kicks  It should not take more than 2 hours to count 10 kicks  · If you do not feel 10 kicks within 2 hours, wait 1 hour and count again  Your baby can sleep for up to 40 minutes at one time  Follow up with your healthcare provider as directed:  Write down your questions so you remember to ask them during your visits  © 2017 [employer] Information is for End User's use only and may not be sold, redistributed or otherwise used for commercial purposes  All illustrations and images included in CareNotes® are the copyrighted property of [employer]  or [employer]  The above information is an  only  It is not intended as medical advice for individual conditions or treatments  Talk to your doctor, nurse or pharmacist before following any medical regimen to see if it is safe and effective for you  Pregnancy at [address] to [address]:   What changes are happening in my body? You are considered full term at the beginning of 37 weeks  Your breathing may be easier if your baby has moved down into a head-down position  You may need to urinate more often because the baby may be pressing on your bladder  You may also feel more discomfort and get tired easily  How do I care for myself at this stage of my pregnancy? · Eat a variety of healthy foods  Healthy foods include fruits, vegetables, whole-grain breads, low-fat dairy foods, beans, lean meats, and fish   Drink liquids as directed  Ask how much liquid to drink each day and which liquids are best for you  Limit caffeine to less than 200 milligrams each day  Limit your intake of fish to 2 servings each week  Choose fish low in mercury such as canned light tuna, shrimp, salmon, cod, or tilapia  Do not  eat fish high in mercury such as swordfish, tilefish, marshal mackerel, and shark  · Take prenatal vitamins as directed  Your need for certain vitamins and minerals, such as folic acid, increases during pregnancy  Prenatal vitamins provide some of the extra vitamins and minerals you need  Prenatal vitamins may also help to decrease the risk of certain birth defects  · Rest as needed  Put your feet up if you have swelling in your ankles and feet  · Do not smoke  If you smoke, it is never too late to quit  Smoking increases your risk of a miscarriage and other health problems during your pregnancy  Smoking can cause your baby to be born too early or weigh less at birth  Ask your healthcare provider for information if you need help quitting  · Do not drink alcohol  Alcohol passes from your body to your baby through the placenta  It can affect your baby's brain development and cause fetal alcohol syndrome (FAS)  FAS is a group of conditions that causes mental, behavior, and growth problems  · Talk to your healthcare provider before you take any medicines  Many medicines may harm your baby if you take them when you are pregnant  Do not take any medicines, vitamins, herbs, or supplements without first talking to your healthcare provider  Never use illegal or street drugs (such as marijuana or cocaine) while you are pregnant  · Talk to your healthcare provider before you travel  You may not be able to travel in an airplane after 36 weeks  He may also recommend that you avoid long road trips  What are some safety tips during pregnancy? · Avoid hot tubs and saunas    Do not use a hot tub or sauna while you are pregnant, especially during your first trimester  Hot tubs and saunas may raise your baby's temperature and increase the risk of birth defects  · Avoid toxoplasmosis  This is an infection caused by eating raw meat or being around infected cat feces  It can cause birth defects, miscarriages, and other problems  Wash your hands after you touch raw meat  Make sure any meat is well-cooked before you eat it  Avoid raw eggs and unpasteurized milk  Use gloves or ask someone else to clean your cat's litter box while you are pregnant  · Ask your healthcare provider about travel  The most comfortable time to travel is during the second trimester  Ask your healthcare provider if you can travel after 36 weeks  You may not be able to travel in an airplane after 36 weeks  He may also recommend that you avoid long road trips  What changes are happening with my baby? By 38 weeks, your baby may weigh between 6 and 9 pounds  Your baby may be about 14 inches long from the top of the head to the rump (baby's bottom)  Your baby hears well enough to know your voice  As your baby gets larger, you may feel fewer kicks and more stretching and rolling  Your baby may move into a head-down position  Your baby will also rest lower in your abdomen  What do I need to know about prenatal care? Your healthcare provider will check your blood pressure and weight  You may also need the following:  · A urine test  may also be done to check for sugar and protein  These can be signs of gestational diabetes or infection  Protein in your urine may also be a sign of preeclampsia  Preeclampsia is a condition that can develop during week 20 or later of your pregnancy  It causes high blood pressure, and it can cause problems with your kidneys and other organs  · A blood test  may be done to check for anemia (low iron level)  · A Tdap vaccine  may be recommended by your healthcare provider       · A group B strep test  is a test that is done to check for group B strep infection  Group B strep is a type of bacteria that may be found in the vagina or rectum  It can be passed to your baby during delivery if you have it  Your healthcare provider will take swab your vagina or rectum and send the sample to the lab for tests  · Fundal height  is a measurement of your uterus to check your baby's growth  This number is usually the same as the number of weeks that you have been pregnant  Your healthcare provider may also check your baby's position  · Your baby's heart rate  will be checked  When should I seek immediate care? · You develop a severe headache that does not go away  · You have new or increased vision changes, such as blurred or spotted vision  · You have new or increased swelling in your face or hands  · You have vaginal spotting or bleeding  · Your water broke or you feel warm water gushing or trickling from your vagina  When should I contact my healthcare provider? · You have more than 5 contractions in 1 hour  · You notice any changes in your baby's movements  · You have abdominal cramps, pressure, or tightening  · You have a change in vaginal discharge  · You have chills or a fever  · You have vaginal itching, burning, or pain  · You have yellow, green, white, or foul-smelling vaginal discharge  · You have pain or burning when you urinate, less urine than usual, or pink or bloody urine  · You have questions or concerns about your condition or care  CARE AGREEMENT:   You have the right to help plan your care  Learn about your health condition and how it may be treated  Discuss treatment options with your caregivers to decide what care you want to receive  You always have the right to refuse treatment  The above information is an  only  It is not intended as medical advice for individual conditions or treatments   Talk to your doctor, nurse or pharmacist before following any medical regimen to see if it is safe and effective for you  © 2017 [address] [employer] Information is for End User's use only and may not be sold, redistributed or otherwise used for commercial purposes  All illustrations and images included in CareNotes® are the copyrighted property of [employer]  or [employer]

## 2019-08-07 ENCOUNTER — ULTRASOUND (OUTPATIENT)
Dept: PERINATAL CARE | Facility: CLINIC | Age: 29
End: 2019-08-07
Payer: COMMERCIAL

## 2019-08-07 ENCOUNTER — APPOINTMENT (OUTPATIENT)
Dept: LAB | Facility: HOSPITAL | Age: 29
End: 2019-08-07
Payer: COMMERCIAL

## 2019-08-07 ENCOUNTER — ROUTINE PRENATAL (OUTPATIENT)
Dept: OBGYN CLINIC | Facility: CLINIC | Age: 29
End: 2019-08-07

## 2019-08-07 VITALS
DIASTOLIC BLOOD PRESSURE: 87 MMHG | HEIGHT: 62 IN | SYSTOLIC BLOOD PRESSURE: 127 MMHG | WEIGHT: 196 LBS | HEART RATE: 97 BPM | BODY MASS INDEX: 36.07 KG/M2

## 2019-08-07 VITALS
WEIGHT: 195 LBS | SYSTOLIC BLOOD PRESSURE: 124 MMHG | BODY MASS INDEX: 35.88 KG/M2 | HEIGHT: 62 IN | HEART RATE: 83 BPM | DIASTOLIC BLOOD PRESSURE: 80 MMHG

## 2019-08-07 DIAGNOSIS — Z67.91 RH NEGATIVE STATUS IN SINGLETON PREGNANCY IN SECOND TRIMESTER: ICD-10-CM

## 2019-08-07 DIAGNOSIS — O34.219 MATERNAL CARE FOR SCAR FROM PREVIOUS CESAREAN DELIVERY, UNSPECIFIED PRIOR CESAREAN DELIVERY TYPE: ICD-10-CM

## 2019-08-07 DIAGNOSIS — O26.892 RH NEGATIVE STATUS IN SINGLETON PREGNANCY IN SECOND TRIMESTER: ICD-10-CM

## 2019-08-07 DIAGNOSIS — Z3A.34 34 WEEKS GESTATION OF PREGNANCY: ICD-10-CM

## 2019-08-07 DIAGNOSIS — J45.909 ASTHMA COMPLICATING PREGNANCY IN THIRD TRIMESTER: Primary | ICD-10-CM

## 2019-08-07 DIAGNOSIS — R82.71 GBS BACTERIURIA: ICD-10-CM

## 2019-08-07 DIAGNOSIS — Z3A.32 32 WEEKS GESTATION OF PREGNANCY: ICD-10-CM

## 2019-08-07 DIAGNOSIS — O99.210 OBESITY AFFECTING PREGNANCY, ANTEPARTUM: Primary | ICD-10-CM

## 2019-08-07 DIAGNOSIS — O99.513 ASTHMA COMPLICATING PREGNANCY IN THIRD TRIMESTER: Primary | ICD-10-CM

## 2019-08-07 DIAGNOSIS — O99.013 ANEMIA DURING PREGNANCY IN THIRD TRIMESTER: ICD-10-CM

## 2019-08-07 PROCEDURE — 36415 COLL VENOUS BLD VENIPUNCTURE: CPT

## 2019-08-07 PROCEDURE — 83020 HEMOGLOBIN ELECTROPHORESIS: CPT

## 2019-08-07 PROCEDURE — 99214 OFFICE O/P EST MOD 30 MIN: CPT | Performed by: NURSE PRACTITIONER

## 2019-08-07 PROCEDURE — 76816 OB US FOLLOW-UP PER FETUS: CPT | Performed by: OBSTETRICS & GYNECOLOGY

## 2019-08-07 NOTE — PROGRESS NOTES
Denies loss of fluid, vaginal bleeding and abdominal pain  Confirms frequent fetal movement, doing fetal kick counts daily  Tolerating prenatal vitamin and iron well  Has not needed to use inhaler  Patient had hemoglobin electrophoresis drawn this morning-results pending at time of visit   Center ultrasound reviewed-19-vertex presentation, normal appearing fetal growth, anterior placenta, no placenta previa, ROSSANA-WNL and EFW 2757g/ 6 lb 1 oz (73%)  Recommendation to follow up as clinically indicated  Plan:  1  Continue prenatal vitamin daily  2  Continue fetal kick counts daily  3  Repeat  section and permanent female sterilization     - scheduled 19 @ 8 a m      - preoperative showering instructions and soap provided  4  Checking in card provided  5   Common discomforts of pregnancy and precautions reviewed  RTO 2 weeks f/u labs

## 2019-08-07 NOTE — PROGRESS NOTES
The patient was seen today for an ultrasound  Please see ultrasound report (located under Ob Procedures) for additional details  Thank you very much for allowing us to participate in the care of this very nice patient  Should you have any questions, please do not hesitate to contact me  Omar Thomas MD [place]  Attending Physician, [place]

## 2019-08-09 LAB
DEPRECATED HGB OTHER BLD-IMP: 0 %
HGB A MFR BLD: 2 % (ref 1.8–3.2)
HGB A MFR BLD: 98 % (ref 96.4–98.8)
HGB C MFR BLD: 0 %
HGB F MFR BLD: 0 % (ref 0–2)
HGB FRACT BLD-IMP: NORMAL
HGB S BLD QL SOLY: NEGATIVE
HGB S MFR BLD: 0 %

## 2019-08-20 PROBLEM — Z3A.36 36 WEEKS GESTATION OF PREGNANCY: Status: ACTIVE | Noted: 2019-03-14

## 2019-08-20 NOTE — PATIENT INSTRUCTIONS
Group B Streptococcus screen, rapid   GENERAL INFORMATION:  What is this test?  This test rapidly detects the presence of bacteria called group B streptococcus (GBS)  It is used when GBS carrier status (to be infected with this bacteria and not show any symptoms) is suspected in women  What are other names for this test?  · Streptococcus agalactiae latex screen  · Streptococcus Group B latex screen  Why do I need this test?  Laboratory tests may be done for many reasons  Tests are performed for routine health screenings or if a disease or toxicity is suspected  Lab tests may be used to determine if a medical condition is improving or worsening  Lab tests may also be used to measure the success or failure of a medication or treatment plan  Lab tests may be ordered for professional or legal reasons  You may need this test if you have:   · Group B Streptococcus carrier  When and how often should I have this test?  When and how often laboratory tests are done may depend on many factors  The timing of laboratory tests may rely on the results or completion of other tests, procedures, or treatments  Lab tests may be performed immediately in an emergency, or tests may be delayed as a condition is treated or monitored  A test may be suggested or become necessary when certain signs or symptoms appear  Due to changes in the way your body naturally functions through the course of a day, lab tests may need to be performed at a certain time of day  If you have prepared for a test by changing your food or fluid intake, lab tests may be timed in accordance with those changes  Timing of tests may be based on increased and decreased levels of medications, drugs or other substances in the body  The age or gender of the person being tested may affect when and how often a lab test is required  Chronic or progressive conditions may need ongoing monitoring through the use of lab tests   Conditions that worsen and improve may also need frequent monitoring  Certain tests may be repeated to obtain a series of results, or tests may need to be repeated to confirm or disprove results  Timing and frequency of lab tests may vary if they are performed for professional or legal reasons  How should I get ready for the test?  Ask the healthcare worker for information about how to prepare for this test    How is the test done? A sample of vaginal cells and discharge, or anal cells may be collected for this test  Both of these samples may be needed for this test   Vaginal cells/discharge:   A vaginal swab is done to collect a sample from the lower part of your vagina  You will be asked to lie on your back with your legs spread and feet placed on stirrups  A special kind of swab will be inserted into your lower vagina or just near the entrance of the vagina  The swab will be rotated gently and then remain still for a few seconds before it is removed  This is done make sure enough secretions have been collected for the test  The sample is then sent for testing  Anal cells: An anal swab is done to collect a sample from your rear end  You may be asked to lie on your back with your legs spread and feet placed in stirrups  A special kind of swab will be inserted an inch into your rear end  The swab will be rotated gently before it is removed  The sample is then sent for testing  How will the test feel? The amount of discomfort you feel will depend on many factors, including your sensitivity to pain  Communicate how you are feeling with the person doing the test  Inform the person doing the test if you feel that you cannot continue with the test   Vaginal cells/discharge:   During a vaginal swab, you may feel discomfort when the swab moves in your vagina  Anal cells:   During an anal swab, you may feel discomfort when the swab moves in your rear end    What should I do after the test?  There are no special instructions to follow after this test    What are the risks? Vaginal cells/discharge: Ask the healthcare worker to explain any risks of this test to you before it is performed  Anal cells: Ask the healthcare worker to explain any risks of this test to you before it is performed  What are normal results for this test?  Laboratory test results may vary depending on your age, gender, health history, the method used for the test, and many other factors  If your results are different from the results suggested below, this may not mean that you have a disease  Contact your healthcare worker if you have any questions  The following is considered to be a normal result for this test:  What follow up should I do after this test?  Ask your healthcare worker how you will be informed of the test results  You may be asked to call for results, schedule an appointment to discuss results, or notified of results by mail  Follow up care varies depending on many factors related to your test  Sometimes there is no follow up after you have been notified of test results  At other times follow up may be suggested or necessary  Some examples of follow up care include changes to medication or treatment plans, referral to a specialist, more or less frequent monitoring, and additional tests or procedures  Talk with your healthcare worker about any concerns or questions you have regarding follow up care or instructions  Where can I get more information? Related Companies   ? Centers for Disease Control and Prevention (CDC) - [employer] it  ? American Academy [employer] - http://www  aafp org    CARE AGREEMENT:   You have the right to help plan your care  To help with this plan, you must learn about your health condition and how it may be treated  You can then discuss treatment options with your caregivers  Work with them to decide what care may be used to treat you  You always have the right to refuse treatment       © Copyright [employer] 2018  Information is for End User's use only and may not be sold, redistributed or otherwise used for commercial purposes  The above information is an  only  It is not intended as a substitute for medical advice for your individual conditions or treatments  Talk to your doctor, nurse or pharmacist before following any medical regimen to see if it is safe and effective for you  Pregnancy at [address]  [facility]:   What changes are happening in my body? You are considered full term at the beginning of 37 weeks  Your breathing may be easier if your baby has moved down into a head-down position  You may need to urinate more often because the baby may be pressing on your bladder  You may also feel more discomfort and get tired easily  How do I care for myself at this stage of my pregnancy? · Eat a variety of healthy foods  Healthy foods include fruits, vegetables, whole-grain breads, low-fat dairy foods, beans, lean meats, and fish  Drink liquids as directed  Ask how much liquid to drink each day and which liquids are best for you  Limit caffeine to less than 200 milligrams each day  Limit your intake of fish to 2 servings each week  Choose fish low in mercury such as canned light tuna, shrimp, salmon, cod, or tilapia  Do not  eat fish high in mercury such as swordfish, tilefish, marshal mackerel, and shark  · Take prenatal vitamins as directed  Your need for certain vitamins and minerals, such as folic acid, increases during pregnancy  Prenatal vitamins provide some of the extra vitamins and minerals you need  Prenatal vitamins may also help to decrease the risk of certain birth defects  · Rest as needed  Put your feet up if you have swelling in your ankles and feet  · Do not smoke  If you smoke, it is never too late to quit  Smoking increases your risk of a miscarriage and other health problems during your pregnancy  Smoking can cause your baby to be born too early or weigh less at birth   Ask your healthcare provider for information if you need help quitting  · Do not drink alcohol  Alcohol passes from your body to your baby through the placenta  It can affect your baby's brain development and cause fetal alcohol syndrome (FAS)  FAS is a group of conditions that causes mental, behavior, and growth problems  · Talk to your healthcare provider before you take any medicines  Many medicines may harm your baby if you take them when you are pregnant  Do not take any medicines, vitamins, herbs, or supplements without first talking to your healthcare provider  Never use illegal or street drugs (such as marijuana or cocaine) while you are pregnant  · Talk to your healthcare provider before you travel  You may not be able to travel in an airplane after 36 weeks  He may also recommend that you avoid long road trips  What are some safety tips during pregnancy? · Avoid hot tubs and saunas  Do not use a hot tub or sauna while you are pregnant, especially during your first trimester  Hot tubs and saunas may raise your baby's temperature and increase the risk of birth defects  · Avoid toxoplasmosis  This is an infection caused by eating raw meat or being around infected cat feces  It can cause birth defects, miscarriages, and other problems  Wash your hands after you touch raw meat  Make sure any meat is well-cooked before you eat it  Avoid raw eggs and unpasteurized milk  Use gloves or ask someone else to clean your cat's litter box while you are pregnant  · Ask your healthcare provider about travel  The most comfortable time to travel is during the second trimester  Ask your healthcare provider if you can travel after 36 weeks  You may not be able to travel in an airplane after 36 weeks  He may also recommend that you avoid long road trips  What changes are happening with my baby? By 38 weeks, your baby may weigh between 6 and 9 pounds   Your baby may be about 14 inches long from the top of the head to the rump (baby's bottom)  Your baby hears well enough to know your voice  As your baby gets larger, you may feel fewer kicks and more stretching and rolling  Your baby may move into a head-down position  Your baby will also rest lower in your abdomen  What do I need to know about prenatal care? Your healthcare provider will check your blood pressure and weight  You may also need the following:  · A urine test  may also be done to check for sugar and protein  These can be signs of gestational diabetes or infection  Protein in your urine may also be a sign of preeclampsia  Preeclampsia is a condition that can develop during week 20 or later of your pregnancy  It causes high blood pressure, and it can cause problems with your kidneys and other organs  · A blood test  may be done to check for anemia (low iron level)  · A Tdap vaccine  may be recommended by your healthcare provider  · A group B strep test  is a test that is done to check for group B strep infection  Group B strep is a type of bacteria that may be found in the vagina or rectum  It can be passed to your baby during delivery if you have it  Your healthcare provider will take swab your vagina or rectum and send the sample to the lab for tests  · Fundal height  is a measurement of your uterus to check your baby's growth  This number is usually the same as the number of weeks that you have been pregnant  Your healthcare provider may also check your baby's position  · Your baby's heart rate  will be checked  When should I seek immediate care? · You develop a severe headache that does not go away  · You have new or increased vision changes, such as blurred or spotted vision  · You have new or increased swelling in your face or hands  · You have vaginal spotting or bleeding  · Your water broke or you feel warm water gushing or trickling from your vagina  When should I contact my healthcare provider?    · You have more than 5 contractions in 1 hour  · You notice any changes in your baby's movements  · You have abdominal cramps, pressure, or tightening  · You have a change in vaginal discharge  · You have chills or a fever  · You have vaginal itching, burning, or pain  · You have yellow, green, white, or foul-smelling vaginal discharge  · You have pain or burning when you urinate, less urine than usual, or pink or bloody urine  · You have questions or concerns about your condition or care  CARE AGREEMENT:   You have the right to help plan your care  Learn about your health condition and how it may be treated  Discuss treatment options with your caregivers to decide what care you want to receive  You always have the right to refuse treatment  The above information is an  only  It is not intended as medical advice for individual conditions or treatments  Talk to your doctor, nurse or pharmacist before following any medical regimen to see if it is safe and effective for you  © 2017 [address] Information is for End User's use only and may not be sold, redistributed or otherwise used for commercial purposes  All illustrations and images included in CareNotes® are the copyrighted property of [employer]  or [employer]  Kick Counts in Pregnancy   AMBULATORY CARE:   Kick counts  measure how much your baby is moving in your womb  A kick from your baby can be felt as a twist, turn, swish, roll, or jab  It is common to feel your baby kicking at 26 to 28 weeks of pregnancy  You may feel your baby kick as early as 20 weeks of pregnancy  Seek care immediately if:   · You feel your baby kick less as the day goes on      · You do not feel any kicks in a day  Contact your healthcare provider if:   · You feel a change in the number of kicks or movements of your baby  · You feel fewer than 10 kicks within 2 hours after counting twice       · You have questions or concerns about your baby's movements  Why measure kick counts:  Your baby's movement may provide information about your baby's health  He may move less, or not at all, if there are problems  He may move less if he does not have enough room to grow in your uterus (womb)  He may also move less if he is not getting enough oxygen or nutrition from the placenta  Tell your healthcare provider as soon as you feel a change in your baby's movements  Problems that are found earlier are easier to treat  When to measure kick counts:   · Measure kick counts at the same time every day  · Measure kick counts when your baby is awake and most active  Your baby may be most active in the evening  · Measure kick counts after a meal or snack  Your baby may be more active after you eat  Wait 2 hours after you drink liquids that contain caffeine  Caffeine can make your baby more active than usual     · You should not smoke while you are pregnant  Smoking increases the risk of health problems for you and for your baby during your pregnancy  If you do smoke, wait 2 hours to measure kick counts  Nicotine can make your baby more active than usual   How to measure kick counts:  Check that your baby is awake before you measure kick counts  You can wake up your baby by lightly pushing on your belly, walking, or drinking something cold  Your healthcare provider may tell you different ways to measure kick counts  He may tell you to do the following:  · Use a chart or clock to keep track of the time you start and finish counting  · Sit in a chair or lie on your left side  · Place your hands on the largest part of your belly  · Count until you reach 10 kicks  Write down how much time it takes to count 10 kicks  · It may take 30 minutes to 2 hours to count 10 kicks  It should not take more than 2 hours to count 10 kicks  · If you do not feel 10 kicks within 2 hours, wait 1 hour and count again   Your baby can sleep for up to 40 minutes at one time  Follow up with your healthcare provider as directed:  Write down your questions so you remember to ask them during your visits  © 2017 [address] Information is for End User's use only and may not be sold, redistributed or otherwise used for commercial purposes  All illustrations and images included in CareNotes® are the copyrighted property of [employer]  or [employer]gang [employer]  The above information is an  only  It is not intended as medical advice for individual conditions or treatments  Talk to your doctor, nurse or pharmacist before following any medical regimen to see if it is safe and effective for you

## 2019-08-21 ENCOUNTER — ROUTINE PRENATAL (OUTPATIENT)
Dept: OBGYN CLINIC | Facility: CLINIC | Age: 29
End: 2019-08-21

## 2019-08-21 VITALS
DIASTOLIC BLOOD PRESSURE: 84 MMHG | WEIGHT: 201 LBS | BODY MASS INDEX: 36.99 KG/M2 | SYSTOLIC BLOOD PRESSURE: 134 MMHG | HEART RATE: 88 BPM | HEIGHT: 62 IN

## 2019-08-21 DIAGNOSIS — O99.013 ANEMIA DURING PREGNANCY IN THIRD TRIMESTER: Primary | ICD-10-CM

## 2019-08-21 DIAGNOSIS — O99.210 OBESITY AFFECTING PREGNANCY, ANTEPARTUM: ICD-10-CM

## 2019-08-21 DIAGNOSIS — Z3A.36 36 WEEKS GESTATION OF PREGNANCY: ICD-10-CM

## 2019-08-21 DIAGNOSIS — O99.513 ASTHMA COMPLICATING PREGNANCY IN THIRD TRIMESTER: ICD-10-CM

## 2019-08-21 DIAGNOSIS — O34.219 MATERNAL CARE FOR SCAR FROM PREVIOUS CESAREAN DELIVERY, UNSPECIFIED PRIOR CESAREAN DELIVERY TYPE: ICD-10-CM

## 2019-08-21 DIAGNOSIS — J45.909 ASTHMA COMPLICATING PREGNANCY IN THIRD TRIMESTER: ICD-10-CM

## 2019-08-21 PROCEDURE — 99214 OFFICE O/P EST MOD 30 MIN: CPT | Performed by: NURSE PRACTITIONER

## 2019-08-21 PROCEDURE — 87653 STREP B DNA AMP PROBE: CPT | Performed by: NURSE PRACTITIONER

## 2019-08-21 NOTE — PROGRESS NOTES
Denies loss of fluid, vaginal bleeding and abdominal pain  Confirms frequent fetal movement, doing fetal kick counts daily  Tolerating prenatal vitamin and iron well  Used inhaler once last week with good results  Hemoglobin electrophoresis resulted normal hemoglobin  No questions or concerns at today's visit  Plan:  1  Continue prenatal vitamin daily  2  Continue fetal kick counts daily  3  Repeat  section and permanent female sterilization scheduled 19 @ 8am  4  GBS collected-penicillin allergy; rash  5  History of MRSA- showering instructions and chlorhexidine soap provided for decolonization  6  Common discomforts of pregnancy and precautions reviewed  Signs and symptoms of labor reviewed    RTO 1 week f/u GBS results

## 2019-08-23 LAB — GP B STREP DNA SPEC QL NAA+PROBE: NORMAL

## 2019-08-28 ENCOUNTER — ROUTINE PRENATAL (OUTPATIENT)
Dept: OBGYN CLINIC | Facility: CLINIC | Age: 29
End: 2019-08-28

## 2019-08-28 VITALS
BODY MASS INDEX: 36.84 KG/M2 | HEART RATE: 76 BPM | HEIGHT: 62 IN | WEIGHT: 200.2 LBS | DIASTOLIC BLOOD PRESSURE: 89 MMHG | SYSTOLIC BLOOD PRESSURE: 132 MMHG

## 2019-08-28 DIAGNOSIS — O99.513 ASTHMA COMPLICATING PREGNANCY IN THIRD TRIMESTER: Primary | ICD-10-CM

## 2019-08-28 DIAGNOSIS — Z3A.37 37 WEEKS GESTATION OF PREGNANCY: ICD-10-CM

## 2019-08-28 DIAGNOSIS — J45.909 ASTHMA COMPLICATING PREGNANCY IN THIRD TRIMESTER: Primary | ICD-10-CM

## 2019-08-28 DIAGNOSIS — O34.219 MATERNAL CARE FOR SCAR FROM PREVIOUS CESAREAN DELIVERY, UNSPECIFIED PRIOR CESAREAN DELIVERY TYPE: ICD-10-CM

## 2019-08-28 PROCEDURE — 99214 OFFICE O/P EST MOD 30 MIN: CPT | Performed by: NURSE PRACTITIONER

## 2019-08-28 NOTE — PROGRESS NOTES
Denies loss of fluid, vaginal bleeding and regular contractions  Confirms irregular occasional contractions  Can confirms frequent fetal movement, doing fetal kick counts daily  Tolerating prenatal vitamin and iron well  Has not needed to use inhaler in the past week  Desires vaginal exam today  No questions or concerns at today's visit  Tolerated vaginal exam well  Plan:  1  Continue prenatal vitamin daily  2  Continue fetal kick counts daily  3  Repeat  section and permanent female sterilization scheduled for 19 @ 8 am  4  history of MRSA completed chlorhexidine showering for decolonization  5   Common discomforts of pregnancy and precautions reviewed signs and symptoms labor reviewed  RTO 1 week

## 2019-08-28 NOTE — PATIENT INSTRUCTIONS
Kick Counts in Pregnancy   AMBULATORY CARE:   Kick counts  measure how much your baby is moving in your womb  A kick from your baby can be felt as a twist, turn, swish, roll, or jab  It is common to feel your baby kicking at 26 to 28 weeks of pregnancy  You may feel your baby kick as early as 20 weeks of pregnancy  Seek care immediately if:   · You feel your baby kick less as the day goes on      · You do not feel any kicks in a day  Contact your healthcare provider if:   · You feel a change in the number of kicks or movements of your baby  · You feel fewer than 10 kicks within 2 hours after counting twice  · You have questions or concerns about your baby's movements  Why measure kick counts:  Your baby's movement may provide information about your baby's health  He may move less, or not at all, if there are problems  He may move less if he does not have enough room to grow in your uterus (womb)  He may also move less if he is not getting enough oxygen or nutrition from the placenta  Tell your healthcare provider as soon as you feel a change in your baby's movements  Problems that are found earlier are easier to treat  When to measure kick counts:   · Measure kick counts at the same time every day  · Measure kick counts when your baby is awake and most active  Your baby may be most active in the evening  · Measure kick counts after a meal or snack  Your baby may be more active after you eat  Wait 2 hours after you drink liquids that contain caffeine  Caffeine can make your baby more active than usual     · You should not smoke while you are pregnant  Smoking increases the risk of health problems for you and for your baby during your pregnancy  If you do smoke, wait 2 hours to measure kick counts  Nicotine can make your baby more active than usual   How to measure kick counts:  Check that your baby is awake before you measure kick counts   You can wake up your baby by lightly pushing on your belly, walking, or drinking something cold  Your healthcare provider may tell you different ways to measure kick counts  He may tell you to do the following:  · Use a chart or clock to keep track of the time you start and finish counting  · Sit in a chair or lie on your left side  · Place your hands on the largest part of your belly  · Count until you reach 10 kicks  Write down how much time it takes to count 10 kicks  · It may take 30 minutes to 2 hours to count 10 kicks  It should not take more than 2 hours to count 10 kicks  · If you do not feel 10 kicks within 2 hours, wait 1 hour and count again  Your baby can sleep for up to 40 minutes at one time  Follow up with your healthcare provider as directed:  Write down your questions so you remember to ask them during your visits  © 2017 [address] Information is for End User's use only and may not be sold, redistributed or otherwise used for commercial purposes  All illustrations and images included in CareNotes® are the copyrighted property of [employer]  or Wolfgang [employer]  The above information is an  only  It is not intended as medical advice for individual conditions or treatments  Talk to your doctor, nurse or pharmacist before following any medical regimen to see if it is safe and effective for you  Pregnancy at 28 to [address]:   What changes are happening in my body? You are considered full term at the beginning of 37 weeks  Your breathing may be easier if your baby has moved down into a head-down position  You may need to urinate more often because the baby may be pressing on your bladder  You may also feel more discomfort and get tired easily  How do I care for myself at this stage of my pregnancy? · Eat a variety of healthy foods  Healthy foods include fruits, vegetables, whole-grain breads, low-fat dairy foods, beans, lean meats, and fish   Drink liquids as directed  Ask how much liquid to drink each day and which liquids are best for you  Limit caffeine to less than 200 milligrams each day  Limit your intake of fish to 2 servings each week  Choose fish low in mercury such as canned light tuna, shrimp, salmon, cod, or tilapia  Do not  eat fish high in mercury such as swordfish, tilefish, marshal mackerel, and shark  · Take prenatal vitamins as directed  Your need for certain vitamins and minerals, such as folic acid, increases during pregnancy  Prenatal vitamins provide some of the extra vitamins and minerals you need  Prenatal vitamins may also help to decrease the risk of certain birth defects  · Rest as needed  Put your feet up if you have swelling in your ankles and feet  · Do not smoke  If you smoke, it is never too late to quit  Smoking increases your risk of a miscarriage and other health problems during your pregnancy  Smoking can cause your baby to be born too early or weigh less at birth  Ask your healthcare provider for information if you need help quitting  · Do not drink alcohol  Alcohol passes from your body to your baby through the placenta  It can affect your baby's brain development and cause fetal alcohol syndrome (FAS)  FAS is a group of conditions that causes mental, behavior, and growth problems  · Talk to your healthcare provider before you take any medicines  Many medicines may harm your baby if you take them when you are pregnant  Do not take any medicines, vitamins, herbs, or supplements without first talking to your healthcare provider  Never use illegal or street drugs (such as marijuana or cocaine) while you are pregnant  · Talk to your healthcare provider before you travel  You may not be able to travel in an airplane after 36 weeks  He may also recommend that you avoid long road trips  What are some safety tips during pregnancy? · Avoid hot tubs and saunas    Do not use a hot tub or sauna while you are pregnant, especially during your first trimester  Hot tubs and saunas may raise your baby's temperature and increase the risk of birth defects  · Avoid toxoplasmosis  This is an infection caused by eating raw meat or being around infected cat feces  It can cause birth defects, miscarriages, and other problems  Wash your hands after you touch raw meat  Make sure any meat is well-cooked before you eat it  Avoid raw eggs and unpasteurized milk  Use gloves or ask someone else to clean your cat's litter box while you are pregnant  · Ask your healthcare provider about travel  The most comfortable time to travel is during the second trimester  Ask your healthcare provider if you can travel after 36 weeks  You may not be able to travel in an airplane after 36 weeks  He may also recommend that you avoid long road trips  What changes are happening with my baby? By 38 weeks, your baby may weigh between 6 and 9 pounds  Your baby may be about 14 inches long from the top of the head to the rump (baby's bottom)  Your baby hears well enough to know your voice  As your baby gets larger, you may feel fewer kicks and more stretching and rolling  Your baby may move into a head-down position  Your baby will also rest lower in your abdomen  What do I need to know about prenatal care? Your healthcare provider will check your blood pressure and weight  You may also need the following:  · A urine test  may also be done to check for sugar and protein  These can be signs of gestational diabetes or infection  Protein in your urine may also be a sign of preeclampsia  Preeclampsia is a condition that can develop during week 20 or later of your pregnancy  It causes high blood pressure, and it can cause problems with your kidneys and other organs  · A blood test  may be done to check for anemia (low iron level)  · A Tdap vaccine  may be recommended by your healthcare provider       · A group B strep test  is a test that is done to check for group B strep infection  Group B strep is a type of bacteria that may be found in the vagina or rectum  It can be passed to your baby during delivery if you have it  Your healthcare provider will take swab your vagina or rectum and send the sample to the lab for tests  · Fundal height  is a measurement of your uterus to check your baby's growth  This number is usually the same as the number of weeks that you have been pregnant  Your healthcare provider may also check your baby's position  · Your baby's heart rate  will be checked  When should I seek immediate care? · You develop a severe headache that does not go away  · You have new or increased vision changes, such as blurred or spotted vision  · You have new or increased swelling in your face or hands  · You have vaginal spotting or bleeding  · Your water broke or you feel warm water gushing or trickling from your vagina  When should I contact my healthcare provider? · You have more than 5 contractions in 1 hour  · You notice any changes in your baby's movements  · You have abdominal cramps, pressure, or tightening  · You have a change in vaginal discharge  · You have chills or a fever  · You have vaginal itching, burning, or pain  · You have yellow, green, white, or foul-smelling vaginal discharge  · You have pain or burning when you urinate, less urine than usual, or pink or bloody urine  · You have questions or concerns about your condition or care  CARE AGREEMENT:   You have the right to help plan your care  Learn about your health condition and how it may be treated  Discuss treatment options with your caregivers to decide what care you want to receive  You always have the right to refuse treatment  The above information is an  only  It is not intended as medical advice for individual conditions or treatments   Talk to your doctor, nurse or pharmacist before following any medical regimen to see if it is safe and effective for you  © 2017 [address] Information is for End User's use only and may not be sold, redistributed or otherwise used for commercial purposes  All illustrations and images included in CareNotes® are the copyrighted property of [employer] Inc  or Wolfgang Tracy

## 2019-09-02 ENCOUNTER — TELEPHONE (OUTPATIENT)
Dept: OTHER | Facility: OTHER | Age: 29
End: 2019-09-02

## 2019-09-02 ENCOUNTER — HOSPITAL ENCOUNTER (EMERGENCY)
Facility: HOSPITAL | Age: 29
Discharge: HOME | DRG: 540 | End: 2019-09-02
Attending: EMERGENCY MEDICINE
Payer: COMMERCIAL

## 2019-09-02 ENCOUNTER — HOSPITAL ENCOUNTER (OUTPATIENT)
Facility: HOSPITAL | Age: 29
Discharge: HOME | DRG: 540 | End: 2019-09-03
Attending: OBSTETRICS & GYNECOLOGY | Admitting: OBSTETRICS & GYNECOLOGY
Payer: COMMERCIAL

## 2019-09-02 VITALS
TEMPERATURE: 98.4 F | RESPIRATION RATE: 18 BRPM | OXYGEN SATURATION: 100 % | HEIGHT: 62 IN | DIASTOLIC BLOOD PRESSURE: 89 MMHG | WEIGHT: 200 LBS | SYSTOLIC BLOOD PRESSURE: 146 MMHG | HEART RATE: 82 BPM | BODY MASS INDEX: 36.8 KG/M2

## 2019-09-02 DIAGNOSIS — R53.83 FATIGUE: Primary | ICD-10-CM

## 2019-09-02 DIAGNOSIS — O16.9 HYPERTENSION IN PREGNANCY: ICD-10-CM

## 2019-09-02 DIAGNOSIS — O99.013 ANEMIA OF PREGNANCY IN THIRD TRIMESTER: ICD-10-CM

## 2019-09-02 PROBLEM — Z3A.38 38 WEEKS GESTATION OF PREGNANCY: Status: ACTIVE | Noted: 2019-03-14

## 2019-09-02 LAB
ALBUMIN SERPL BCP-MCNC: 2.4 G/DL (ref 3.5–5)
ALP SERPL-CCNC: 109 U/L (ref 46–116)
ALT SERPL W P-5'-P-CCNC: 17 U/L (ref 12–78)
ANION GAP SERPL CALCULATED.3IONS-SCNC: 10 MMOL/L (ref 4–13)
AST SERPL W P-5'-P-CCNC: 17 U/L (ref 5–45)
BASOPHILS # BLD AUTO: 0.03 THOUSANDS/ΜL (ref 0–0.1)
BASOPHILS NFR BLD AUTO: 0 % (ref 0–1)
BILIRUB SERPL-MCNC: 0.2 MG/DL (ref 0.2–1)
BILIRUB UR QL STRIP: NEGATIVE
BUN SERPL-MCNC: 3 MG/DL (ref 5–25)
CALCIUM SERPL-MCNC: 8.8 MG/DL (ref 8.3–10.1)
CHLORIDE SERPL-SCNC: 107 MMOL/L (ref 100–108)
CLARITY UR: CLEAR
CO2 SERPL-SCNC: 20 MMOL/L (ref 21–32)
COLOR UR: YELLOW
CREAT SERPL-MCNC: 0.75 MG/DL (ref 0.6–1.3)
CREAT UR-MCNC: 38.1 MG/DL
EOSINOPHIL # BLD AUTO: 0.05 THOUSAND/ΜL (ref 0–0.61)
EOSINOPHIL NFR BLD AUTO: 1 % (ref 0–6)
ERYTHROCYTE [DISTWIDTH] IN BLOOD BY AUTOMATED COUNT: 13.9 % (ref 11.6–15.1)
GFR SERPL CREATININE-BSD FRML MDRD: 108 ML/MIN/1.73SQ M
GLUCOSE SERPL-MCNC: 94 MG/DL (ref 65–140)
GLUCOSE UR STRIP-MCNC: NEGATIVE MG/DL
HCT VFR BLD AUTO: 29 % (ref 34.8–46.1)
HCT VFR BLD AUTO: 29.5 % (ref 34.8–46.1)
HGB BLD-MCNC: 9.3 G/DL (ref 11.5–15.4)
HGB BLD-MCNC: 9.7 G/DL (ref 11.5–15.4)
HGB UR QL STRIP.AUTO: NEGATIVE
IMM GRANULOCYTES # BLD AUTO: 0.06 THOUSAND/UL (ref 0–0.2)
IMM GRANULOCYTES NFR BLD AUTO: 1 % (ref 0–2)
KETONES UR STRIP-MCNC: NEGATIVE MG/DL
LDH SERPL-CCNC: 218 U/L (ref 81–234)
LEUKOCYTE ESTERASE UR QL STRIP: NEGATIVE
LYMPHOCYTES # BLD AUTO: 1.66 THOUSANDS/ΜL (ref 0.6–4.47)
LYMPHOCYTES NFR BLD AUTO: 17 % (ref 14–44)
MCH RBC QN AUTO: 28.9 PG (ref 26.8–34.3)
MCHC RBC AUTO-ENTMCNC: 32.9 G/DL (ref 31.4–37.4)
MCV RBC AUTO: 88 FL (ref 82–98)
MONOCYTES # BLD AUTO: 0.73 THOUSAND/ΜL (ref 0.17–1.22)
MONOCYTES NFR BLD AUTO: 8 % (ref 4–12)
NEUTROPHILS # BLD AUTO: 7.23 THOUSANDS/ΜL (ref 1.85–7.62)
NEUTS SEG NFR BLD AUTO: 73 % (ref 43–75)
NITRITE UR QL STRIP: NEGATIVE
NRBC BLD AUTO-RTO: 0 /100 WBCS
PH UR STRIP.AUTO: 7.5 [PH]
PLATELET # BLD AUTO: 183 THOUSANDS/UL (ref 149–390)
PMV BLD AUTO: 11.3 FL (ref 8.9–12.7)
POTASSIUM SERPL-SCNC: 2.8 MMOL/L (ref 3.5–5.3)
PROT SERPL-MCNC: 6.5 G/DL (ref 6.4–8.2)
PROT UR STRIP-MCNC: NEGATIVE MG/DL
PROT UR-MCNC: 7 MG/DL
PROT/CREAT UR: 0.18 MG/G{CREAT} (ref 0–0.1)
RBC # BLD AUTO: 3.36 MILLION/UL (ref 3.81–5.12)
SODIUM SERPL-SCNC: 137 MMOL/L (ref 136–145)
SP GR UR STRIP.AUTO: 1 (ref 1–1.03)
UROBILINOGEN UR QL STRIP.AUTO: 0.2 E.U./DL
WBC # BLD AUTO: 9.76 THOUSAND/UL (ref 4.31–10.16)

## 2019-09-02 PROCEDURE — 81003 URINALYSIS AUTO W/O SCOPE: CPT | Performed by: OBSTETRICS & GYNECOLOGY

## 2019-09-02 PROCEDURE — 99284 EMERGENCY DEPT VISIT MOD MDM: CPT | Performed by: EMERGENCY MEDICINE

## 2019-09-02 PROCEDURE — 36415 COLL VENOUS BLD VENIPUNCTURE: CPT | Performed by: EMERGENCY MEDICINE

## 2019-09-02 PROCEDURE — 85018 HEMOGLOBIN: CPT | Performed by: EMERGENCY MEDICINE

## 2019-09-02 PROCEDURE — NC001 PR NO CHARGE: Performed by: OBSTETRICS & GYNECOLOGY

## 2019-09-02 PROCEDURE — 83615 LACTATE (LD) (LDH) ENZYME: CPT | Performed by: OBSTETRICS & GYNECOLOGY

## 2019-09-02 PROCEDURE — 99283 EMERGENCY DEPT VISIT LOW MDM: CPT

## 2019-09-02 PROCEDURE — 85014 HEMATOCRIT: CPT | Performed by: EMERGENCY MEDICINE

## 2019-09-02 PROCEDURE — 82570 ASSAY OF URINE CREATININE: CPT | Performed by: OBSTETRICS & GYNECOLOGY

## 2019-09-02 PROCEDURE — 84156 ASSAY OF PROTEIN URINE: CPT | Performed by: OBSTETRICS & GYNECOLOGY

## 2019-09-02 PROCEDURE — 85025 COMPLETE CBC W/AUTO DIFF WBC: CPT | Performed by: OBSTETRICS & GYNECOLOGY

## 2019-09-02 PROCEDURE — 76815 OB US LIMITED FETUS(S): CPT | Performed by: EMERGENCY MEDICINE

## 2019-09-02 PROCEDURE — 80053 COMPREHEN METABOLIC PANEL: CPT | Performed by: OBSTETRICS & GYNECOLOGY

## 2019-09-02 RX ORDER — POTASSIUM CHLORIDE 20 MEQ/1
40 TABLET, EXTENDED RELEASE ORAL ONCE
Status: COMPLETED | OUTPATIENT
Start: 2019-09-02 | End: 2019-09-02

## 2019-09-02 RX ORDER — DIPHENHYDRAMINE HYDROCHLORIDE 50 MG/ML
25 INJECTION INTRAMUSCULAR; INTRAVENOUS ONCE
Status: COMPLETED | OUTPATIENT
Start: 2019-09-02 | End: 2019-09-02

## 2019-09-02 RX ORDER — CALCIUM CARBONATE 200(500)MG
1000 TABLET,CHEWABLE ORAL EVERY 4 HOURS PRN
Status: DISCONTINUED | OUTPATIENT
Start: 2019-09-02 | End: 2019-09-03 | Stop reason: HOSPADM

## 2019-09-02 RX ORDER — SODIUM CHLORIDE 9 MG/ML
20 INJECTION, SOLUTION INTRAVENOUS ONCE
Status: CANCELLED | OUTPATIENT
Start: 2019-09-03

## 2019-09-02 RX ORDER — CALCIUM CARBONATE 200(500)MG
1000 TABLET,CHEWABLE ORAL EVERY 4 HOURS PRN
Status: CANCELLED | OUTPATIENT
Start: 2019-09-02

## 2019-09-02 RX ORDER — CALCIUM CARBONATE 200(500)MG
500 TABLET,CHEWABLE ORAL ONCE
Status: COMPLETED | OUTPATIENT
Start: 2019-09-02 | End: 2019-09-02

## 2019-09-02 RX ADMIN — DIPHENHYDRAMINE HYDROCHLORIDE 25 MG: 50 INJECTION, SOLUTION INTRAMUSCULAR; INTRAVENOUS at 22:48

## 2019-09-02 RX ADMIN — POTASSIUM CHLORIDE 40 MEQ: 1500 TABLET, EXTENDED RELEASE ORAL at 23:42

## 2019-09-02 RX ADMIN — CALCIUM CARBONATE (ANTACID) CHEW TAB 500 MG 500 MG: 500 CHEW TAB at 22:43

## 2019-09-02 RX ADMIN — IRON SUCROSE 200 MG: 20 INJECTION, SOLUTION INTRAVENOUS at 22:54

## 2019-09-02 NOTE — ED PROVIDER NOTES
History  Chief Complaint   Patient presents with    Fatigue     According to the patient, she had a pale blue color around the patient's lips when she had awoke this morining  Patient reports feeling more fatigued  33 yo  at 38 wks, planned for  one week from today presenting after awakening this morning and noticing a bluish discoloration around her lips which resolved after a few minutes  Has never noticed this before  Has been feeling fatigued throughout her third trimester, but says she was so tired yesterday that she needed to take a nap which she normally does not do  Has felt "dizzy" throughout the pregnancy as well, no recent changes to that or symptoms today  Has had increasing SOB as she progressed through the pregnancy, but not outside of what she would expect  Is taking iron supplements for anemia of pregnancy  Has some occasional abdominal "discomfort" which is not crampy and does not feel like contractions, but no other abdominal pain  Denies vaginal bleeding, discharge, loss of fluids, fevers, chills, nausea, vomiting  Had one episode of diarrhea a few days ago that resolved  Has been slightly hypertensive at prior obstetrics but says they are monitoring her closely  Denies RUQ pain, lightheadedness, LOC, severe headaches  Has significant anxiety which she thinks may make her pressures worse at doctor's visits  ROS is otherwise negative as below  History provided by:  Patient   used: No        Prior to Admission Medications   Prescriptions Last Dose Informant Patient Reported? Taking?    Prenatal Vit-Fe Fumarate-FA (PRENATAL VITAMIN PO)  Self Yes No   Sig: Take by mouth   albuterol (2 5 mg/3 mL) 0 083 % nebulizer solution  Self No No   Sig: Take 3 mL by nebulization every 6 (six) hours as needed for wheezing   albuterol (PROVENTIL HFA,VENTOLIN HFA) 90 mcg/act inhaler  Self No No   Sig: Inhale 2 puffs every 4 (four) hours as needed for wheezing docusate sodium (COLACE) 100 mg capsule  Self No No   Sig: Take 1 capsule (100 mg total) by mouth 2 (two) times a day as needed for constipation   Patient not taking: Reported on 2019   ferrous sulfate 324 (65 Fe) mg  Self No No   Sig: Take 1 tablet (324 mg total) by mouth 2 (two) times a day before meals   montelukast (SINGULAIR) 10 mg tablet  Self No No   Sig: Take 1 tablet (10 mg total) by mouth daily at bedtime   Patient not taking: Reported on 9/3/2019      Facility-Administered Medications: None       Past Medical History:   Diagnosis Date    Anemia     Anemia during pregnancy   Anxiety     Asthma     Depression     Postpartum depression   Kidney infection     Kidney stone     Miscarriage     MRSA infection     Rh incompatibility     Urinary tract infection     Varicella     Pt said she had chicken pox twice when she was younger         Past Surgical History:   Procedure Laterality Date    ADENOIDECTOMY       SECTION      DILATION AND CURETTAGE OF UTERUS      HI  DELIVERY ONLY N/A 9/3/2019    Procedure:  SECTION () REPEAT;  Surgeon: Valentina West DO;  [facility]: [facility] ;  Service: Obstetrics    HI LIGATION,FALLOPIAN TUBE W/ Bilateral 9/3/2019    Procedure: LIGATION/COAGULATION TUBAL;  Surgeon: Valentina West DO;  [facility] ;  Service: Obstetrics    TONSILLECTOMY         Family History   Problem Relation Age of Onset    No Known Problems Mother     Heart disease Father     Heart attack Father     Stroke Father     No Known Problems Daughter     No Known Problems Son     Diverticulitis Maternal Grandmother     Diabetes Maternal Grandfather     Aneurysm Maternal Grandfather     No Known Problems Paternal Grandmother     Heart disease Paternal Grandfather     Heart attack Paternal Grandfather     Heart disease Family     Crohn's disease Paternal Aunt      I have reviewed and agree with the history as documented  Social History     Tobacco Use    Smoking status: Former Smoker     Packs/day: 0 00     Types: Cigarettes    Smokeless tobacco: Never Used    Tobacco comment: approx 2 cigarettes   Substance Use Topics    Alcohol use: No    Drug use: Not Currently     Types: Marijuana     Comment: last use approx 12/2018        Review of Systems   Constitutional: Negative for appetite change, chills, diaphoresis, fatigue and fever  HENT: Negative for congestion, rhinorrhea and sore throat  Eyes: Negative for redness and visual disturbance  Respiratory: Negative for cough, shortness of breath and wheezing  Cardiovascular: Negative for chest pain, palpitations and leg swelling  Gastrointestinal: Positive for diarrhea (resolved)  Negative for abdominal pain, blood in stool, constipation, nausea and vomiting  Genitourinary: Negative for difficulty urinating, dysuria, flank pain, frequency, hematuria, pelvic pain, urgency, vaginal bleeding, vaginal discharge and vaginal pain  Musculoskeletal: Negative for back pain and gait problem  Skin: Positive for color change  Negative for pallor and rash  Neurological: Positive for dizziness  Negative for seizures, syncope, weakness, light-headedness, numbness and headaches  Hematological: Does not bruise/bleed easily  Psychiatric/Behavioral: Negative for confusion  The patient is nervous/anxious  Physical Exam  ED Triage Vitals [09/02/19 1618]   Temperature Pulse Respirations Blood Pressure SpO2   98 4 °F (36 9 °C) 82 18 146/89 100 %      Temp Source Heart Rate Source Patient Position - Orthostatic VS BP Location FiO2 (%)   Oral Monitor Lying Right arm --      Pain Score       No Pain             Orthostatic Vital Signs  Vitals:    09/02/19 1618   BP: 146/89   Pulse: 82   Patient Position - Orthostatic VS: Lying       Physical Exam   Constitutional: She is oriented to person, place, and time  She appears well-developed and well-nourished   No distress  HENT:   Head: Normocephalic and atraumatic  Mouth/Throat: No oropharyngeal exudate  Eyes: Pupils are equal, round, and reactive to light  Conjunctivae are normal    Neck: Normal range of motion  Cardiovascular: Normal rate, regular rhythm and normal heart sounds  No murmur heard  Pulmonary/Chest: Effort normal and breath sounds normal  No respiratory distress  She has no wheezes  She has no rales  Abdominal: Bowel sounds are normal  There is no tenderness  Gravid abdomen with uterine fundus felt just below the xiphoid  No TTP  Genitourinary:   Genitourinary Comments: Fetal movement visualized on US    Musculoskeletal: Normal range of motion  She exhibits no edema  Neurological: She is alert and oriented to person, place, and time  She displays normal reflexes  No cranial nerve deficit or sensory deficit  She exhibits normal muscle tone  Coordination normal    5/5 strength in upper and lower extremities   Skin: Skin is warm and dry  No rash noted  She is not diaphoretic  Psychiatric: She has a normal mood and affect  Nursing note and vitals reviewed        ED Medications  Medications - No data to display    Diagnostic Studies  Results Reviewed     Procedure Component Value Units Date/Time    Hemoglobin and hematocrit, blood [[id number]]  (Abnormal) Collected:  19 1655    Lab Status:  Final result Specimen:  Blood from Arm, Right Updated:  19 170     Hemoglobin 9 3 g/dL      Hematocrit 29 0 %                  No orders to display         Procedures  Procedures        ED Course  ED Course as of Sep 06 1830   Mon Sep 02, 2019   1805                                   MDM  Number of Diagnoses or Management Options  Anemia of pregnancy in third trimester: established and worsening  Fatigue: minor  Hypertension in pregnancy: established and worsening  Diagnosis management comments: 35 yo  at 38 wks presenting for increased fatigue yesterday and concern that her lips were discolored this morning, here has a normal exam with normal oxygen saturation and Hgb 9 3, which is slightly below her last check  Patient already on iron supplementation, denies vaginal bleeding, hemodynamically stable  Will follow-up with her ob to continue monitoring this  In addition, BP elevated today in the ED with no signs or symptoms of PEC  Has been elevated at her prior ob visits, will also f/u in office to determine plan of action  Educated on signs and symptoms of PEC  Patient scheduled for repeat  on Monday  -, fetal movement visualized on US  -Discharged to home in good condition, has f/u with ob/gyn  Educated on return precautions verbally and in written discharge paperwork         Amount and/or Complexity of Data Reviewed  Clinical lab tests: ordered and reviewed  Tests in the radiology section of CPT®: reviewed  Discussion of test results with the performing providers: yes  Decide to obtain previous medical records or to obtain history from someone other than the patient: yes  Review and summarize past medical records: yes  Independent visualization of images, tracings, or specimens: yes    Risk of Complications, Morbidity, and/or Mortality  Presenting problems: minimal  Diagnostic procedures: minimal  Management options: minimal    Patient Progress  Patient progress: improved      Disposition  Final diagnoses:   Fatigue   Anemia of pregnancy in third trimester   Hypertension in pregnancy     Time reflects when diagnosis was documented in both MDM as applicable and the Disposition within this note     Time User Action Codes Description Comment    2019  5:40 PM Brittny Hodge [R53 [id number]] Fatigue     2019  5:41 PM Brittny Hodge [[id number]] Anemia of pregnancy in third trimester     2019  5:41 PM Brittny Hodge [[id number]] Hypertension in pregnancy       ED Disposition     ED Disposition Condition Date/Time Comment    Discharge Good Mon Sep 2, 2019  5:40  [facility] discharge to home/self care  Follow-up Information     Follow up With Specialties Details Why Contact Info Additional Information    Ezequiel Rivas MD Family Medicine Schedule an appointment as soon as possible for a visit  For reevaluation as we discussed  [address] Obstetrics and Gynecology Call in 1 day For reevaluation as we discussed  [address] [id number]  [address] [place] [address], [phone]          Discharge Medication List as of 9/2/2019  6:13 PM      CONTINUE these medications which have NOT CHANGED    Details   albuterol (2 5 mg/3 mL) 0 083 % nebulizer solution Take 3 mL by nebulization every 6 (six) hours as needed for wheezing, Starting Tue 1/9/2018, Print      albuterol (PROVENTIL HFA,VENTOLIN HFA) 90 mcg/act inhaler Inhale 2 puffs every 4 (four) hours as needed for wheezing, Starting Tue 1/9/2018, Print      docusate sodium (COLACE) 100 mg capsule Take 1 capsule (100 mg total) by mouth 2 (two) times a day as needed for constipation, Starting Mon 3/18/2019, Normal      ferrous sulfate 324 (65 Fe) mg Take 1 tablet (324 mg total) by mouth 2 (two) times a day before meals, Starting Mon 7/8/2019, Normal      montelukast (SINGULAIR) 10 mg tablet Take 1 tablet (10 mg total) by mouth daily at bedtime, Starting Sun 9/16/2018, Print      Prenatal Vit-Fe Fumarate-FA (PRENATAL VITAMIN PO) Take by mouth, Historical Med           No discharge procedures on file  ED Provider  Attending physically available and evaluated [address] [place] [address]  I managed the patient along with the ED Attending      Electronically Signed by         Kyle Hernandez MD  09/06/19 9561

## 2019-09-02 NOTE — ED ATTENDING ATTESTATION
Yulissa Lay DO, saw and evaluated the patient  I have discussed the patient with the resident/non-physician practitioner and agree with the resident's/non-physician practitioner's findings, Plan of Care, and MDM as documented in the resident's/non-physician practitioner's note, except where noted  All available labs and Radiology studies were reviewed  I was present for key portions of any procedure(s) performed by the resident/non-physician practitioner and I was immediately available to provide assistance  At this point I agree with the current assessment done in the Emergency Department  I have conducted an independent evaluation of this patient a history and physical is as follows:    66-year-old female, currently 36 week , accompanied by her mother-in-law  The patient awoke about 11:00 a m  This morning and noticed that her lips were blue for about 30 minutes  She touch them, they felt normal, had no other complaints, did not see any blueness anywhere else on the body  No respiratory distress or other complaints  Did not have any exposure to blue foods or blue medications  She researched on the Internet about the possible causes of blue lips and was concerned, so she called her OB,and then was then advised to go to the ED  Her lips became normal color after about 30 minutes, she has no complaints now  She has good fetal movement, has some occasional back pain and cramping which has been constant through pregnancy but no acute complaints  Says that she has been noted to be somewhat hypertensive mildly during her pregnancy, last blood pressure was 140/90 at the doctor's office, and they simply been watching that  Also has a history of anemia in pregnancy, not required blood transfusions, lowest hemoglobin per her report was in the sevens    Last hemoglobin on file is 9 6 on July 3, 2019    General:  Patient is well-appearing  Head:  Atraumatic  Eyes:  Conjunctiva pink  ENT:  Mucous membranes are moist, no cyanosis or pallor, mucous members are moist, no swelling the posterior pharynx, no swelling for the mouth, no trismus, no voice change  Neck:  Supple  Cardiac:  S1-S2, without murmurs  Lungs:  Clear to auscultation bilaterally  Abdomen:  Soft, nontender, normal bowel sounds, gravid uterus no CVA tenderness, no tympany, no rigidity, no guarding  Extremities:  Normal range of motion  Neurologic:  Awake, fluent speech, normal comprehension  AAOx3  Skin:  Pink warm and dry, no rash, no other cyanosis  Psychiatric:  Alert, pleasant, cooperative      Labs Reviewed   HEMOGLOBIN AND HEMATOCRIT, BLOOD - Abnormal       Result Value Ref Range Status    Hemoglobin 9 3 (*) 11 5 - 15 4 g/dL Final    Hematocrit 29 0 (*) 34 8 - 46 1 % Final       Patient is well-appearing, no signs of cyanosis, current anemia is not significantly changed from prior          Critical Care Time  Procedures

## 2019-09-02 NOTE — TELEPHONE ENCOUNTER
Kee Cook 1990  CONFIDENTIALTY NOTICE: This fax transmission is intended only for the addressee  It contains information that is legally privileged,  confidential or otherwise protected from use or disclosure  If you are not the intended recipient, you are strictly prohibited from reviewing,  disclosing, copying using or disseminating any of this information or taking any action in reliance on or regarding this information  If you have  received this fax in error, please notify us immediately by telephone so that we can arrange for its return to us  Page:   Call Id: [id number]  Health Call  Standard Call Report  Health Call  Patient Name: Kee Cook  Gender: Female  : 1990  Age: 34 Y 10 M 23 D  Return Phone  Number: [phone] (Home)  Address: [address] [place] [address]/[facility]/Zip: [address] [place] [address]  Practice Name: [facility]  Practice Charged:  Physician:  [facility] Name:  Relationship To  Patient: Self  Return Phone Number: [phone] (Home)  Presenting Problem: "I am 38 weeks pregnant and I am a  little concerned since I am a little SOB  and my lips seem a bit blue in color "  Service Type: Triage  Charged Service 1: Gemini Whitaker U  38  Name and  Number:  Nurse Assessment  Nurse: Maxim Almanzar RN, Miek Taylor Date/Time: 2019 1:35:04 PM  Type of assessment required:  ---General (Adult or Child)  Duration of Current S/S  ---Today for the last hour  Location/Radiation  ---n/a  Temperature (F) and route:  ---Denies fever  Symptom Specific Meds (Dose/Time):  ---None  Other S/S  ---Feeling a bit of SOB for the last hour  Able to complete sentences with out difficulty  This is the first time this has happened since pregnancy  Lips appear to have a bluish  tint to them  Is 38 weeks pregnant   Stated "I felt the baby move earlier but doesn't seem  to be moving as much as she normally does "  Pain Scale on scale of 1-10, 10 being the worst:  ---no pain  Symptom progression:  ---maribel Mari 1990  CONFIDENTIALTY NOTICE: This fax transmission is intended only for the addressee  It contains information that is legally privileged,  confidential or otherwise protected from use or disclosure  If you are not the intended recipient, you are strictly prohibited from reviewing,  disclosing, copying using or disseminating any of this information or taking any action in reliance on or regarding this information  If you have  received this fax in error, please notify us immediately by telephone so that we can arrange for its return to us  Page: 2 of 2  Call Id: [id number]  Nurse Assessment  Intake and Output  ---WNL  LMP/ Pregnancy:  ---38 wks pregnant  Breastfeeding  ---No  Last Exam/Treatment:  ---8/28/2019 for routine visit  Protocols  Protocol Title Nurse Date/Time  Breathing Difficulty Jolene Ramirez RN, Reba Fearing 9/2/2019 1:44:12 PM  Question Caller Affirmed  Disp  Time Disposition Final User  9/2/2019 1:47:36 PM Go to ED Now (or PCP triage) Jolene Ramirez RN, Reba Fearing  9/2/2019 1:47:57 PM RN Triaged Yes Jolene Ramirez RN, [address] Advice Given Per Protocol  GO TO ED NOW (OR PCP TRIAGE): * IF NO PCP TRIAGE: You need to be seen  Go to the [facility] at _____[facility] within the next hour  Leave as soon as you can  DRIVING: Another adult should drive    Caller Understands: Yes  Caller Disagree/Comply: Comply  PreDisposition: Unsure

## 2019-09-02 NOTE — DISCHARGE INSTRUCTIONS
Today you were seen for concern that your lips were blue at home while you are pregnant  Your hemoglobin today was 9 3 which is in line with your prior hemoglobins, please follow up with your ob/gyn for this  I would also like you to call your ob/gyn tomorrow and let them know that your blood pressure was elevated to 146/89 here today, they will likely want you to come in sooner for a blood pressure check  Please call your ob/gyn or come back to the ER if you experience headache, confusion, chest pain, severe abdominal pain, severe shortness of breath, numbness, weakness, tingling, or any other symptoms that are new or concerning to you  Your baby was seen today on ultrasound and everything appeared normal  There was a normal fetal heart rate and normal movement  Please take care to rest during the rest of your pregnancy as much as possible and avoid stressful situations

## 2019-09-03 ENCOUNTER — TELEPHONE (OUTPATIENT)
Dept: OBGYN CLINIC | Facility: CLINIC | Age: 29
End: 2019-09-03

## 2019-09-03 ENCOUNTER — ROUTINE PRENATAL (OUTPATIENT)
Dept: OBGYN CLINIC | Facility: CLINIC | Age: 29
End: 2019-09-03

## 2019-09-03 ENCOUNTER — ANESTHESIA (INPATIENT)
Dept: LABOR AND DELIVERY | Facility: HOSPITAL | Age: 29
DRG: 540 | End: 2019-09-03
Payer: COMMERCIAL

## 2019-09-03 ENCOUNTER — HOSPITAL ENCOUNTER (INPATIENT)
Facility: HOSPITAL | Age: 29
LOS: 3 days | Discharge: HOME | DRG: 540 | End: 2019-09-06
Attending: OBSTETRICS & GYNECOLOGY | Admitting: OBSTETRICS & GYNECOLOGY
Payer: COMMERCIAL

## 2019-09-03 VITALS
WEIGHT: 200 LBS | HEART RATE: 72 BPM | RESPIRATION RATE: 18 BRPM | TEMPERATURE: 97.7 F | DIASTOLIC BLOOD PRESSURE: 70 MMHG | BODY MASS INDEX: 36.8 KG/M2 | HEIGHT: 62 IN | OXYGEN SATURATION: 99 % | SYSTOLIC BLOOD PRESSURE: 118 MMHG

## 2019-09-03 VITALS
HEIGHT: 62 IN | SYSTOLIC BLOOD PRESSURE: 129 MMHG | DIASTOLIC BLOOD PRESSURE: 92 MMHG | BODY MASS INDEX: 36.8 KG/M2 | WEIGHT: 200 LBS | HEART RATE: 106 BPM

## 2019-09-03 DIAGNOSIS — Z98.891 S/P REPEAT LOW TRANSVERSE C-SECTION: ICD-10-CM

## 2019-09-03 DIAGNOSIS — O34.219 PREVIOUS CESAREAN SECTION COMPLICATING PREGNANCY: ICD-10-CM

## 2019-09-03 DIAGNOSIS — O13.3 GESTATIONAL HYPERTENSION, THIRD TRIMESTER: Primary | ICD-10-CM

## 2019-09-03 DIAGNOSIS — Z3A.38 38 WEEKS GESTATION OF PREGNANCY: Primary | ICD-10-CM

## 2019-09-03 PROBLEM — Z90.79 STATUS POST BILATERAL SALPINGECTOMY: Status: ACTIVE | Noted: 2019-09-03

## 2019-09-03 LAB
ABO GROUP BLD: NORMAL
ALBUMIN SERPL BCP-MCNC: 3 G/DL (ref 3.5–5)
ALP SERPL-CCNC: 120 U/L (ref 46–116)
ALT SERPL W P-5'-P-CCNC: 18 U/L (ref 12–78)
AMPHETAMINES SERPL QL SCN: NEGATIVE
ANION GAP SERPL CALCULATED.3IONS-SCNC: 11 MMOL/L (ref 4–13)
AST SERPL W P-5'-P-CCNC: 15 U/L (ref 5–45)
BARBITURATES UR QL: NEGATIVE
BASE EXCESS BLDCOV CALC-SCNC: -4 MMOL/L (ref 1–9)
BENZODIAZ UR QL: NEGATIVE
BILIRUB SERPL-MCNC: 0.26 MG/DL (ref 0.2–1)
BILIRUB UR QL STRIP: NEGATIVE
BLD GP AB SCN SERPL QL: POSITIVE
BLOOD GROUP ANTIBODIES SERPL: NORMAL
BUN SERPL-MCNC: 4 MG/DL (ref 5–25)
CALCIUM SERPL-MCNC: 9.4 MG/DL (ref 8.3–10.1)
CHLORIDE SERPL-SCNC: 110 MMOL/L (ref 100–108)
CLARITY UR: CLEAR
CO2 SERPL-SCNC: 21 MMOL/L (ref 21–32)
COCAINE UR QL: NEGATIVE
COLOR UR: YELLOW
CREAT SERPL-MCNC: 0.64 MG/DL (ref 0.6–1.3)
CREAT UR-MCNC: 46.3 MG/DL
ERYTHROCYTE [DISTWIDTH] IN BLOOD BY AUTOMATED COUNT: 13.9 % (ref 11.6–15.1)
GFR SERPL CREATININE-BSD FRML MDRD: 121 ML/MIN/1.73SQ M
GLUCOSE SERPL-MCNC: 68 MG/DL (ref 65–140)
GLUCOSE UR STRIP-MCNC: NEGATIVE MG/DL
HCO3 BLDCOV-SCNC: 21 MMOL/L (ref 12.2–28.6)
HCT VFR BLD AUTO: 29.4 % (ref 34.8–46.1)
HGB BLD-MCNC: 9.6 G/DL (ref 11.5–15.4)
HGB UR QL STRIP.AUTO: NEGATIVE
HOLD SPECIMEN: NORMAL
KETONES UR STRIP-MCNC: NEGATIVE MG/DL
LEUKOCYTE ESTERASE UR QL STRIP: NEGATIVE
MCH RBC QN AUTO: 27.5 PG (ref 26.8–34.3)
MCHC RBC AUTO-ENTMCNC: 32.7 G/DL (ref 31.4–37.4)
MCV RBC AUTO: 84 FL (ref 82–98)
METHADONE UR QL: NEGATIVE
NITRITE UR QL STRIP: NEGATIVE
OPIATES UR QL SCN: POSITIVE
OXYHGB MFR BLDCOV: 70.5 %
PCO2 BLDCOV: 38.2 MM HG (ref 27–43)
PCP UR QL: NEGATIVE
PH BLDCOV: 7.36 [PH] (ref 7.19–7.49)
PH UR STRIP.AUTO: 7.5 [PH]
PLATELET # BLD AUTO: 192 THOUSANDS/UL (ref 149–390)
PMV BLD AUTO: 10.8 FL (ref 8.9–12.7)
PO2 BLDCOV: 28.7 MM HG (ref 15–45)
POTASSIUM SERPL-SCNC: 3.6 MMOL/L (ref 3.5–5.3)
PROT SERPL-MCNC: 6.5 G/DL (ref 6.4–8.2)
PROT UR STRIP-MCNC: NEGATIVE MG/DL
PROT UR-MCNC: 9 MG/DL
PROT/CREAT UR: 0.19 MG/G{CREAT} (ref 0–0.1)
RBC # BLD AUTO: 3.49 MILLION/UL (ref 3.81–5.12)
RH BLD: NEGATIVE
SAO2 % BLDCOV: 16.5 ML/DL
SODIUM SERPL-SCNC: 142 MMOL/L (ref 136–145)
SP GR UR STRIP.AUTO: 1 (ref 1–1.03)
SPECIMEN EXPIRATION DATE: NORMAL
THC UR QL: NEGATIVE
UROBILINOGEN UR QL STRIP.AUTO: 0.2 E.U./DL
WBC # BLD AUTO: 9.21 THOUSAND/UL (ref 4.31–10.16)

## 2019-09-03 PROCEDURE — 81003 URINALYSIS AUTO W/O SCOPE: CPT | Performed by: OBSTETRICS & GYNECOLOGY

## 2019-09-03 PROCEDURE — 86850 RBC ANTIBODY SCREEN: CPT | Performed by: OBSTETRICS & GYNECOLOGY

## 2019-09-03 PROCEDURE — 86870 RBC ANTIBODY IDENTIFICATION: CPT | Performed by: OBSTETRICS & GYNECOLOGY

## 2019-09-03 PROCEDURE — 59514 CESAREAN DELIVERY ONLY: CPT | Performed by: OBSTETRICS & GYNECOLOGY

## 2019-09-03 PROCEDURE — 82570 ASSAY OF URINE CREATININE: CPT | Performed by: OBSTETRICS & GYNECOLOGY

## 2019-09-03 PROCEDURE — 99213 OFFICE O/P EST LOW 20 MIN: CPT

## 2019-09-03 PROCEDURE — 88302 TISSUE EXAM BY PATHOLOGIST: CPT | Performed by: PATHOLOGY

## 2019-09-03 PROCEDURE — 86901 BLOOD TYPING SEROLOGIC RH(D): CPT | Performed by: OBSTETRICS & GYNECOLOGY

## 2019-09-03 PROCEDURE — 58611 LIGATE OVIDUCT(S) ADD-ON: CPT | Performed by: OBSTETRICS & GYNECOLOGY

## 2019-09-03 PROCEDURE — 96374 THER/PROPH/DIAG INJ IV PUSH: CPT

## 2019-09-03 PROCEDURE — 99024 POSTOP FOLLOW-UP VISIT: CPT | Performed by: OBSTETRICS & GYNECOLOGY

## 2019-09-03 PROCEDURE — 80307 DRUG TEST PRSMV CHEM ANLYZR: CPT | Performed by: OBSTETRICS & GYNECOLOGY

## 2019-09-03 PROCEDURE — 96365 THER/PROPH/DIAG IV INF INIT: CPT

## 2019-09-03 PROCEDURE — 85027 COMPLETE CBC AUTOMATED: CPT | Performed by: OBSTETRICS & GYNECOLOGY

## 2019-09-03 PROCEDURE — 86592 SYPHILIS TEST NON-TREP QUAL: CPT | Performed by: OBSTETRICS & GYNECOLOGY

## 2019-09-03 PROCEDURE — 80053 COMPREHEN METABOLIC PANEL: CPT | Performed by: OBSTETRICS & GYNECOLOGY

## 2019-09-03 PROCEDURE — 99213 OFFICE O/P EST LOW 20 MIN: CPT | Performed by: OBSTETRICS & GYNECOLOGY

## 2019-09-03 PROCEDURE — 82805 BLOOD GASES W/O2 SATURATION: CPT | Performed by: OBSTETRICS & GYNECOLOGY

## 2019-09-03 PROCEDURE — 86900 BLOOD TYPING SEROLOGIC ABO: CPT | Performed by: OBSTETRICS & GYNECOLOGY

## 2019-09-03 PROCEDURE — 84156 ASSAY OF PROTEIN URINE: CPT | Performed by: OBSTETRICS & GYNECOLOGY

## 2019-09-03 PROCEDURE — 99214 OFFICE O/P EST MOD 30 MIN: CPT

## 2019-09-03 PROCEDURE — 0UB70ZZ EXCISION OF BILATERAL FALLOPIAN TUBES, OPEN APPROACH: ICD-10-PCS | Performed by: OBSTETRICS & GYNECOLOGY

## 2019-09-03 RX ORDER — SODIUM CHLORIDE, SODIUM LACTATE, POTASSIUM CHLORIDE, CALCIUM CHLORIDE 600; 310; 30; 20 MG/100ML; MG/100ML; MG/100ML; MG/100ML
INJECTION, SOLUTION INTRAVENOUS CONTINUOUS PRN
Status: DISCONTINUED | OUTPATIENT
Start: 2019-09-03 | End: 2019-09-03 | Stop reason: SURG

## 2019-09-03 RX ORDER — DIPHENHYDRAMINE HYDROCHLORIDE 50 MG/ML
25 INJECTION INTRAMUSCULAR; INTRAVENOUS EVERY 6 HOURS PRN
Status: DISPENSED | OUTPATIENT
Start: 2019-09-03 | End: 2019-09-04

## 2019-09-03 RX ORDER — OXYCODONE HYDROCHLORIDE AND ACETAMINOPHEN 5; 325 MG/1; MG/1
1 TABLET ORAL EVERY 4 HOURS PRN
Status: DISCONTINUED | OUTPATIENT
Start: 2019-09-04 | End: 2019-09-06 | Stop reason: HOSPADM

## 2019-09-03 RX ORDER — IBUPROFEN 600 MG/1
600 TABLET ORAL EVERY 6 HOURS PRN
Status: DISCONTINUED | OUTPATIENT
Start: 2019-09-04 | End: 2019-09-06 | Stop reason: HOSPADM

## 2019-09-03 RX ORDER — METOCLOPRAMIDE HYDROCHLORIDE 5 MG/ML
5 INJECTION INTRAMUSCULAR; INTRAVENOUS EVERY 6 HOURS PRN
Status: ACTIVE | OUTPATIENT
Start: 2019-09-03 | End: 2019-09-04

## 2019-09-03 RX ORDER — DEXAMETHASONE SODIUM PHOSPHATE 10 MG/ML
8 INJECTION, SOLUTION INTRAMUSCULAR; INTRAVENOUS ONCE AS NEEDED
Status: DISPENSED | OUTPATIENT
Start: 2019-09-03 | End: 2019-09-04

## 2019-09-03 RX ORDER — CEFAZOLIN SODIUM 2 G/50ML
SOLUTION INTRAVENOUS AS NEEDED
Status: DISCONTINUED | OUTPATIENT
Start: 2019-09-03 | End: 2019-09-03 | Stop reason: SURG

## 2019-09-03 RX ORDER — ALBUTEROL SULFATE 2.5 MG/3ML
2.5 SOLUTION RESPIRATORY (INHALATION) EVERY 6 HOURS PRN
Status: DISCONTINUED | OUTPATIENT
Start: 2019-09-03 | End: 2019-09-06 | Stop reason: HOSPADM

## 2019-09-03 RX ORDER — DEXAMETHASONE SODIUM PHOSPHATE 10 MG/ML
INJECTION, SOLUTION INTRAMUSCULAR; INTRAVENOUS AS NEEDED
Status: DISCONTINUED | OUTPATIENT
Start: 2019-09-03 | End: 2019-09-03 | Stop reason: SURG

## 2019-09-03 RX ORDER — ONDANSETRON 2 MG/ML
4 INJECTION INTRAMUSCULAR; INTRAVENOUS EVERY 4 HOURS PRN
Status: ACTIVE | OUTPATIENT
Start: 2019-09-03 | End: 2019-09-04

## 2019-09-03 RX ORDER — ONDANSETRON 2 MG/ML
INJECTION INTRAMUSCULAR; INTRAVENOUS AS NEEDED
Status: DISCONTINUED | OUTPATIENT
Start: 2019-09-03 | End: 2019-09-03 | Stop reason: SURG

## 2019-09-03 RX ORDER — METOCLOPRAMIDE HYDROCHLORIDE 5 MG/ML
INJECTION INTRAMUSCULAR; INTRAVENOUS AS NEEDED
Status: DISCONTINUED | OUTPATIENT
Start: 2019-09-03 | End: 2019-09-03 | Stop reason: SURG

## 2019-09-03 RX ORDER — FENTANYL CITRATE 50 UG/ML
INJECTION, SOLUTION INTRAMUSCULAR; INTRAVENOUS AS NEEDED
Status: DISCONTINUED | OUTPATIENT
Start: 2019-09-03 | End: 2019-09-03 | Stop reason: SURG

## 2019-09-03 RX ORDER — MORPHINE SULFATE 0.5 MG/ML
INJECTION, SOLUTION EPIDURAL; INTRATHECAL; INTRAVENOUS AS NEEDED
Status: DISCONTINUED | OUTPATIENT
Start: 2019-09-03 | End: 2019-09-03 | Stop reason: SURG

## 2019-09-03 RX ORDER — SODIUM CHLORIDE, SODIUM LACTATE, POTASSIUM CHLORIDE, CALCIUM CHLORIDE 600; 310; 30; 20 MG/100ML; MG/100ML; MG/100ML; MG/100ML
125 INJECTION, SOLUTION INTRAVENOUS CONTINUOUS
Status: DISPENSED | OUTPATIENT
Start: 2019-09-03 | End: 2019-09-03

## 2019-09-03 RX ORDER — SIMETHICONE 80 MG
80 TABLET,CHEWABLE ORAL EVERY 6 HOURS PRN
Status: DISCONTINUED | OUTPATIENT
Start: 2019-09-03 | End: 2019-09-06 | Stop reason: HOSPADM

## 2019-09-03 RX ORDER — DIPHENHYDRAMINE HCL 25 MG
25 TABLET ORAL EVERY 6 HOURS PRN
Status: DISCONTINUED | OUTPATIENT
Start: 2019-09-04 | End: 2019-09-06 | Stop reason: HOSPADM

## 2019-09-03 RX ORDER — ACETAMINOPHEN 325 MG/1
650 TABLET ORAL EVERY 6 HOURS PRN
Status: DISCONTINUED | OUTPATIENT
Start: 2019-09-03 | End: 2019-09-06 | Stop reason: HOSPADM

## 2019-09-03 RX ORDER — ONDANSETRON 2 MG/ML
4 INJECTION INTRAMUSCULAR; INTRAVENOUS EVERY 8 HOURS PRN
Status: DISCONTINUED | OUTPATIENT
Start: 2019-09-03 | End: 2019-09-03

## 2019-09-03 RX ORDER — OXYTOCIN/RINGER'S LACTATE 30/500 ML
62.5 PLASTIC BAG, INJECTION (ML) INTRAVENOUS CONTINUOUS
Status: DISPENSED | OUTPATIENT
Start: 2019-09-03 | End: 2019-09-04

## 2019-09-03 RX ORDER — NALBUPHINE HCL 10 MG/ML
5 AMPUL (ML) INJECTION
Status: DISPENSED | OUTPATIENT
Start: 2019-09-03 | End: 2019-09-04

## 2019-09-03 RX ORDER — DOCUSATE SODIUM 100 MG/1
100 CAPSULE, LIQUID FILLED ORAL 2 TIMES DAILY
Status: DISCONTINUED | OUTPATIENT
Start: 2019-09-03 | End: 2019-09-06 | Stop reason: HOSPADM

## 2019-09-03 RX ORDER — CEFAZOLIN SODIUM 2 G/50ML
2000 SOLUTION INTRAVENOUS ONCE
Status: DISCONTINUED | OUTPATIENT
Start: 2019-09-03 | End: 2019-09-06 | Stop reason: HOSPADM

## 2019-09-03 RX ORDER — FERROUS SULFATE 325(65) MG
325 TABLET ORAL
Status: DISCONTINUED | OUTPATIENT
Start: 2019-09-04 | End: 2019-09-06 | Stop reason: HOSPADM

## 2019-09-03 RX ORDER — OXYCODONE HYDROCHLORIDE AND ACETAMINOPHEN 5; 325 MG/1; MG/1
2 TABLET ORAL EVERY 4 HOURS PRN
Status: DISCONTINUED | OUTPATIENT
Start: 2019-09-04 | End: 2019-09-06 | Stop reason: HOSPADM

## 2019-09-03 RX ORDER — KETOROLAC TROMETHAMINE 30 MG/ML
30 INJECTION, SOLUTION INTRAMUSCULAR; INTRAVENOUS EVERY 6 HOURS SCHEDULED
Status: COMPLETED | OUTPATIENT
Start: 2019-09-03 | End: 2019-09-04

## 2019-09-03 RX ORDER — HYDROMORPHONE HCL/PF 1 MG/ML
0.5 SYRINGE (ML) INJECTION EVERY 2 HOUR PRN
Status: DISCONTINUED | OUTPATIENT
Start: 2019-09-03 | End: 2019-09-06 | Stop reason: HOSPADM

## 2019-09-03 RX ORDER — MONTELUKAST SODIUM 10 MG/1
10 TABLET ORAL
Status: DISCONTINUED | OUTPATIENT
Start: 2019-09-03 | End: 2019-09-06 | Stop reason: HOSPADM

## 2019-09-03 RX ORDER — SODIUM CHLORIDE, SODIUM LACTATE, POTASSIUM CHLORIDE, CALCIUM CHLORIDE 600; 310; 30; 20 MG/100ML; MG/100ML; MG/100ML; MG/100ML
125 INJECTION, SOLUTION INTRAVENOUS CONTINUOUS
Status: DISCONTINUED | OUTPATIENT
Start: 2019-09-03 | End: 2019-09-06 | Stop reason: HOSPADM

## 2019-09-03 RX ORDER — EPHEDRINE SULFATE 50 MG/ML
INJECTION INTRAVENOUS AS NEEDED
Status: DISCONTINUED | OUTPATIENT
Start: 2019-09-03 | End: 2019-09-03 | Stop reason: SURG

## 2019-09-03 RX ORDER — DIAPER,BRIEF,INFANT-TODD,DISP
1 EACH MISCELLANEOUS 4 TIMES DAILY PRN
Status: DISCONTINUED | OUTPATIENT
Start: 2019-09-03 | End: 2019-09-06 | Stop reason: HOSPADM

## 2019-09-03 RX ORDER — ALBUTEROL SULFATE 90 UG/1
2 AEROSOL, METERED RESPIRATORY (INHALATION) EVERY 4 HOURS PRN
Status: DISCONTINUED | OUTPATIENT
Start: 2019-09-03 | End: 2019-09-06 | Stop reason: HOSPADM

## 2019-09-03 RX ORDER — FENTANYL CITRATE/PF 50 MCG/ML
25 SYRINGE (ML) INJECTION
Status: DISCONTINUED | OUTPATIENT
Start: 2019-09-03 | End: 2019-09-03

## 2019-09-03 RX ORDER — NALOXONE HYDROCHLORIDE 0.4 MG/ML
0.1 INJECTION, SOLUTION INTRAMUSCULAR; INTRAVENOUS; SUBCUTANEOUS
Status: ACTIVE | OUTPATIENT
Start: 2019-09-03 | End: 2019-09-04

## 2019-09-03 RX ORDER — CALCIUM CARBONATE 200(500)MG
1000 TABLET,CHEWABLE ORAL DAILY PRN
Status: DISCONTINUED | OUTPATIENT
Start: 2019-09-03 | End: 2019-09-06 | Stop reason: HOSPADM

## 2019-09-03 RX ORDER — OXYTOCIN/RINGER'S LACTATE 30/500 ML
PLASTIC BAG, INJECTION (ML) INTRAVENOUS CONTINUOUS PRN
Status: DISCONTINUED | OUTPATIENT
Start: 2019-09-03 | End: 2019-09-03 | Stop reason: SURG

## 2019-09-03 RX ADMIN — CEFAZOLIN SODIUM 2000 MG: 2 SOLUTION INTRAVENOUS at 18:35

## 2019-09-03 RX ADMIN — DEXAMETHASONE SODIUM PHOSPHATE 10 MG: 10 INJECTION, SOLUTION INTRAMUSCULAR; INTRAVENOUS at 19:37

## 2019-09-03 RX ADMIN — Medication 62.5 MILLI-UNITS/MIN: at 20:44

## 2019-09-03 RX ADMIN — NALBUPHINE HYDROCHLORIDE 5 MG: 10 INJECTION, SOLUTION INTRAMUSCULAR; INTRAVENOUS; SUBCUTANEOUS at 21:49

## 2019-09-03 RX ADMIN — SODIUM CHLORIDE, SODIUM LACTATE, POTASSIUM CHLORIDE, AND CALCIUM CHLORIDE 125 ML/HR: .6; .31; .03; .02 INJECTION, SOLUTION INTRAVENOUS at 16:12

## 2019-09-03 RX ADMIN — PHENYLEPHRINE HYDROCHLORIDE 100 MCG: 10 INJECTION INTRAVENOUS at 18:34

## 2019-09-03 RX ADMIN — EPHEDRINE SULFATE 5 MG: 50 INJECTION, SOLUTION INTRAVENOUS at 18:33

## 2019-09-03 RX ADMIN — METOCLOPRAMIDE 10 MG: 5 INJECTION, SOLUTION INTRAMUSCULAR; INTRAVENOUS at 19:35

## 2019-09-03 RX ADMIN — SODIUM CHLORIDE, SODIUM LACTATE, POTASSIUM CHLORIDE, AND CALCIUM CHLORIDE 125 ML/HR: .6; .31; .03; .02 INJECTION, SOLUTION INTRAVENOUS at 18:15

## 2019-09-03 RX ADMIN — CALCIUM CARBONATE (ANTACID) CHEW TAB 500 MG 1000 MG: 500 CHEW TAB at 00:04

## 2019-09-03 RX ADMIN — SODIUM CHLORIDE, SODIUM LACTATE, POTASSIUM CHLORIDE, AND CALCIUM CHLORIDE: .6; .31; .03; .02 INJECTION, SOLUTION INTRAVENOUS at 18:12

## 2019-09-03 RX ADMIN — SODIUM CHLORIDE, SODIUM LACTATE, POTASSIUM CHLORIDE, AND CALCIUM CHLORIDE 125 ML/HR: .6; .31; .03; .02 INJECTION, SOLUTION INTRAVENOUS at 23:50

## 2019-09-03 RX ADMIN — ONDANSETRON 4 MG: 2 INJECTION INTRAMUSCULAR; INTRAVENOUS at 18:59

## 2019-09-03 RX ADMIN — KETOROLAC TROMETHAMINE 30 MG: 30 INJECTION, SOLUTION INTRAMUSCULAR at 20:43

## 2019-09-03 RX ADMIN — FENTANYL CITRATE 100 MCG: 50 INJECTION, SOLUTION INTRAMUSCULAR; INTRAVENOUS at 18:49

## 2019-09-03 RX ADMIN — PHENYLEPHRINE HYDROCHLORIDE 50 MCG/MIN: 10 INJECTION INTRAVENOUS at 18:34

## 2019-09-03 RX ADMIN — MORPHINE SULFATE 2.5 MG: 0.5 INJECTION, SOLUTION EPIDURAL; INTRATHECAL; INTRAVENOUS at 18:49

## 2019-09-03 RX ADMIN — Medication 250 MILLI-UNITS/MIN: at 18:57

## 2019-09-03 RX ADMIN — HYDROMORPHONE HYDROCHLORIDE 0.5 MG: 1 INJECTION, SOLUTION INTRAMUSCULAR; INTRAVENOUS; SUBCUTANEOUS at 23:20

## 2019-09-03 RX ADMIN — SIMETHICONE CHEW TAB 80 MG 80 MG: 80 TABLET ORAL at 23:12

## 2019-09-03 NOTE — DISCHARGE SUMMARY
Discharge Summary - Gerda Goodell 34 y o  female MRN: [id number]    Unit/Bed#: LD Triage - Encounter: [id number]    Admission Date: 9/3/2019     Discharge Date: 19    Admitting Diagnosis:   1  Pregnancy at 38w1d  2  Gestational HTN  3  Hx of 2 prior  sections  4  Desires permanent sterilization  5  GBS bacteruria  6  Obesity  7  Anemia during pregnancy  8  Rh negative  9  Asthma  10  Anxiety    Discharge Diagnosis: same, delivered    Procedures: repeat  section, low transverse incision and bilateral salpingectomy    Admitting Attending: Wiliam Thorpe MD  Delivering Attending: Dr Imani Meredith  Discharging Attending: Dr Ciara Rubalcava Course:     Gerda Goodell is a 34 y o  [id number] at 38w1d wks who was initially admitted for RLTCS and bilateral salpingectomy in the setting of newly diagnosed gestational HTN  Pre-eclampsia labs were drawn and WNL  P/c ratio 0 19  She delivered a viable female  on 9/3/19 at [facility]  Weight 7lbs 2oz via repeat  section, low transverse incision  Apgars were 9 (1 min) and 9 (5 min)   was transferred to  nursery  Patient tolerated the procedure well and was transferred to recovery in stable condition  Her post-operative course was complicated by none  Preoperative hemoglobin was 9 6, postoperative was 7 8  Her postoperative pain was well controlled with oral analgesics  On day of discharge, she was ambulating and able to reasonably perform all ADLs  She was voiding and had appropriate bowel function  Pain was well controlled  She was discharged home on post-operative day #3 without complications  Patient was instructed to follow up with her OB as an outpatient and was given appropriate warnings to call provider if she develops signs of infection or uncontrolled pain      Complications: none apparent    Condition at discharge: good      Discharge Medications:   Prenatal vitamin daily for 6 months or the duration of nursing whichever is longer  Motrin 600 mg orally every 6 hours as needed for pain  Tylenol (over the counter) per bottle directions as needed for pain  Hydrocortisone cream 1% (over the counter) applied 1-2x daily to hemorrhoids as needed  Witch hazel pads for hemorrhoidal discomfort as needed    Discharge instructions: See after visit summary for complete information  Do not place anything (no partner, tampons or douche) in your vagina for 6 weeks  You may walk for exercise for the first 6 weeks then gradually return to your usual activities     Please do not drive for 1 week if you have no stitches and for 2 weeks if you have stitches or underwent a  delivery     You may take baths or shower per your preference     Please look at your bust (breasts) in the mirror daily and call for redness or tenderness or increased warmth     If you have had a  please look at your incision daily as well and call us for increasing redness or steady drainage from the incision     Please call us for temperature > 100 4*F or 38* C, worsening pain or a foul discharge      Disposition: Home    Planned Readmission: No

## 2019-09-03 NOTE — TELEPHONE ENCOUNTER
David Richards 1990  CONFIDENTIALTY NOTICE: This fax transmission is intended only for the addressee  It contains information that is legally privileged,  confidential or otherwise protected from use or disclosure  If you are not the intended recipient, you are strictly prohibited from reviewing,  disclosing, copying using or disseminating any of this information or taking any action in reliance on or regarding this information  If you have  received this fax in error, please notify us immediately by telephone so that we can arrange for its return to us  Page:   Call Id: [id number]  Health Call  Standard Call Report  Health Call  Patient Name: David Richards  Gender: Female  : 1990  Age: 34 Y 6 M 23 D  Return Phone  Number: [phone] (Home)  Address: [address] [place] [address]/[facility]/Zip: [facility] [address]  Practice Name: [facility]  Practice Charged:  Physician:  [facility] Name: Marilee Ascencio  Relationship To  Patient: Self  Return Phone Number: [phone] (Home)  Presenting Problem: "I was in the ER earlier today and  now I'm back in the ER with a blood  pressure of 165/105 and I'd like to  speak to my doctor "  Service Type: Triage  Charged Service 1: Messages  Pharmacy Name and  Number:  Nurse Assessment  Protocols  Protocol Title Nurse Date/Time  Disp  Time Disposition Final User  2019 8:12:21 PM Close Yes Berenice Presume  Comments  User: Ibeth Dupont RN Date/Time: 2019 8:11:57 PM   Spoke with the patient and explained to I would not be able to call the physician if she is already in the Triage area of the  ER  I did instruct her to have the ER call the OB resident on call for them and ask for an OB check  The patient reports that she  is going up to L&D for evaluation now

## 2019-09-03 NOTE — TELEPHONE ENCOUNTER
Late entry due to pt care needs  Pt called clinic demanding to speak to a physician  She was clearly irate when I answered the phone  Pt began describing multiple episodes of severely elevated blood pressures, "feeling crappy", with headaches and spots in her vision  Pt was agitated and frustrated with being seen in L&D last night and sent home  Demanding to know what I wanted her to do  I recommended pt return to care and be evaluated on labor and delivery  Pt clearly upset and irate with this answer, started yelling about living greater than an hour away, no one is listening to her, etc  I continued to recommend evaluation on labor and delivery  I attempted to call the L&D charge nurse, senior OB resident, attending obstetrician; all preoccupied with pt care  I did inform the L&D  that she was on her way      Solitario Echavarria,   OB/GYN, PGY4  9/3/2019, 4:08 PM

## 2019-09-03 NOTE — H&P
H&P Exam - Obstetrics   Ashley Guillen 34 y o  female MRN: [id number]  Unit/Bed#: LD Triage 1 Encounter: [id number]      History of Present Illness     Chief Complaint: Elective  Section, Repeat with tubal sterilization     HPI:  Ashley Guillen is a 34 y o  [id number] female with an MELISSA of 2019, by Ultrasound at 38w1d weeks gestation who is being admitted for repeat low-transverse section with bilateral salpingectomy with indication with gestational hypertension  She declined headache, vision changes and right upper quadrant pain  She decline contractions, fluid leakage, vaginal bleeding and reporting good fetal movements  Contractions: no  Loss of fluid: no  Vaginal bleeding: no  Fetal movement: good    She is SWOB patient  PREGNANCY COMPLICATIONS:   Patient Active Problem List   Diagnosis    38 weeks gestation of pregnancy    Maternal care for scar from previous  delivery    Asthma complicating pregnancy in third trimester    Constipation during pregnancy in second trimester    GBS bacteriuria    Rh negative status in glalo pregnancy in second trimester    Anxiety    Abnormal glucose tolerance test (GTT) during pregnancy, antepartum    Anemia during pregnancy in third trimester    Obesity affecting pregnancy, antepartum       OB History    Para Term  AB Living   4 2 2   1 2   SAB TAB Ectopic Multiple Live Births   1       2      # Outcome Date GA Lbr Sahil/2nd Weight Sex Delivery Anes PTL Lv   4 Current            3 Term 17 39w0d   F CS-Unspec  N PERI      Birth Comments: Pt said that due to anatomy reasons, had another   2 2014     SAB         Birth Comments: Had D&C at [place] [facility]   1 Term 11 42w0d  3487 g (7 lb 11 oz) M CS-Unspec EPI N PERI      Birth Comments: IOL, labor for 36hrs, went for emergency , pt said she didn't dilate       Baby complications/comments:  None    Review of Systems   Constitutional: Negative  HENT: Negative  Respiratory: Negative  Cardiovascular: Negative  Gastrointestinal: Negative  Genitourinary: Negative  Musculoskeletal: Negative  Neurological: Negative  Psychiatric/Behavioral: Negative  Historical Information   Past Medical History:   Diagnosis Date    Anemia     Anemia during pregnancy   Anxiety     Asthma     Depression     Postpartum depression   Kidney infection     Kidney stone     Miscarriage     MRSA infection     Rh incompatibility     Urinary tract infection     Varicella     Pt said she had chicken pox twice when she was younger       Past Surgical History:   Procedure Laterality Date    ADENOIDECTOMY       SECTION      DILATION AND CURETTAGE OF UTERUS  2014    TONSILLECTOMY       Social History   Social History     Substance and Sexual Activity   Alcohol Use No     Social History     Substance and Sexual Activity   Drug Use Not Currently    Types: Marijuana    Comment: last use approx 2018     Social History     Tobacco Use   Smoking Status Former Smoker    Packs/day: 0 00    Types: Cigarettes   Smokeless Tobacco Never Used   Tobacco Comment    approx 2 cigarettes     Family History: non-contributory    Meds/Allergies      Medications Prior to Admission   Medication    albuterol (2 5 mg/3 mL) 0 083 % nebulizer solution    albuterol (PROVENTIL HFA,VENTOLIN HFA) 90 mcg/act inhaler    docusate sodium (COLACE) 100 mg capsule    ferrous sulfate 324 (65 Fe) mg    montelukast (SINGULAIR) 10 mg tablet    Prenatal Vit-Fe Fumarate-FA (PRENATAL VITAMIN PO)        Allergies   Allergen Reactions    Apple Anaphylaxis    Nuts Other (See Comments)     "itchy throat and difficulty swallowing "    Pineapple Anaphylaxis    Shellfish Allergy Other (See Comments)     "itchy throat and difficulty swallowing "    Shellfish-Derived Products Anaphylaxis    Other Hives and Rash     Black berries     Raspberry Hives and Rash    Penicillins Rash       OBJECTIVE:    Vitals: Blood pressure 146/86, pulse 89, resp  rate 18, height 5' 2" (1 575 m), weight 90 7 kg (200 lb), last menstrual period 12/11/2018, currently breastfeeding  Body mass index is 36 58 kg/m²  Physical Exam   Constitutional: She is oriented to person, place, and time  She appears well-developed and well-nourished  No distress  Cardiovascular: Normal rate, regular rhythm and normal heart sounds  Exam reveals no friction rub  No murmur heard  Pulmonary/Chest: Effort normal and breath sounds normal  No stridor  No respiratory distress  Abdominal: Soft  Musculoskeletal: Normal range of motion  Neurological: She is alert and oriented to person, place, and time  Skin: Skin is warm  She is not diaphoretic  Psychiatric: She has a normal mood and affect  Her behavior is normal          Ferning: deferred  Nitrazine: deferred    Cervix:     deferred  Fetal heart rate:      130/ category 1  Daisetta:      Infrequent  EFW: 7 2    GBS:  Negative    Prenatal Labs: I have personally reviewed pertinent reports    , Blood Type:   Lab Results   Component Value Date/Time    ABO Grouping O 07/03/2019 11:13 AM   D (Rh type):   Lab Results   Component Value Date/Time    Rh Factor Negative 07/03/2019 11:13 AM   HCT/HGB:   Lab Results   Component Value Date/Time    Hematocrit 29 5 (L) 09/02/2019 10:48 PM    Hemoglobin 9 7 (L) 09/02/2019 10:48 PM   MCV:   Lab Results   Component Value Date/Time    MCV 88 09/02/2019 10:48 PM   Platelets:   Lab Results   Component Value Date/Time    Platelets 460 53/53/1930 10:48 PM   1 hour Glucola:   Lab Results   Component Value Date/Time    Glucose 140 (H) 07/03/2019 11:13 AM   3 hour GTT:   Lab Results   Component Value Date/Time    Glucose, GTT - 3 Hour 49 (L) 07/24/2019 01:23 PM   Rubella:   Lab Results   Component Value Date/Time    Rubella IgG Quant >175 0 02/13/2019 02:01 PM   VDRL/RPR:   Lab Results   Component Value Date/Time    RPR Non-Reactive 2019 11:13 AM     Urine Culture/Screen:   Lab Results   Component Value Date/Time    Urine Culture (A) 2019 12:33 PM     <10,000 cfu/ml Beta Hemolytic Streptococcus Group B    Urine Culture <10,000 cfu/ml  2019 12:33 PM    Urine Culture >100,000 cfu/ml Lactobacillus species (A) 2019 12:33 PM     Hep B:   Lab Results   Component Value Date/Time    Hepatitis B Surface Ag Non-reactive 2019 02:01 PM   HIV:   Lab Results   Component Value Date/Time    HIV-1/HIV-2 Ab Non-Reactive 2019 02:01 PM      Gonorrhea:   Lab Results   Component Value Date/Time    N gonorrhoeae, DNA Probe Negative 07/10/2019 02:27 PM     Group B Strep:    Lab Results   Component Value Date/Time    Strep Grp B PCR Negative for Beta Hemolytic Strep Grp B by PCR 2019 10:40 AM              Assessment/Plan     ASSESSMENT:  32yo  at 38w1d weeks gestation who is being admitted for repeat low-transverse   PLAN:   1) Admit   2) CBC, RPR, Blood Type   3) Start with preop preparation   4) GBS negative status: no PCN for prophylaxis    5) Analgesia and/or epidural at patient request   6) Anticipate    7) Discussed with Dr Radha Latham      This patient will be an INPATIENT  and I certify the anticipated length of stay is >2 Midnights      Chio Black MD  [id number]  0:13 PM

## 2019-09-03 NOTE — ANESTHESIA PROCEDURE NOTES
CSE Block    Patient location during procedure: OB  Start time: 9/3/2019 6:12 PM  Staffing  Anesthesiologist: Vicky Patten MD  Performed: anesthesiologist   Preanesthetic Checklist  Completed: patient identified, site marked, surgical consent, pre-op evaluation, timeout performed, IV checked, risks and benefits discussed and monitors and equipment checked  CSE  Patient position: sitting  Prep: Betadine  Patient monitoring: continuous pulse ox and frequent blood pressure checks  Approach: midline  Spinal Needle  Needle type: pencil-tip   Needle gauge: 27 G  Needle length: 10 cm  Epidural Needle  Injection technique: ARIANA saline  Needle type: Tuohy   Needle gauge: 20 G  Needle insertion depth: 7 cm  Location: lumbar (1-5)  Catheter  Catheter type: side hole  Catheter size: 20 G  Assessment  Injection Assessment:  positive aspiration for clear CSF, no pain on injection, no paresthesia on injection and negative aspiration for heme

## 2019-09-03 NOTE — PROGRESS NOTES
L&D Triage Note - OB/GYN  Adam Ervin 34 y o  female MRN: [id number]  Unit/Bed#: LD Triage 2 Encounter: [id number]      ASSESSMENT:    Adam Ervin is a 34 y o  [id number] at 38w0d presenting with an elevated blood pressure at home, fatigue and indigestion    PLAN:    1) Anemia   -Venofer Infusion 200mg   -Hg 9 7 Hct 29 5%  2) Indigestion    -Tums 100mg  3) Pre-E R/o   -CMP, CBC, LD and P/c ratio are wnl   -BP's in triage were 140/97,128/82,142/90  4) Continue routine prenatal care  5) Discharge from [facility] triage with term labor precautions    - Reviewed rupture of membranes, false vs true labor, decreased fetal movement, and vaginal bleeding   - Pt to call provider with any concerns and follow up at her next scheduled prenatal appointment on Thursday   - Case discussed with Dr Trejo Fresh:    Adam Ervin 29 y o  [id number] at 38w0d with an Estimated Date of Delivery: 19 presented to the ED earlier in the day for blue lips and fatigue  She was worked up and sent home  She states the ED sent her home even though she had elevated pressures  She took her pressure at home and it was 164/105 so she decided to come back in  She said overall she doesn't feel so great, she is tired and has heart burn  She denies any other obstetric complaints      Her current obstetrical history is significant for anemia    Her past obstetrical history is significant for 2  sections    Contractions: none  Leakage of fluid: none  Vaginal Bleeding: none  Fetal movement: present    OBJECTIVE:    Vitals:    19 2317   BP: 142/90   Pulse: 76   Resp:    Temp:    SpO2:        ROS:  Constitutional: Negative  Respiratory: Negative  Cardiovascular: Negative    Gastrointestinal: Negative    General Physical Exam:  General: in no apparent distress and well developed and well nourished  Cardiovascular: Cor RRR and No murmurs  Lungs: non-labored breathing  Abdomen: abdomen is soft without significant tenderness, masses, organomegaly or guarding  Lower extremeties: nontender    Cervical Exam  Speculum: deferred  SVE: deferred    Fetal monitoring:  FHT:  130 bpm/ Moderate 6 - 25 bpm / 15 x 15  accelerations present, no decelerations  Huetter: no contractions          Lacey Long MD  OBGYN PGY-1  9/2/2019 11:42 PM

## 2019-09-03 NOTE — DISCHARGE INSTRUCTIONS
Self Care After Delivery   AMBULATORY CARE:   The postpartum period  is the period of time from delivery to about 6 weeks  During this time you may experience many physical and emotional changes  It is important to understand what is normal and when you need to call your healthcare provider  It is also important to know how to care for yourself during this time  Call 911 for any of the following:   · You soak through 1 pad in 15 minutes, have blurry vision, clammy or pale skin, and feel faint  · You faint or lose consciousness  · Your heart is beating faster than normal      · You have trouble breathing  · You cough up blood  Seek care immediately if:   · You soak through 1 or more pads in an hour, or pass blood clots larger than a quarter from your vagina  · Your leg is painful, red, and larger than normal      · You have severe abdominal pain  · You have a bad headache or changes in your vision  · Your episiotomy or C section incision is red, swollen, bleeding, or draining pus  · Your incision comes apart  · You see or hear things that are not there, or have thoughts of harming yourself or your baby  Contact your obstetrician or midwife if:   · You have a fever  · You have new or worsening pain in your abdomen or vagina  · You continue to have the baby blues 10 days after you deliver  · You have trouble sleeping  · You have foul-smelling discharge from your vagina  · You have pain or burning when you urinate  · You do not have a bowel movement for 3 days or more  · You have nausea or are vomiting  · You have hard lumps or red streaks over your breasts  · You have cracked nipples or bleed from your nipples  · You have questions or concerns about your condition or care  Physical changes:   The following are normal changes after you give birth:  · Pain in the area between your anus and vagina    · Breast pain    · Constipation or hemorrhoids    · Hot or cold flashes    · Vaginal bleeding or discharge    · Mild to moderate abdominal cramping    · Difficulty controlling bowel movements or urine  Emotional changes: The following are symptoms of the baby blues, or normal emotions after you give birth  The changes in your emotions may be caused by a drop in hormone levels after you deliver  If these symptoms last longer than 1 to 2 weeks after you give birth, you may have postpartum depression  · Feeling irritable    · Feeling sad    · Crying for no reason    · Feeling anxious  Breast care for nursing mothers: You may have sore breasts for 3 to 6 days after you give birth  This happens as your milk begins to fill your breasts  You may also have sore breasts if you do not breastfeed frequently  Do the following to care for your breasts:  · Apply a moist, warm, compress to your breast as directed  This may help soothe your breasts  Make sure the washcloth is not too hot before you apply it to your breast      · Nurse your baby or pump your milk frequently  This may prevent clogged milk ducts  Ask your healthcare provider how often to nurse or pump  · Massage your breasts as directed  This may help increase your milk flow  Gently rub your breasts in a circular motion before you breastfeed  You may need to gently squeeze your breast or nipple to help release milk  You can also use a breast pump to help release milk from your breast      · Wash your breasts with warm water only  Do not put soap on your nipples  Soap may cause your nipples to become dry  · Apply lanolin cream to your nipples as directed  Lanolin cream may add moisture to your skin and prevent nipple dryness  Always  wash off lanolin cream with warm water before you breastfeed  · Place pads in your bra  Your nipples may leak milk when you are not breastfeeding  You can place pads inside of your bra to help prevent leaking onto your clothing   Ask your healthcare provider where to purchase bra pads  · Get breastfeeding support if needed  There are healthcare providers who can answer questions about breastfeeding and provide you with support  Ask your healthcare provider who you can contact if you need breastfeeding support  Breast care for non-nursing mothers:  Milk will fill your breasts even if you bottle feed your baby  Do the following to help stop your milk from filling your breasts and causing pain:  · Wear a bra with support at all times  A sports bra or a tight-fitting bra will help stop your milk from coming in  · Apply ice on each breast for 15 to 20 minutes every hour or as directed  Use an ice pack, or put crushed ice in a plastic bag  Cover it with a towel  Ice helps your milk ducts shrink  · Keep your breasts away from warm water  Warm water will make it easier for milk to fill your breasts  Stand with your breasts away from warm water in the shower  · Limit how much you touch your breasts  This will prevent them from filling with milk  Perineum care: Your perineum is the area between your rectum and vagina  It is normal to have swelling and pain in this area after you give birth  If you had an episiotomy, your healthcare provider may give you special instructions  · Clean your perineum after you use the bathroom  This may prevent infection and help with healing  Use a spray bottle with warm water to clean your perineum  You may also gently spray warm water against your perineum when you urinate  Always wipe front to back  · Take a sitz bath as directed  A sitz bath may help relieve swelling and pain  Fill your bath tub or bucket with water up to your hips and sit in the water  Use cold water for 2 days after you deliver  Then use warm water  Ask your healthcare provider for more information about a sitz bath  · Apply ice packs for the first 24 hours or as directed  Use a plastic glove filled with ice or buy an ice pack   Wrap the ice pack or plastic glove in a small towel or wash cloth  Place the ice pack on your perineum for 20 minutes at a time  · Sit on a donut-shaped pillow  This may relieve pressure on your perineum when you sit  · Use wipes with medicine or take pills as directed  Your healthcare provider may tell you to use witch hazel pads  You can place witch hazel pads in the refrigerator before you apply them to your perineum  He may also tell you to take NSAIDs  Ask your healthcare provider how often to take pills or use wipes with medicine  · Do not go swimming or take tub baths for 4 to 6 weeks or as directed  This will help prevent an infection in your vagina or uterus  Bowel and bladder care: It may take 3 to 5 days to have a bowel movement after you deliver your baby  You can do the following to prevent or manage constipation, and get control of your bowel or bladder:  · Take stool softeners as directed  A stool softener is medicine that will make your bowel movements softer  This may prevent or relieve constipation  A stool softener may also make bowel movements less painful  · Drink plenty of liquids  Ask how much liquid to drink each day and which liquids are best for you  Liquids may help prevent constipation  · Eat foods high in fiber  Examples include fruits, vegetables, grains, beans, and lentils  Ask your healthcare provider how much fiber you need each day  Fiber may prevent constipation  · Do Kegel exercises as directed  Kegel exercises will help strengthen the muscles that control bowel movements and urination  Ask your healthcare provider for more information on Kegel exercises  · Apply cold compresses or medicine to hemorrhoids as directed  This may relieve swelling and pain  Your healthcare provider may tell you to apply ice or wipes with medicine to your hemorrhoids  He may also tell you to use a sitz bath   Ask your healthcare for more information on how to manage hemorrhoids  Nutrition:  Good nutrition is important in the postpartum period  It will help you return to a healthy weight, increase your energy levels, and prevent constipation  It will also help you get enough nutrients and calories if you are going to breastfeed your baby  · Eat a variety of healthy foods  Healthy foods include fruits, vegetables, whole-grain breads, low-fat dairy products, beans, lean meats, and fish  You may need 500 to 700 additional calories each day if you breastfeed your baby  You may also need extra protein  · Limit foods with added sugar and high amounts of fat  These foods are high in calories and low in healthy nutrients  Read food labels so you know how much sugar and fat is in the food you want to eat  · Drink 8 to 10 glasses of water per day  Water will help you make plenty of milk for your baby  It will also help prevent constipation  Drink a glass of water every time you breastfeed your baby  · Take vitamins as directed  Ask your healthcare provider what vitamins you need  · Limit caffeine and alcohol if you are breastfeeding  Caffeine and alcohol can get into your breast milk  Caffeine and alcohol can make your baby fussy  They can also interfere with your baby's sleep  Ask your healthcare provider if you can drink alcohol or caffeine  Rest and sleep: You may feel very tired in the postpartum period  Enough sleep will help you heal and give you energy to care for your baby  The following may help you get sleep and rest:  · Nap when your baby naps  Your baby may nap several times during the day  Get rest during this time  · Limit visitors  Many people may want to see you and your baby  Ask friends or family to visit on different days  This will give you time to rest      · Do not plan too much for one day  Put off household chores so that you have time to rest  Gradually do more each day  · Ask for help from family, friends, or neighbors    Ask them to help you with laundry, cleaning, or errands  Also ask someone to watch the baby while you take a nap or relax  Ask your partner to help with the care of your baby  Pump some of your breast milk so your partner can feed your baby during the night  Exercise after delivery:  Wait until your healthcare provider says it is okay to exercise  Exercise can help you lose weight, increase your energy levels, and manage your mood  It can also prevent constipation and blood clots  Start with gentle exercises such as walking  Do more as you have more energy  You may need to avoid abdominal exercises for 1 to 2 weeks after you deliver  Talk to your healthcare provider about an exercise plan that is right for you  Sexual activity after delivery:   · Do not have sex until your healthcare provider says it is okay  You may need to wait 4 to 6 weeks before you have sex  This may prevent infection and allow time to heal      · Your menstrual cycle may begin as soon as 3 weeks after you deliver  Your period may be delayed if you breastfeed your baby  You can become pregnant before you get your first postpartum period  Talk to your healthcare provider about birth control that is right for you  Some types of birth control are not safe during breastfeeding  For support and more information:  Join a support group for new mothers  Ask for help from family and friends with chores, errands, and care of your baby  · Office of Women's Health, [place] Department of Health and Human Services  [address], [id number] [place] [address]  [address], [id number] [place] [address]  Phone: 9- [phone]  Web Address: www womenshealth gov  · [facility] Postpartum [address] [facility] , [address] [place] [address]  [address]  Web Address: [employer] [facility]  org/pregnancy/postpartum-care  aspx  Follow up with your obstetrician or midwife as directed:   You will need to follow up with your healthcare provider in 2 to 6 weeks after delivery  Write down your questions so you remember to ask them at your visits  © 2017 [address] [employer] Information is for End User's use only and may not be sold, redistributed or otherwise used for commercial purposes  All illustrations and images included in CareNotes® are the copyrighted property of [employer] M [employer] Inc  or [employer]  The above information is an  only  It is not intended as medical advice for individual conditions or treatments  Talk to your doctor, nurse or pharmacist before following any medical regimen to see if it is safe and effective for you

## 2019-09-03 NOTE — PROGRESS NOTES
Reviewed consent for tubal sterilization  Discussed risk of surgery including, but not limited to: infection, blood loss requiring transfusion, damage to surrounding organs, such as bladder, ureters, bowel, neurovasculature of pelvis; discussed the permanent and irreversible nature of procedure and risk of regret  Pt absolutely sure she is done childbearing  Discussed that ability to perform salpingectomy would be determined intra-operatively and reviewed increased risk of adhesive disease  Discussed failure of procedure and if becomes pregnant, risk of ectopic pregnancy  Pt verbalized understanding and all questions answered  Informed consent signed  She had previously signed MA 31 on 7/2      Jaime Henriquez MD  OBGYN, PGY-3  9/3/2019 5:48 PM

## 2019-09-03 NOTE — ANESTHESIA PREPROCEDURE EVALUATION
Review of Systems/Medical History  Patient summary reviewed  Chart reviewed      Cardiovascular  Hypertension pregnancy-induced hypertension,    Pulmonary       GI/Hepatic       Kidney stones,        Endo/Other     GYN       Hematology  Anemia ,     Musculoskeletal       Neurology   Psychology           Physical Exam    Airway    Mallampati score: I  TM Distance: >3 FB  Neck ROM: full     Dental       Cardiovascular      Pulmonary      Other Findings        Anesthesia Plan  ASA Score- 2     Anesthesia Type- spinal with ASA Monitors  Additional Monitors:   Airway Plan:         Plan Factors-    Induction-     Postoperative Plan-     Informed Consent- Anesthetic plan and risks discussed with patient

## 2019-09-03 NOTE — TELEPHONE ENCOUNTER
Pt left voicemail @ [phone] stating she was seen in Ed/triage for elevated blood pressure  Spoke with pt at 12:02 pm  Pt was in the car and on her way to ED for blood pressure, "feel like crap "  Offered pt a 1:30 pm appt to be evaluated in office  Pt stated she stuck in traffic and does not want to be late  She will call our office once she arrives at the hospital and make a decision where to be seen

## 2019-09-03 NOTE — PLAN OF CARE
Problem: BIRTH - VAGINAL/ SECTION  Goal: Fetal and maternal status remain reassuring during the birth process  Description  INTERVENTIONS:  - Monitor vital signs  - Monitor fetal heart rate  - Monitor uterine activity  - Monitor labor progression (vaginal delivery)  - DVT prophylaxis  - Antibiotic prophylaxis  Outcome: Progressing  Goal: Emotionally satisfying birthing experience for mother/fetus  Description  Interventions:  - Actively participate in both the patient's and family's teaching of the birth process  - Consider cultural, Holiness and age-specific factors and plan care for the patient   Outcome: Progressing     Problem: PAIN - ADULT  Goal: Verbalizes/displays adequate comfort level or baseline comfort level  Description  Interventions:  - Encourage patient to monitor pain and request assistance  - Assess pain using appropriate pain scale  - Administer analgesics based on type and severity of pain and evaluate response  - Implement non-pharmacological measures as appropriate and evaluate response  - Consider cultural and social influences on pain and pain management  - Notify physician/advanced practitioner if interventions unsuccessful or patient reports new pain  Outcome: Progressing     Problem: INFECTION - ADULT  Goal: Absence or prevention of progression during hospitalization  Description  INTERVENTIONS:  - Assess and monitor for signs and symptoms of infection  - Monitor lab/diagnostic results  - Monitor all insertion sites, i e  indwelling lines, tubes, and drains  - Instruct and encourage patient and family to use good hand hygiene technique     Outcome: Progressing  Goal: Absence of fever/infection during neutropenic period  Description  INTERVENTIONS:  - Monitor WBC    Outcome: Progressing     Problem: SAFETY ADULT  Goal: Patient will remain free of falls  Description  INTERVENTIONS:  - Assess patient frequently for physical needs  -  Identify cognitive and physical deficits and behaviors that affect risk of falls  Outcome: Progressing  Goal: Maintain or return to baseline ADL function  Description  INTERVENTIONS:  -  Assess patient's ability to carry out ADLs; assess patient's baseline for ADL function and identify physical deficits which impact ability to perform ADLs (bathing, care of mouth/teeth, toileting, grooming, dressing, etc )  - Assess patient's mobility; develop plan if impaired         Outcome: Progressing  Goal: Maintain or return mobility status to optimal level  Description  INTERVENTIONS:  - Assess patient's baseline mobility status (ambulation, transfers, stairs, etc )         Outcome: Progressing     Problem: Knowledge Deficit  Goal: Patient/family/caregiver demonstrates understanding of disease process, treatment plan, medications, and discharge instructions  Description  Complete learning assessment and assess knowledge base    Interventions:  - Provide teaching at level of understanding  - Provide teaching via preferred learning methods  Outcome: Progressing     Problem: DISCHARGE PLANNING  Goal: Discharge to home or other facility with appropriate resources  Description  INTERVENTIONS:  - Identify barriers to discharge w/patient and caregiver  - Arrange for needed discharge resources and transportation as appropriate  - Identify discharge learning needs (meds, wound care, etc )  - Refer to Case Management Department for coordinating discharge planning if the patient needs post-hospital services based on physician/advanced practitioner order or complex needs related to functional status, cognitive ability, or social support system   Outcome: Progressing

## 2019-09-04 LAB
ABO GROUP BLD: NORMAL
ALBUMIN SERPL BCP-MCNC: 2.2 G/DL (ref 3.5–5)
ALP SERPL-CCNC: 87 U/L (ref 46–116)
ALT SERPL W P-5'-P-CCNC: 12 U/L (ref 12–78)
ANION GAP SERPL CALCULATED.3IONS-SCNC: 8 MMOL/L (ref 4–13)
AST SERPL W P-5'-P-CCNC: 14 U/L (ref 5–45)
BASOPHILS # BLD AUTO: 0.01 THOUSANDS/ΜL (ref 0–0.1)
BASOPHILS NFR BLD AUTO: 0 % (ref 0–1)
BILIRUB SERPL-MCNC: 0.23 MG/DL (ref 0.2–1)
BLD GP AB SCN SERPL QL: POSITIVE
BUN SERPL-MCNC: 5 MG/DL (ref 5–25)
CALCIUM SERPL-MCNC: 8.7 MG/DL (ref 8.3–10.1)
CHLORIDE SERPL-SCNC: 109 MMOL/L (ref 100–108)
CO2 SERPL-SCNC: 24 MMOL/L (ref 21–32)
CREAT SERPL-MCNC: 0.57 MG/DL (ref 0.6–1.3)
EOSINOPHIL # BLD AUTO: 0 THOUSAND/ΜL (ref 0–0.61)
EOSINOPHIL NFR BLD AUTO: 0 % (ref 0–6)
ERYTHROCYTE [DISTWIDTH] IN BLOOD BY AUTOMATED COUNT: 13.6 % (ref 11.6–15.1)
FETAL CELL SCN BLD QL ROSETTE: NEGATIVE
GFR SERPL CREATININE-BSD FRML MDRD: 126 ML/MIN/1.73SQ M
GLUCOSE SERPL-MCNC: 96 MG/DL (ref 65–140)
HCT VFR BLD AUTO: 24.6 % (ref 34.8–46.1)
HGB BLD-MCNC: 7.8 G/DL (ref 11.5–15.4)
IMM GRANULOCYTES # BLD AUTO: 0.13 THOUSAND/UL (ref 0–0.2)
IMM GRANULOCYTES NFR BLD AUTO: 1 % (ref 0–2)
LYMPHOCYTES # BLD AUTO: 1.22 THOUSANDS/ΜL (ref 0.6–4.47)
LYMPHOCYTES NFR BLD AUTO: 8 % (ref 14–44)
MCH RBC QN AUTO: 27.4 PG (ref 26.8–34.3)
MCHC RBC AUTO-ENTMCNC: 31.7 G/DL (ref 31.4–37.4)
MCV RBC AUTO: 86 FL (ref 82–98)
MONOCYTES # BLD AUTO: 0.85 THOUSAND/ΜL (ref 0.17–1.22)
MONOCYTES NFR BLD AUTO: 5 % (ref 4–12)
NEUTROPHILS # BLD AUTO: 13.66 THOUSANDS/ΜL (ref 1.85–7.62)
NEUTS SEG NFR BLD AUTO: 86 % (ref 43–75)
NRBC BLD AUTO-RTO: 0 /100 WBCS
PLATELET # BLD AUTO: 174 THOUSANDS/UL (ref 149–390)
PMV BLD AUTO: 10.7 FL (ref 8.9–12.7)
POTASSIUM SERPL-SCNC: 3.5 MMOL/L (ref 3.5–5.3)
PROT SERPL-MCNC: 5.5 G/DL (ref 6.4–8.2)
RBC # BLD AUTO: 2.85 MILLION/UL (ref 3.81–5.12)
RH BLD: NEGATIVE
RPR SER QL: NORMAL
SODIUM SERPL-SCNC: 141 MMOL/L (ref 136–145)
WBC # BLD AUTO: 15.87 THOUSAND/UL (ref 4.31–10.16)

## 2019-09-04 PROCEDURE — 80053 COMPREHEN METABOLIC PANEL: CPT | Performed by: OBSTETRICS & GYNECOLOGY

## 2019-09-04 PROCEDURE — 86900 BLOOD TYPING SEROLOGIC ABO: CPT | Performed by: OBSTETRICS & GYNECOLOGY

## 2019-09-04 PROCEDURE — 85025 COMPLETE CBC W/AUTO DIFF WBC: CPT | Performed by: OBSTETRICS & GYNECOLOGY

## 2019-09-04 PROCEDURE — 86850 RBC ANTIBODY SCREEN: CPT | Performed by: OBSTETRICS & GYNECOLOGY

## 2019-09-04 PROCEDURE — 86901 BLOOD TYPING SEROLOGIC RH(D): CPT | Performed by: OBSTETRICS & GYNECOLOGY

## 2019-09-04 PROCEDURE — 85461 HEMOGLOBIN FETAL: CPT | Performed by: OBSTETRICS & GYNECOLOGY

## 2019-09-04 PROCEDURE — 99024 POSTOP FOLLOW-UP VISIT: CPT | Performed by: OBSTETRICS & GYNECOLOGY

## 2019-09-04 RX ADMIN — KETOROLAC TROMETHAMINE 30 MG: 30 INJECTION, SOLUTION INTRAMUSCULAR at 02:29

## 2019-09-04 RX ADMIN — KETOROLAC TROMETHAMINE 30 MG: 30 INJECTION, SOLUTION INTRAMUSCULAR at 14:00

## 2019-09-04 RX ADMIN — HYDROMORPHONE HYDROCHLORIDE 0.5 MG: 1 INJECTION, SOLUTION INTRAMUSCULAR; INTRAVENOUS; SUBCUTANEOUS at 17:18

## 2019-09-04 RX ADMIN — HUMAN RHO(D) IMMUNE GLOBULIN 300 MCG: 300 INJECTION, SOLUTION INTRAMUSCULAR at 12:32

## 2019-09-04 RX ADMIN — PRENATAL VIT W/ FE FUMARATE-FA TAB 27-0.8 MG 1 TABLET: 27-0.8 TAB at 08:33

## 2019-09-04 RX ADMIN — IBUPROFEN 600 MG: 600 TABLET, FILM COATED ORAL at 23:39

## 2019-09-04 RX ADMIN — HYDROMORPHONE HYDROCHLORIDE 0.5 MG: 1 INJECTION, SOLUTION INTRAMUSCULAR; INTRAVENOUS; SUBCUTANEOUS at 03:30

## 2019-09-04 RX ADMIN — FERROUS SULFATE TAB 325 MG (65 MG ELEMENTAL FE) 325 MG: 325 (65 FE) TAB at 07:06

## 2019-09-04 RX ADMIN — OXYCODONE HYDROCHLORIDE AND ACETAMINOPHEN 2 TABLET: 5; 325 TABLET ORAL at 20:03

## 2019-09-04 RX ADMIN — HYDROMORPHONE HYDROCHLORIDE 0.5 MG: 1 INJECTION, SOLUTION INTRAMUSCULAR; INTRAVENOUS; SUBCUTANEOUS at 12:31

## 2019-09-04 RX ADMIN — DOCUSATE SODIUM 100 MG: 100 CAPSULE, LIQUID FILLED ORAL at 08:33

## 2019-09-04 RX ADMIN — NALBUPHINE HYDROCHLORIDE 5 MG: 10 INJECTION, SOLUTION INTRAMUSCULAR; INTRAVENOUS; SUBCUTANEOUS at 02:28

## 2019-09-04 RX ADMIN — KETOROLAC TROMETHAMINE 30 MG: 30 INJECTION, SOLUTION INTRAMUSCULAR at 08:33

## 2019-09-04 RX ADMIN — DIPHENHYDRAMINE HYDROCHLORIDE 25 MG: 50 INJECTION, SOLUTION INTRAMUSCULAR; INTRAVENOUS at 04:39

## 2019-09-04 RX ADMIN — FERROUS SULFATE TAB 325 MG (65 MG ELEMENTAL FE) 325 MG: 325 (65 FE) TAB at 10:24

## 2019-09-04 RX ADMIN — HYDROMORPHONE HYDROCHLORIDE 0.5 MG: 1 INJECTION, SOLUTION INTRAMUSCULAR; INTRAVENOUS; SUBCUTANEOUS at 10:23

## 2019-09-04 RX ADMIN — SIMETHICONE CHEW TAB 80 MG 80 MG: 80 TABLET ORAL at 10:28

## 2019-09-04 RX ADMIN — DOCUSATE SODIUM 100 MG: 100 CAPSULE, LIQUID FILLED ORAL at 17:18

## 2019-09-04 NOTE — OP NOTE
OPERATIVE REPORT  PATIENT NAME: Maggi Morales    :  1990  MRN: [id number]  Pt Location: [facility] OR ROOM 02    SURGERY DATE: 9/3/2019    Surgeon(s) and Role:     * Valentina West DO - Primary     * Ivory Davis MD - Assisting    Preop Diagnosis:  IUP at 38w1d  Gestational HTN  Previous  section complicating pregnancy  Requests permanent sterilization  Anemia  Rh negative  GBS bacteruria  Asthma  Obesity  Anxiety    Post-Op Diagnosis Codes:  Same as above, s/p RLTCS and bilateral salpingectomy    Procedure(s) (LRB):   SECTION () REPEAT (N/A)  LIGATION/COAGULATION TUBAL (Bilateral)    Specimen(s):  ID Type Source Tests Collected by Time Destination   1 : fallopian tube left Tissue Fallopian Tube, Left TISSUE EXAM Valentina West, DO 9/3/2019 1909    2 : right Tissue Fallopian Tube, Right TISSUE EXAM Valentina West, DO 9/3/2019 191    A : placenta to storage Tissue (Placenta on Hold) OB Only Placenta PLACENTA IN STORAGE Valentina West DO 9/3/2019 185    B : cord gases Cord Blood Cord BLOOD GAS, VENOUS, CORD, BLOOD GAS, ARTERIAL, CORD Valentina West, DO 9/3/2019 185        Quantitative Blood Loss:   699mL    Drains:  Urethral Catheter Double-lumen;Non-latex 16 Fr  (Active)   Site Assessment Clean;Skin intact 9/3/2019  6:35 PM   Collection Container Standard drainage bag 9/3/2019  6:35 PM   Number of days: 0       Anesthesia Type:   Combined Spinal Epidural    Operative Indications:  IUP at 38w1d  Gestational HTN  Hx of 2 prior  sections  Requests permanent sterilization    Operative Findings:  1  Thin lower uterine segment  2  Dense fascia  3  Delivery of viable female on 19 at 1855, weight 7lbs 2oz;  Apgar scores of 9 at one minute and 9 at five minutes  4  Normal appearing placenta with centrally-inserted 3 vessel cord  5  Clear amniotic fluid  6   Grossly normal uterus, tubes, and ovaries aside from approximately 1cm, circumferential subserosal fibroid on anterior surface of uterus  Left fallopian tube was noted to have thin adhesions to round ligament  7  Thin lower uterine segment  8  Dense fascia    Specimens:  1  Arterial and venous cord gases  Arterial cord gas collected, however, insufficient sample for testing  Venous pH: 7 357, Base excess: -4 0  2  Cord blood  3  Segment of umbilical cord  4  Placenta to storage   5  Bilateral fallopian tubes    Procedure Details  Decision was made to proceed with  section due to hx of 2 prior  sections in the setting of newly diagnosed gestational HTN  Pre-eclampsia labs WNL  Patient was made aware of these findings and the proposed plan  Risks, benefits, possible complications, alternate treatment options, and expected outcomes were discussed with the patient  The patient agreed with the proposed plan and gave informed consent  The patient was taken to the operating room where she was properly identified to the OR staff and attending physician  She received a combined spinal epidural block by Dr Carlton Escoto preoperatively  Patient was placed in dorsal supine position with left lateral tilt of hips  Fetal heart tones were appreciated and found to be appropriate in the 140s  A Dewitt catheter and SCDs were then placed  The vagina was prepped with Betadine  The abdomen was prepped with Chloraprep and following appropriate drying time, the patient was draped in the usual sterile manner for a Pfannenstiel incision  The patient had received Ancef 2g IV pre-operatively for prophylaxis  A Time Out was held and the above information confirmed  The patient was identified as Medina Rather and the procedure verified as  Delivery  A Pfannenstiel incision was made and carried down through the underlying subcutaneous tissue to the fascia using a scalpel  Bleeding noted in the subcutaneous tissues were made hemostatic with Bovie electrocautery   The rectus fascia was then incised in the midline and extended laterally using Waller scissors  The superior edge of the fascia was grasped with Kocher clamps, tented upward, and the underlying muscle was dissected off using the scalpel  The inferior edge was grasped with Kocher clamps and cleared in similar fashion using Waller scissors  All anatomic layers were well-demarcated  The rectus muscles were  and the peritoneum was identified and subsequently entered  A blind finger sweep was performed and there was no adhesive disease noted  The peritoneum was then extended longitudinally with blunt dissection  The vesicouterine peritoneum was identified and a bladder flap was created using Metzenbaum scissors  The bladder blade was inserted  A low transverse uterine incision was made with the scalpel and extended laterally with blunt dissection  The amnion was entered sharply with an Allis Clamp and noted to be clear  The Surgeon's hand was inserted through the hysterotomy and the fetal head was palpated, elevated, and delivered through the uterine incision with the assistance of fundal pressure  The  had spontaneous cry with good color and tone  The umbilical cord was clamped and cut after a delay of 30 seconds  The infant was handed off to the  providers  Arterial and venous cord gases, cord blood, and a segment of umbilical cord were obtained for evaluation and promptly sent to the lab  The placenta delivered spontaneously with uterine fundal massage and was noted to have a  3-vessel cord  This was also sent to the lab for storage  The uterus was exteriorized and a moist lap sponge was used to clear the cavity of clots and products of conception  The uterine incision was closed with a running locked suture of 0-Vicryl  A second layer using 0-Monocryl suture was used to imbricate the first  Good hemostasis was confirmed upon uterine closure  Attention was then turned to perform a bilateral salpingectomy   The right fallopian tube was isolated and followed all the way to the fimbriated ends  This was tented up with a Catracho clamp around the proximal portion of the tubal ampulla revealing the vascular supply of the mesosalpinx  A veena clamp was then placed along the mesosalpinx working proximally  This segment of fallopian tube was excised with Bovie electrocautery  The pedicle was suture ligated with a single strand of 2-0 Plain suture  This technique was repeated x2, working proximally along the fallopian tube, which was then excised 2cm from the cornua  The left fallopian tube was then excised in similar fashion  Both tubes sent to the lab for pathology  Good hemostasis was confirmed following salpingectomy  The uterus was returned to the abdomen  The paracolic gutters and tubal sites were inspected  They were noted to be hemostatic  The incision was re-examined and bleeding was noted in the midline, which was made hemostatic with 1 figure-of-eight suture using 0-Monocryl  The rectus muscle and fascia were inspected for bleeding of which there was none  The fascia was then closed with a running suture of 0-Vicryl  The subcutaneous tissues were then suction irrigated with warmed normal saline  Bleeding in this layer was made hemostatic with Bovie electrocautery  They were then re-approximated with a running suture of 2-0 Plain  The skin was closed with 4-0 Vicryl in a subcuticular fashion using a Stephanie Hammock needle  Exofin was applied  Sterile dressing was applied and an abdominal binder was then placed  At the conclusion of the procedure, all needle, sponge, and instrument counts were noted to be correct x2  The patient tolerated the procedure well and was transferred to her the recovery room in stable condition  Dr Gerhardt Monks was present and participated in all key portions of the case      Complications:   None    Patient Disposition:  PACU  and hemodynamically stable    SIGNATURE: Jayden Sandoval MD  DATE: September 3, 2019  TIME: 8:00 PM

## 2019-09-04 NOTE — SOCIAL WORK
Hx THC: UDS collected AFTER  (morphine) through spinal, so came back positive for opiates but negative for everything else; Needs breast pump  CM spoke with MOB who she stephen with FOB/SO, two other children  MOB reports having baby items, cars and family supports  MOB plans to breast feed and is breast pump  MOB unsure if she previously ordered pump through current insurance  MOB thinks it may have been through previous insurer two years ago  Freedom of choice given  [facility] referral sent to [facility] for Medela pump per MOB request for Thursday delivery to room if approved  MOB aware that she may only have manual pump if previously received pump via [employer]  MOB reports hx THC use with last use reported as prior to pregnancy  Per review of chart, MOB positive only for meds given during delivery  Baby UDS negative  No further CM needs noted

## 2019-09-04 NOTE — LACTATION NOTE
This note was copied from a baby's chart  CONSULT - LACTATION  Baby Girl Margaret Person 1 days female MRN: [id number]    [facility] Room / Bed: (N)/(N) Encounter: [id number]    Maternal Information     MOTHER:  Toma Farah  Maternal Age: 34 y o    OB History: #: 1, Date: 11, Sex: Male, Weight: 3487 g (7 lb 11 oz), GA: 42w0d, Delivery: , Unspecified, Apgar1: None, Apgar5: None, Living: Living, Birth Comments: IOL, labor for 36hrs, went for emergency , pt said she didn't dilate    #: 2, Date: , Sex: None, Weight: None, GA: None, Delivery: Spontaneous , Apgar1: None, Apgar5: None, Living: None, Birth Comments: Had D&C at [place] [facility]    #: 3, Date: 17, Sex: Female, Weight: None, GA: 39w0d, Delivery: , Unspecified, Apgar1: None, Apgar5: None, Living: Living, Birth Comments: Pt said that due to anatomy reasons, had another   #: 4, Date: 19, Sex: Female, Weight: 3232 g (7 lb 2 oz), GA: 38w1d, Delivery: , Low Transverse, Apgar1: 9, Apgar5: 9, Living: Living, Birth Comments: None   Previouse breast reduction surgery?  No    Lactation history:   Has patient previously breast fed: Yes   How long had patient previously breast fed: 2 weeks for the first, 5-6 mo for the second   Previous breast feeding complications: Lack of support     Past Surgical History:   Procedure Laterality Date    ADENOIDECTOMY       SECTION      DILATION AND CURETTAGE OF UTERUS      MD  DELIVERY ONLY N/A 9/3/2019    Procedure:  SECTION () REPEAT;  Surgeon: Anthony Rios DO;  Location: [facility] ;  Service: Obstetrics    MD Rachael Manners TUBE W/ Bilateral 9/3/2019    Procedure: LIGATION/COAGULATION TUBAL;  Surgeon: Anthony Rios DO;  Location: [facility] ;  Service: Obstetrics    TONSILLECTOMY         Birth information:  YOB: 2019   Time of birth: 6:55 PM   Sex: female   Delivery type: , Low Transverse   Birth Weight: 3232 g (7 lb 2 oz)   Percent of Weight Change: 0%     Gestational Age: 43w4d   [unfilled]    Assessment       Feeding recommendations:  breast feed on demand     Met with mother  Provided mother with Ready, Set, Baby booklet  Discussed Skin to Skin contact an benefits to mom and baby  Talked about the delay of the first bath until baby has adjusted  Spoke about the benefits of rooming in  Feeding on cue and what that means for recognizing infant's hunger  Avoidance of pacifiers for the first month discussed  Talked about exclusive breastfeeding for the first 6 months  Positioning and latch reviewed as well as showing images of other feeding positions  Discussed the properties of a good latch in any position  Reviewed hand/manual expression  Discussed s/s that baby is getting enough milk and some s/s that breastfeeding dyad may need further help  Gave information on common concerns, what to expect the first few weeks after delivery, preparing for other caregivers, and how partners can help  Resources for support also provided  Discussed 2nd night syndrome and ways to calm infant  Hand out given  Information on hand expression given  Discussed benefits of knowing how to manually express breast including stimulating milk supply, softening nipple for latch and evacuating breast in the event of engorgement  Discussed risks of early supplementation  And expectations for pumping in this early postpartum period  Discussed other ways FOB can be involved in care other than feeding as breastfeeding frequently is important for establishing milk supply  Discussed APNO cream  No other concerns at this time  Ext provided and enc her to call for lactation support as needed throughout her stay       Hilary Rojas RN 2019 4:50 PM

## 2019-09-04 NOTE — PROGRESS NOTES
Quick Note:    Patient called the office with complaint of not feeling well  States that she's had blurred vision, a headache rated 7/10, and is nauseous and weak  She took her BP reading at home and her systolic was in the 058'R  In the office today, her initial BP was 144/94 with a repeat of 129/92  Patient had a scheduled repeat  for next week  Plan: Discussed with patient the new diagnosis of gestational HTN and that she would be sent directly to labor and delivery for a planned c section this evening  Patient had a small amount of pasta around 10am  NPO starting now       Dr Perez Griffin was present for the visit

## 2019-09-04 NOTE — RESPIRATORY THERAPY NOTE
RT Protocol Note  Lucero Reed 34 y o  female MRN: [id number]  Unit/Bed#: -01 Encounter: [id number]    Assessment    Principal Problem:    S/P repeat low transverse   Active Problems:    38 weeks gestation of pregnancy    Gestational HTN, third trimester    Status post bilateral salpingectomy      Home Pulmonary Medications:  Albuterol       Past Medical History:   Diagnosis Date    Anemia     Anemia during pregnancy   Anxiety     Asthma     Depression     Postpartum depression   Kidney infection     Kidney stone     Miscarriage     MRSA infection     Rh incompatibility     Urinary tract infection     Varicella     Pt said she had chicken pox twice when she was younger       Social History     Socioeconomic History    Marital status: Single     Spouse name: Not on file    Number of children: Not on file    Years of education: Not on file    Highest education level: Not on file   Occupational History    Not on file   Social Needs    Financial resource strain: Not on file    Food insecurity:     Worry: Not on file     Inability: Not on file    Transportation needs:     Medical: Not on file     Non-medical: Not on file   Tobacco Use    Smoking status: Former Smoker     Packs/day: 0 00     Types: Cigarettes    Smokeless tobacco: Never Used    Tobacco comment: approx 2 cigarettes   Substance and Sexual Activity    Alcohol use: No    Drug use: Not Currently     Types: Marijuana     Comment: last use approx 2018    Sexual activity: Yes     Partners: Male     Birth control/protection: None   Lifestyle    Physical activity:     Days per week: Not on file     Minutes per session: Not on file    Stress: Not on file   Relationships    Social connections:     Talks on phone: Not on file     Gets together: Not on file     Attends [employer] service: Not on file     Active member of club or organization: Not on file     Attends meetings of clubs or organizations: Not on file Relationship status: Not on file    Intimate partner violence:     Fear of current or ex partner: Not on file     Emotionally abused: Not on file     Physically abused: Not on file     Forced sexual activity: Not on file   Other Topics Concern    Not on file   Social History Narrative    Not on file       Subjective         Objective    Physical Exam:   Assessment Type: (P) Assess only  Bilateral Breath Sounds: (P) Clear  Cough: (P) None    Vitals:  Blood pressure 132/76, pulse (P) 73, temperature 98 2 °F (36 8 °C), temperature source Oral, resp  rate 16, height 5' 2" (1 575 m), weight 90 7 kg (200 lb), last menstrual period 12/11/2018, SpO2 (P) 99 %, currently breastfeeding  Imaging and other studies: I have personally reviewed pertinent reports  Plan    Respiratory Plan: (P) Home Bronchodilator Patient pathway        Resp Comments: (P) Pt  evaluated for respiratory protocol  BS=clear and well aerated  Pt  in no distress on RMA  SpO2= 99%  Pt  continued on prn albuterol for asthma hx  no further respiratory intervention is required at this time  Will continue to monitor and titrate care accordingly

## 2019-09-04 NOTE — PLAN OF CARE
Problem: PAIN - ADULT  Goal: Verbalizes/displays adequate comfort level or baseline comfort level  Description  Interventions:  - Encourage patient to monitor pain and request assistance  - Assess pain using appropriate pain scale  - Administer analgesics based on type and severity of pain and evaluate response  - Implement non-pharmacological measures as appropriate and evaluate response  - Consider cultural and social influences on pain and pain management  - Notify physician/advanced practitioner if interventions unsuccessful or patient reports new pain  Outcome: Progressing     Problem: INFECTION - ADULT  Goal: Absence or prevention of progression during hospitalization  Description  INTERVENTIONS:  - Assess and monitor for signs and symptoms of infection  - Monitor lab/diagnostic results  - Monitor all insertion sites, i e  indwelling lines, tubes, and drains  - Instruct and encourage patient and family to use good hand hygiene technique     Outcome: Progressing  Goal: Absence of fever/infection during neutropenic period  Description  INTERVENTIONS:  - Monitor WBC    Outcome: Progressing     Problem: SAFETY ADULT  Goal: Patient will remain free of falls  Description  INTERVENTIONS:  - Assess patient frequently for physical needs  -  Identify cognitive and physical deficits and behaviors that affect risk of falls       Outcome: Progressing  Goal: Maintain or return to baseline ADL function  Description  INTERVENTIONS:  -  Assess patient's ability to carry out ADLs; assess patient's baseline for ADL function and identify physical deficits which impact ability to perform ADLs (bathing, care of mouth/teeth, toileting, grooming, dressing, etc )  - Assess patient's mobility; develop plan if impaired         Outcome: Progressing  Goal: Maintain or return mobility status to optimal level  Description  INTERVENTIONS:  - Assess patient's baseline mobility status (ambulation, transfers, stairs, etc )         Outcome: Progressing     Problem: Knowledge Deficit  Goal: Patient/family/caregiver demonstrates understanding of disease process, treatment plan, medications, and discharge instructions  Description  Complete learning assessment and assess knowledge base    Interventions:  - Provide teaching at level of understanding  - Provide teaching via preferred learning methods  Outcome: Progressing     Problem: DISCHARGE PLANNING  Goal: Discharge to home or other facility with appropriate resources  Description  INTERVENTIONS:  - Identify barriers to discharge w/patient and caregiver  - Arrange for needed discharge resources and transportation as appropriate  - Identify discharge learning needs (meds, wound care, etc )  - Refer to Case Management Department for coordinating discharge planning if the patient needs post-hospital services based on physician/advanced practitioner order or complex needs related to functional status, cognitive ability, or social support system   Outcome: Progressing     Problem: POSTPARTUM  Goal: Experiences normal postpartum course  Description  INTERVENTIONS:  - Monitor maternal vital signs  - Assess uterine involution and lochia  Outcome: Progressing  Goal: Appropriate maternal -  bonding  Description  INTERVENTIONS:  - Identify family support  - Assess for appropriate maternal/infant bonding   -Encourage maternal/infant bonding opportunities  - Referral to  or  as needed  Outcome: Progressing  Goal: Establishment of infant feeding pattern  Description  INTERVENTIONS:  - Assess breast/bottle feeding  - Refer to lactation as needed  Outcome: Progressing  Goal: Incision(s), wounds(s) or drain site(s) healing without S/S of infection  Description  INTERVENTIONS  - Assess and document risk factors for skin impairment   - Assess and document dressing, incision, wound bed, drain sites and surrounding tissue  - Consider nutrition services referral as needed  - Oral mucous membranes remain intact  - Provide patient/ family education  Outcome: Progressing

## 2019-09-04 NOTE — PROGRESS NOTES
Progress Note - OB/GYN  Philly Dwyer 34 y o  female MRN: [id number]  Unit/Bed#: -01 Encounter: [id number]      Assessment:  Postop Day #1 s/p RLTCS, stable, baby in the room    Plan:    1) Postoperative care  -Hgb 9 6g/dL --> 7 8  -Rai out, f/u void trial  -Encourage ambulation  -Encourage breastfeeding    2) Gestational HTN  -No severe range pressures, highest /85 on 09/03 at 2212    3) Asthma  -Albuterol prn    4) Rh negative  -RHIG/Rhogam ordered    5) Anticipate discharge 09/05    Chief Complaint:    Post delivery  Patient is doing well  Lochia WNL  Pain well controlled  Subjective    Pain: well controlled  Tolerating PO: yes  Voiding: yes, rai out   Flatus: yes  BM: no  Ambulating: no  Breastfeeding:  yes  Chest pain: no  Shortness of breath: no  Leg pain: no  Lochia: minimal     Vitals:   /76 (BP Location: Right arm)   Pulse 73   Temp 98 2 °F (36 8 °C) (Oral)   Resp 16   Ht 5' 2" (1 575 m)   Wt 90 7 kg (200 lb)   LMP 12/11/2018 (Approximate)   SpO2 99%   Breastfeeding?  Yes   BMI 36 58 kg/m²       Intake/Output Summary (Last 24 hours) at 9/4/2019 0703  Last data filed at 9/4/2019 0600  Gross per 24 hour   Intake 1400 ml   Output 2049 ml   Net -649 ml       Invasive Devices     Peripheral Intravenous Line            Peripheral IV 09/03/19 Left Forearm less than 1 day    Peripheral IV 09/03/19 Right Wrist less than 1 day                Physical Exam:   GEN: Philly Dwyer appears well, alert and oriented x 3, pleasant and cooperative   ABDOMEN: soft, no tenderness, no distention, fundus firm at umbilicus, Incision C/D/I  EXTREMITIES: SCDs on      Labs:   Admission on 09/03/2019   Component Date Value    WBC 09/03/2019 9 21     RBC 09/03/2019 3 49*    Hemoglobin 09/03/2019 9 6*    Hematocrit 09/03/2019 29 4*    MCV 09/03/2019 84     MCH 09/03/2019 27 5     MCHC 09/03/2019 32 7     RDW 09/03/2019 13 9     Platelets 23/96/0505 192     MPV 09/03/2019 10 8     ABO Grouping 09/03/2019 O     Rh Factor 09/03/2019 Negative     Antibody Screen 09/03/2019 Positive     Specimen Expiration Date 09/03/2019 [id number]     Creatinine, Ur 09/03/2019 46 3     Protein Urine Random 09/03/2019 9     Prot/Creat Ratio, Ur 09/03/2019 0 19*    Color, UA 09/03/2019 Yellow     Clarity, UA 09/03/2019 Clear     Specific Gravity, UA 09/03/2019 1 004     pH, UA 09/03/2019 7 5     Leukocytes, UA 09/03/2019 Negative     Nitrite, UA 09/03/2019 Negative     Protein, UA 09/03/2019 Negative     Glucose, UA 09/03/2019 Negative     Ketones, UA 09/03/2019 Negative     Urobilinogen, UA 09/03/2019 0 2     Bilirubin, UA 09/03/2019 Negative     Blood, UA 09/03/2019 Negative     Sodium 09/03/2019 142     Potassium 09/03/2019 3 6     Chloride 09/03/2019 110*    CO2 09/03/2019 21     ANION GAP 09/03/2019 11     BUN 09/03/2019 4*    Creatinine 09/03/2019 0 64     Glucose 09/03/2019 68     Calcium 09/03/2019 9 4     AST 09/03/2019 15     ALT 09/03/2019 18     Alkaline Phosphatase 09/03/2019 120*    Total Protein 09/03/2019 6 5     Albumin 09/03/2019 3 0*    Total Bilirubin 09/03/2019 0 26     eGFR 09/03/2019 121     Extra Tube 09/03/2019 Hold for add-ons   ANTIBODY ID  #1 09/03/2019 Passive D Antibody, Patient Received RHIG     pH, Cord Castillo 09/03/2019 7  357     pCO2, Cord Castillo 09/03/2019 38 2     pO2, Cord Castillo 09/03/2019 28 7     HCO3, Cord Castillo 09/03/2019 21 0    [facility] Exc, Cord Castillo 09/03/2019 -4 0*    O2 Cont, Cord Castillo 09/03/2019 16 5     O2 HGB,VENOUS CORD 09/03/2019 70 5     Amph/Meth UR 09/03/2019 Negative     Barbiturate Ur 09/03/2019 Negative     Benzodiazepine Urine 09/03/2019 Negative     Cocaine Urine 09/03/2019 Negative     Methadone Urine 09/03/2019 Negative     Opiate Urine 09/03/2019 Positive*    PCP Ur 09/03/2019 Negative     THC Urine 09/03/2019 Negative     WBC 09/04/2019 15 87*    RBC 09/04/2019 2 85*    Hemoglobin 09/04/2019 7 8*    Hematocrit 2019 24 6*    MCV 2019 86     MCH 2019 27 4     MCHC 2019 31 7     RDW 2019 13 6     MPV 2019 10 7     Platelets  174     nRBC 2019 0     Neutrophils Relative 2019 86*    Immat GRANS % 2019 1     Lymphocytes Relative 2019 8*    Monocytes Relative 2019 5     Eosinophils Relative 2019 0     Basophils Relative 2019 0     Neutrophils Absolute 2019 13 66*    Immature Grans Absolute 2019 0 13     Lymphocytes Absolute 2019 1 22     Monocytes Absolute 2019 0 85     Eosinophils Absolute 2019 0 00     Basophils Absolute 2019 0 01          Patient Active Problem List   Diagnosis    38 weeks gestation of pregnancy    Maternal care for scar from previous  delivery    Asthma complicating pregnancy in third trimester    Constipation during pregnancy in second trimester    GBS bacteriuria    Rh negative status in gallo pregnancy in second trimester    Anxiety    Abnormal glucose tolerance test (GTT) during pregnancy, antepartum    Anemia during pregnancy in third trimester    Obesity affecting pregnancy, antepartum    Gestational HTN, third trimester    S/P repeat low transverse     Status post bilateral salpingectomy          Matt Moore MD  2019  7:03 AM

## 2019-09-04 NOTE — ANESTHESIA POSTPROCEDURE EVALUATION
Post-Op Assessment Note    CV Status:  Stable  Pain Score: 0    Pain management: adequate     Mental Status:  Alert   Hydration Status:  Stable   PONV Controlled:  Controlled   Airway Patency:  Patent    Staff: Anesthesiologist     Post-op block assessment: no complications        BP      Temp      Pulse     Resp      SpO2

## 2019-09-05 PROCEDURE — 99024 POSTOP FOLLOW-UP VISIT: CPT | Performed by: OBSTETRICS & GYNECOLOGY

## 2019-09-05 RX ADMIN — DOCUSATE SODIUM 100 MG: 100 CAPSULE, LIQUID FILLED ORAL at 16:47

## 2019-09-05 RX ADMIN — FERROUS SULFATE TAB 325 MG (65 MG ELEMENTAL FE) 325 MG: 325 (65 FE) TAB at 07:43

## 2019-09-05 RX ADMIN — OXYCODONE HYDROCHLORIDE AND ACETAMINOPHEN 2 TABLET: 5; 325 TABLET ORAL at 13:04

## 2019-09-05 RX ADMIN — IBUPROFEN 600 MG: 600 TABLET, FILM COATED ORAL at 16:47

## 2019-09-05 RX ADMIN — IBUPROFEN 600 MG: 600 TABLET, FILM COATED ORAL at 09:57

## 2019-09-05 RX ADMIN — PRENATAL VIT W/ FE FUMARATE-FA TAB 27-0.8 MG 1 TABLET: 27-0.8 TAB at 07:42

## 2019-09-05 RX ADMIN — SIMETHICONE CHEW TAB 80 MG 80 MG: 80 TABLET ORAL at 22:42

## 2019-09-05 RX ADMIN — OXYCODONE HYDROCHLORIDE AND ACETAMINOPHEN 2 TABLET: 5; 325 TABLET ORAL at 06:42

## 2019-09-05 RX ADMIN — DOCUSATE SODIUM 100 MG: 100 CAPSULE, LIQUID FILLED ORAL at 07:43

## 2019-09-05 RX ADMIN — OXYCODONE HYDROCHLORIDE AND ACETAMINOPHEN 1 TABLET: 5; 325 TABLET ORAL at 00:28

## 2019-09-05 RX ADMIN — OXYCODONE HYDROCHLORIDE AND ACETAMINOPHEN 2 TABLET: 5; 325 TABLET ORAL at 18:25

## 2019-09-05 RX ADMIN — OXYCODONE HYDROCHLORIDE AND ACETAMINOPHEN 2 TABLET: 5; 325 TABLET ORAL at 22:42

## 2019-09-05 NOTE — PROGRESS NOTES
Progress Note - OB/GYN  Philly Dwyer 34 y o  female MRN: [id number]  Unit/Bed#:  323-01 Encounter: [id number]      Assessment:  Postop Day #2 s/p RLTCS, stable, baby in the room     Plan:     1) Postoperative care  -Hgb 9 6g/dL --> 7 8  -Patient voiding adequately   -Encourage ambulation  -Encourage breastfeeding     2) Gestational HTN  -No severe range pressures, highest /81 09/04 at 2040     3) Asthma  -Albuterol prn     4) Rh negative  -RHIG/Rhogam ordered, given yesterday     5) Anticipate discharge 09/06    Chief Complaint:    Post delivery  Patient is doing well  Lochia WNL  Pain well controlled  Subjective    Pain: well controlled  Tolerating PO: yes  Voiding: yes  Flatus: yes  BM: no  Ambulating: yes  Breastfeeding:  yes  Chest pain: no  Shortness of breath: no  Leg pain: no  Lochia: minimal     Vitals:   /65   Pulse 76   Temp 97 7 °F (36 5 °C) (Oral)   Resp 16   Ht 5' 2" (1 575 m)   Wt 90 7 kg (200 lb)   LMP 12/11/2018 (Approximate)   SpO2 99%   Breastfeeding?  Yes   BMI 36 58 kg/m²       Intake/Output Summary (Last 24 hours) at 9/5/2019 0651  Last data filed at 9/5/2019 0600  Gross per 24 hour   Intake 3288 83 ml   Output 500 ml   Net 2788 83 ml       Invasive Devices     None                 Physical Exam:   GEN: Philly Dwyer appears well, alert and oriented x 3, pleasant and cooperative   ABDOMEN: soft, no tenderness, no distention, fundus firm 1cm below umbilicus, Incision C/D/I  EXTREMITIES: SCDs off, no tenderness      Labs:   Admission on 09/03/2019   Component Date Value    WBC 09/03/2019 9 21     RBC 09/03/2019 3 49*    Hemoglobin 09/03/2019 9 6*    Hematocrit 09/03/2019 29 4*    MCV 09/03/2019 84     MCH 09/03/2019 27 5     MCHC 09/03/2019 32 7     RDW 09/03/2019 13 9     Platelets 76/35/2322 192     MPV 09/03/2019 10 8     RPR 09/03/2019 Non-Reactive     ABO Grouping 09/03/2019 O     Rh Factor 09/03/2019 Negative     Antibody Screen 09/03/2019 Positive     Specimen Expiration Date 09/03/2019 [id number]     Creatinine, Ur 09/03/2019 46 3     Protein Urine Random 09/03/2019 9     Prot/Creat Ratio, Ur 09/03/2019 0 19*    Color, UA 09/03/2019 Yellow     Clarity, UA 09/03/2019 Clear     Specific Gravity, UA 09/03/2019 1 004     pH, UA 09/03/2019 7 5     Leukocytes, UA 09/03/2019 Negative     Nitrite, UA 09/03/2019 Negative     Protein, UA 09/03/2019 Negative     Glucose, UA 09/03/2019 Negative     Ketones, UA 09/03/2019 Negative     Urobilinogen, UA 09/03/2019 0 2     Bilirubin, UA 09/03/2019 Negative     Blood, UA 09/03/2019 Negative     Sodium 09/03/2019 142     Potassium 09/03/2019 3 6     Chloride 09/03/2019 110*    CO2 09/03/2019 21     ANION GAP 09/03/2019 11     BUN 09/03/2019 4*    Creatinine 09/03/2019 0 64     Glucose 09/03/2019 68     Calcium 09/03/2019 9 4     AST 09/03/2019 15     ALT 09/03/2019 18     Alkaline Phosphatase 09/03/2019 120*    Total Protein 09/03/2019 6 5     Albumin 09/03/2019 3 0*    Total Bilirubin 09/03/2019 0 26     eGFR 09/03/2019 121     Extra Tube 09/03/2019 Hold for add-ons   ANTIBODY ID  #1 09/03/2019 Passive D Antibody, Patient Received RHIG     pH, Cord Castillo 09/03/2019 7  357     pCO2, Cord Castillo 09/03/2019 38 2     pO2, Cord Castillo 09/03/2019 28 7     HCO3, Cord Castillo 09/03/2019 21 0    [facility] Exc, Cord Castillo 09/03/2019 -4 0*    O2 Cont, Cord Castillo 09/03/2019 16 5     O2 HGB,VENOUS CORD 09/03/2019 70 5     Amph/Meth UR 09/03/2019 Negative     Barbiturate Ur 09/03/2019 Negative     Benzodiazepine Urine 09/03/2019 Negative     Cocaine Urine 09/03/2019 Negative     Methadone Urine 09/03/2019 Negative     Opiate Urine 09/03/2019 Positive*    PCP Ur 09/03/2019 Negative     THC Urine 09/03/2019 Negative     ABO Grouping 09/04/2019 O     Rh Factor 09/04/2019 Negative     Antibody Screen 09/04/2019 Positive     Fetal Bleed Screen 09/04/2019 Negative     WBC 09/04/2019 15 87*    RBC 2019 2 85*    Hemoglobin 2019 7 8*    Hematocrit 2019 24 6*    MCV 2019 86     MCH 2019 27 4     MCHC 2019 31 7     RDW 2019 13 6     MPV 2019 10 7     Platelets  174     nRBC 2019 0     Neutrophils Relative 2019 86*    Immat GRANS % 2019 1     Lymphocytes Relative 2019 8*    Monocytes Relative 2019 5     Eosinophils Relative 2019 0     Basophils Relative 2019 0     Neutrophils Absolute 2019 13 66*    Immature Grans Absolute 2019 0 13     Lymphocytes Absolute 2019 1 22     Monocytes Absolute 2019 0 85     Eosinophils Absolute 2019 0 00     Basophils Absolute 2019 0 01     Sodium 2019 141     Potassium 2019 3 5     Chloride 2019 109*    CO2 2019 24     ANION GAP 2019 8     BUN 2019 5     Creatinine 2019 0 57*    Glucose 2019 96     Calcium 2019 8 7     AST 2019 14     ALT 2019 12     Alkaline Phosphatase 2019 87     Total Protein 2019 5 5*    Albumin 2019 2 2*    Total Bilirubin 2019 0 23     eGFR 2019 126          Patient Active Problem List   Diagnosis    38 weeks gestation of pregnancy    Maternal care for scar from previous  delivery    Asthma complicating pregnancy in third trimester    Constipation during pregnancy in second trimester    GBS bacteriuria    Rh negative status in agllo pregnancy in second trimester    Anxiety    Abnormal glucose tolerance test (GTT) during pregnancy, antepartum    Anemia during pregnancy in third trimester    Obesity affecting pregnancy, antepartum    Gestational HTN, third trimester    S/P repeat low transverse     Status post bilateral salpingectomy          Ashia Aguilera MD  2019  6:51 AM

## 2019-09-05 NOTE — PLAN OF CARE
Problem: PAIN - ADULT  Goal: Verbalizes/displays adequate comfort level or baseline comfort level  Description  Interventions:  - Encourage patient to monitor pain and request assistance  - Assess pain using appropriate pain scale  - Administer analgesics based on type and severity of pain and evaluate response  - Implement non-pharmacological measures as appropriate and evaluate response  - Consider cultural and social influences on pain and pain management  - Notify physician/advanced practitioner if interventions unsuccessful or patient reports new pain  Outcome: Progressing     Problem: INFECTION - ADULT  Goal: Absence or prevention of progression during hospitalization  Description  INTERVENTIONS:  - Assess and monitor for signs and symptoms of infection  - Monitor lab/diagnostic results  - Monitor all insertion sites, i e  indwelling lines, tubes, and drains  - Instruct and encourage patient and family to use good hand hygiene technique     Outcome: Progressing  Goal: Absence of fever/infection during neutropenic period  Description  INTERVENTIONS:  - Monitor WBC    Outcome: Progressing     Problem: SAFETY ADULT  Goal: Patient will remain free of falls  Description  INTERVENTIONS:  - Assess patient frequently for physical needs  -  Identify cognitive and physical deficits and behaviors that affect risk of falls       Outcome: Progressing  Goal: Maintain or return to baseline ADL function  Description  INTERVENTIONS:  -  Assess patient's ability to carry out ADLs; assess patient's baseline for ADL function and identify physical deficits which impact ability to perform ADLs (bathing, care of mouth/teeth, toileting, grooming, dressing, etc )  - Assess patient's mobility; develop plan if impaired         Outcome: Progressing  Goal: Maintain or return mobility status to optimal level  Description  INTERVENTIONS:  - Assess patient's baseline mobility status (ambulation, transfers, stairs, etc )         Outcome: Progressing     Problem: Knowledge Deficit  Goal: Patient/family/caregiver demonstrates understanding of disease process, treatment plan, medications, and discharge instructions  Description  Complete learning assessment and assess knowledge base    Interventions:  - Provide teaching at level of understanding  - Provide teaching via preferred learning methods  Outcome: Progressing     Problem: DISCHARGE PLANNING  Goal: Discharge to home or other facility with appropriate resources  Description  INTERVENTIONS:  - Identify barriers to discharge w/patient and caregiver  - Arrange for needed discharge resources and transportation as appropriate  - Identify discharge learning needs (meds, wound care, etc )  - Refer to Case Management Department for coordinating discharge planning if the patient needs post-hospital services based on physician/advanced practitioner order or complex needs related to functional status, cognitive ability, or social support system   Outcome: Progressing     Problem: POSTPARTUM  Goal: Experiences normal postpartum course  Description  INTERVENTIONS:  - Monitor maternal vital signs  - Assess uterine involution and lochia  Outcome: Progressing  Goal: Appropriate maternal -  bonding  Description  INTERVENTIONS:  - Identify family support  - Assess for appropriate maternal/infant bonding   -Encourage maternal/infant bonding opportunities  - Referral to  or  as needed  Outcome: Progressing  Goal: Establishment of infant feeding pattern  Description  INTERVENTIONS:  - Assess breast/bottle feeding  - Refer to lactation as needed  Outcome: Progressing  Goal: Incision(s), wounds(s) or drain site(s) healing without S/S of infection  Description  INTERVENTIONS  - Assess and document risk factors for skin impairment   - Assess and document dressing, incision, wound bed, drain sites and surrounding tissue  - Consider nutrition services referral as needed  - Oral mucous membranes remain intact  - Provide patient/ family education  Outcome: Progressing     Problem: ALTERATION IN THE BREAST  Goal: Optimize infant feeding at the breast  Description  INTERVENTIONS:  - Latch, breast and nipple assessment  - Assess prior breast feeding history  - Hand expression of breast milk  - For cracked, bleeding and or sore nipples reassess latch, treat damaged nipple  -Educate mother on feeding cues  -Positioning/latch techniques  Outcome: Progressing     Problem: INADEQUATE LATCH, SUCK OR SWALLOW  Goal: Demonstrate ability to latch and sustain latch, audible swallowing and satiety  Description  INTERVENTIONS:  - Assess oral anatomy, notify Physician/AP for abnormal findings  - Establish milk expression  - Maximize feeding opportunity (skin to skin, behavioral state)  - Position/latch techniques  - Discourage use of pacifier-artificial nipple  - Mechanical pumping  - Nipple Shield  - Supplemental formula feeding (Physician/AP order)  - Alternative feeding method  Outcome: Progressing

## 2019-09-06 VITALS
TEMPERATURE: 97.8 F | HEART RATE: 87 BPM | SYSTOLIC BLOOD PRESSURE: 138 MMHG | HEIGHT: 62 IN | RESPIRATION RATE: 17 BRPM | DIASTOLIC BLOOD PRESSURE: 76 MMHG | WEIGHT: 200 LBS | BODY MASS INDEX: 36.8 KG/M2 | OXYGEN SATURATION: 97 %

## 2019-09-06 PROBLEM — Z3A.38 38 WEEKS GESTATION OF PREGNANCY: Status: ACTIVE | Noted: 2019-09-06

## 2019-09-06 PROCEDURE — 99024 POSTOP FOLLOW-UP VISIT: CPT | Performed by: OBSTETRICS & GYNECOLOGY

## 2019-09-06 RX ORDER — IBUPROFEN 600 MG/1
600 TABLET ORAL EVERY 6 HOURS PRN
Qty: 60 TABLET | Refills: 3 | Status: SHIPPED | OUTPATIENT
Start: 2019-09-06

## 2019-09-06 RX ORDER — SIMETHICONE 80 MG
80 TABLET,CHEWABLE ORAL EVERY 6 HOURS PRN
Qty: 30 TABLET | Refills: 0
Start: 2019-09-06 | End: 2019-10-01 | Stop reason: ALTCHOICE

## 2019-09-06 RX ORDER — ACETAMINOPHEN 325 MG/1
650 TABLET ORAL EVERY 4 HOURS PRN
Qty: 60 TABLET | Refills: 3 | Status: SHIPPED | OUTPATIENT
Start: 2019-09-06 | End: 2019-10-01 | Stop reason: ALTCHOICE

## 2019-09-06 RX ORDER — OXYCODONE HYDROCHLORIDE 5 MG/1
5-10 TABLET ORAL EVERY 4 HOURS PRN
Qty: 15 TABLET | Refills: 0 | Status: SHIPPED | OUTPATIENT
Start: 2019-09-06 | End: 2019-09-16

## 2019-09-06 RX ADMIN — OXYCODONE HYDROCHLORIDE AND ACETAMINOPHEN 2 TABLET: 5; 325 TABLET ORAL at 02:47

## 2019-09-06 RX ADMIN — OXYCODONE HYDROCHLORIDE AND ACETAMINOPHEN 2 TABLET: 5; 325 TABLET ORAL at 11:49

## 2019-09-06 RX ADMIN — IBUPROFEN 600 MG: 600 TABLET, FILM COATED ORAL at 14:58

## 2019-09-06 RX ADMIN — FERROUS SULFATE TAB 325 MG (65 MG ELEMENTAL FE) 325 MG: 325 (65 FE) TAB at 06:33

## 2019-09-06 RX ADMIN — OXYCODONE HYDROCHLORIDE AND ACETAMINOPHEN 2 TABLET: 5; 325 TABLET ORAL at 06:38

## 2019-09-06 RX ADMIN — DOCUSATE SODIUM 100 MG: 100 CAPSULE, LIQUID FILLED ORAL at 08:19

## 2019-09-06 RX ADMIN — IBUPROFEN 600 MG: 600 TABLET, FILM COATED ORAL at 01:00

## 2019-09-06 RX ADMIN — IBUPROFEN 600 MG: 600 TABLET, FILM COATED ORAL at 08:21

## 2019-09-06 RX ADMIN — PRENATAL VIT W/ FE FUMARATE-FA TAB 27-0.8 MG 1 TABLET: 27-0.8 TAB at 08:19

## 2019-09-06 NOTE — LACTATION NOTE
This note was copied from a baby's chart  Nicolette Rajan was dismissive when attempting to give discharge information  Attempted to express that infant is at 9 7% weight loss  "I have a doctor's appointment tomorrow with the pediatricaian and on Monday and Tuesday  What am I going to do  She'll be fine " Offered suggestion that she may begin pumping to supplement what infant is doing at the breast  "Am I supposed to take that paper home? Just put it on the pile of papers "    Met with mother to go over feeding log since birth for the first week  Emphasized 8 or more (12) feedings in a 24 hour period, what to expect for the number of diapers per day of life and the progression of properties of the  stooling pattern  Discussed s/s that breastfeeding is going well after day 4 and when to get help from a pediatrician or lactation support person after day 4  Booklet included Breast Pumping Instructions, When You Go Back to Work or School, and Breastfeeding Resources for after discharge including access to the number for the [employer]  Encouraged parents to call for assistance, questions, and concerns about breastfeeding  Extension provided

## 2019-09-06 NOTE — PROGRESS NOTES
Progress Note - OB/GYN  Vlad Hoover 34 y o  female MRN: [id number]  Unit/Bed#: -01 Encounter: [id number]      Assessment:  Postop Day #3 s/p RLTCS, stable, baby in the room     Plan:     1) Postoperative care  -Hgb 9 6g/dL --> 7 8  -Patient voiding adequately   -Encourage ambulation  -Encourage breastfeeding     2) Gestational HTN  -No severe range pressures, highest /81 09/04 at 2040     3) Asthma  -Albuterol prn     4) Rh negative  -Rh negative, rhogam given 09/04     5) Anticipate discharge today, 09/06     Chief Complaint:    Post delivery  Patient is doing well  Lochia WNL  Pain well controlled  Subjective    Pain: well controlled  Tolerating PO: yes  Voiding: yes  Flatus: yes  BM: no  Ambulating: yes  Breastfeeding:  yes  Chest pain: no  Shortness of breath: no  Leg pain: no  Lochia: minimal    Vitals:   /76 (BP Location: Right arm)   Pulse 87   Temp 97 8 °F (36 6 °C) (Oral)   Resp 17   Ht 5' 2" (1 575 m)   Wt 90 7 kg (200 lb)   LMP 12/11/2018 (Approximate)   SpO2 97%   Breastfeeding?  Yes   BMI 36 58 kg/m²     No intake or output data in the 24 hours ending 09/06/19 0725    Invasive Devices     None                 Physical Exam:   GEN: Vlad Hoover appears well, alert and oriented x 3, pleasant and cooperative   ABDOMEN: soft, no tenderness, no distention, fundus firm, 2cm below umbilicus, Incision C/D/I  EXTREMITIES: SCDs off, no calf pain      Labs:   Admission on 09/03/2019   Component Date Value    WBC 09/03/2019 9 21     RBC 09/03/2019 3 49*    Hemoglobin 09/03/2019 9 6*    Hematocrit 09/03/2019 29 4*    MCV 09/03/2019 84     MCH 09/03/2019 27 5     MCHC 09/03/2019 32 7     RDW 09/03/2019 13 9     Platelets 41/81/3878 192     MPV 09/03/2019 10 8     RPR 09/03/2019 Non-Reactive     ABO Grouping 09/03/2019 O     Rh Factor 09/03/2019 Negative     Antibody Screen 09/03/2019 Positive     Specimen Expiration Date 09/03/2019 [id number]     Creatinine, Ur 09/03/2019 46 3     Protein Urine Random 09/03/2019 9     Prot/Creat Ratio, Ur 09/03/2019 0 19*    Color, UA 09/03/2019 Yellow     Clarity, UA 09/03/2019 Clear     Specific Gravity, UA 09/03/2019 1 004     pH, UA 09/03/2019 7 5     Leukocytes, UA 09/03/2019 Negative     Nitrite, UA 09/03/2019 Negative     Protein, UA 09/03/2019 Negative     Glucose, UA 09/03/2019 Negative     Ketones, UA 09/03/2019 Negative     Urobilinogen, UA 09/03/2019 0 2     Bilirubin, UA 09/03/2019 Negative     Blood, UA 09/03/2019 Negative     Sodium 09/03/2019 142     Potassium 09/03/2019 3 6     Chloride 09/03/2019 110*    CO2 09/03/2019 21     ANION GAP 09/03/2019 11     BUN 09/03/2019 4*    Creatinine 09/03/2019 0 64     Glucose 09/03/2019 68     Calcium 09/03/2019 9 4     AST 09/03/2019 15     ALT 09/03/2019 18     Alkaline Phosphatase 09/03/2019 120*    Total Protein 09/03/2019 6 5     Albumin 09/03/2019 3 0*    Total Bilirubin 09/03/2019 0 26     eGFR 09/03/2019 121     Extra Tube 09/03/2019 Hold for add-ons   ANTIBODY ID  #1 09/03/2019 Passive D Antibody, Patient Received RHIG     pH, Cord Castillo 09/03/2019 7  357     pCO2, Cord Castillo 09/03/2019 38 2     pO2, Cord Castillo 09/03/2019 28 7     HCO3, Cord Castillo 09/03/2019 21 0    [facility] Exc, Cord Castillo 09/03/2019 -4 0*    O2 Cont, Cord Castillo 09/03/2019 16 5     O2 HGB,VENOUS CORD 09/03/2019 70 5     Amph/Meth UR 09/03/2019 Negative     Barbiturate Ur 09/03/2019 Negative     Benzodiazepine Urine 09/03/2019 Negative     Cocaine Urine 09/03/2019 Negative     Methadone Urine 09/03/2019 Negative     Opiate Urine 09/03/2019 Positive*    PCP Ur 09/03/2019 Negative     THC Urine 09/03/2019 Negative     ABO Grouping 09/04/2019 O     Rh Factor 09/04/2019 Negative     Antibody Screen 09/04/2019 Positive     Fetal Bleed Screen 09/04/2019 Negative     WBC 09/04/2019 15 87*    RBC 09/04/2019 2 85*    Hemoglobin 09/04/2019 7 8*    Hematocrit 09/04/2019 24 6*    MCV 2019 86     MCH 2019 27 4     MCHC 2019 31 7     RDW 2019 13 6     MPV 2019 10 7     Platelets  174     nRBC 2019 0     Neutrophils Relative 2019 86*    Immat GRANS % 2019 1     Lymphocytes Relative 2019 8*    Monocytes Relative 2019 5     Eosinophils Relative 2019 0     Basophils Relative 2019 0     Neutrophils Absolute 2019 13 66*    Immature Grans Absolute 2019 0 13     Lymphocytes Absolute 2019 1 22     Monocytes Absolute 2019 0 85     Eosinophils Absolute 2019 0 00     Basophils Absolute 2019 0 01     Sodium 2019 141     Potassium 2019 3 5     Chloride 2019 109*    CO2 2019 24     ANION GAP 2019 8     BUN 2019 5     Creatinine 2019 0 57*    Glucose 2019 96     Calcium 2019 8 7     AST 2019 14     ALT 2019 12     Alkaline Phosphatase 2019 87     Total Protein 2019 5 5*    Albumin 2019 2 2*    Total Bilirubin 2019 0 23     eGFR 2019 126          Patient Active Problem List   Diagnosis    38 weeks gestation of pregnancy    Maternal care for scar from previous  delivery    Asthma complicating pregnancy in third trimester    Constipation during pregnancy in second trimester    GBS bacteriuria    Rh negative status in gallo pregnancy in second trimester    Anxiety    Abnormal glucose tolerance test (GTT) during pregnancy, antepartum    Anemia during pregnancy in third trimester    Obesity affecting pregnancy, antepartum    Gestational HTN, third trimester    S/P repeat low transverse     Status post bilateral salpingectomy          Tacho Rodriguez MD  2019  7:25 AM

## 2019-09-06 NOTE — PLAN OF CARE
Problem: PAIN - ADULT  Goal: Verbalizes/displays adequate comfort level or baseline comfort level  Description  Interventions:  - Encourage patient to monitor pain and request assistance  - Assess pain using appropriate pain scale  - Administer analgesics based on type and severity of pain and evaluate response  - Implement non-pharmacological measures as appropriate and evaluate response  - Consider cultural and social influences on pain and pain management  - Notify physician/advanced practitioner if interventions unsuccessful or patient reports new pain  Outcome: Completed     Problem: INFECTION - ADULT  Goal: Absence or prevention of progression during hospitalization  Description  INTERVENTIONS:  - Assess and monitor for signs and symptoms of infection  - Monitor lab/diagnostic results  - Monitor all insertion sites, i e  indwelling lines, tubes, and drains  - Instruct and encourage patient and family to use good hand hygiene technique     Outcome: Completed  Goal: Absence of fever/infection during neutropenic period  Description  INTERVENTIONS:  - Monitor WBC    Outcome: Completed     Problem: SAFETY ADULT  Goal: Patient will remain free of falls  Description  INTERVENTIONS:  - Assess patient frequently for physical needs  -  Identify cognitive and physical deficits and behaviors that affect risk of falls       Outcome: Completed  Goal: Maintain or return to baseline ADL function  Description  INTERVENTIONS:  -  Assess patient's ability to carry out ADLs; assess patient's baseline for ADL function and identify physical deficits which impact ability to perform ADLs (bathing, care of mouth/teeth, toileting, grooming, dressing, etc )  - Assess patient's mobility; develop plan if impaired         Outcome: Completed  Goal: Maintain or return mobility status to optimal level  Description  INTERVENTIONS:  - Assess patient's baseline mobility status (ambulation, transfers, stairs, etc )         Outcome: Completed Problem: Knowledge Deficit  Goal: Patient/family/caregiver demonstrates understanding of disease process, treatment plan, medications, and discharge instructions  Description  Complete learning assessment and assess knowledge base    Interventions:  - Provide teaching at level of understanding  - Provide teaching via preferred learning methods  Outcome: Completed     Problem: DISCHARGE PLANNING  Goal: Discharge to home or other facility with appropriate resources  Description  INTERVENTIONS:  - Identify barriers to discharge w/patient and caregiver  - Arrange for needed discharge resources and transportation as appropriate  - Identify discharge learning needs (meds, wound care, etc )  - Refer to Case Management Department for coordinating discharge planning if the patient needs post-hospital services based on physician/advanced practitioner order or complex needs related to functional status, cognitive ability, or social support system   Outcome: Completed     Problem: POSTPARTUM  Goal: Experiences normal postpartum course  Description  INTERVENTIONS:  - Monitor maternal vital signs  - Assess uterine involution and lochia  Outcome: Completed  Goal: Appropriate maternal -  bonding  Description  INTERVENTIONS:  - Identify family support  - Assess for appropriate maternal/infant bonding   -Encourage maternal/infant bonding opportunities  - Referral to  or  as needed  Outcome: Completed  Goal: Establishment of infant feeding pattern  Description  INTERVENTIONS:  - Assess breast/bottle feeding  - Refer to lactation as needed  Outcome: Completed  Goal: Incision(s), wounds(s) or drain site(s) healing without S/S of infection  Description  INTERVENTIONS  - Assess and document risk factors for skin impairment   - Assess and document dressing, incision, wound bed, drain sites and surrounding tissue  - Consider nutrition services referral as needed  - Oral mucous membranes remain intact  - Provide patient/ family education  Outcome: Completed     Problem: ALTERATION IN THE BREAST  Goal: Optimize infant feeding at the breast  Description  INTERVENTIONS:  - Latch, breast and nipple assessment  - Assess prior breast feeding history  - Hand expression of breast milk  - For cracked, bleeding and or sore nipples reassess latch, treat damaged nipple  -Educate mother on feeding cues  -Positioning/latch techniques  Outcome: Completed     Problem: INADEQUATE LATCH, SUCK OR SWALLOW  Goal: Demonstrate ability to latch and sustain latch, audible swallowing and satiety  Description  INTERVENTIONS:  - Assess oral anatomy, notify Physician/AP for abnormal findings  - Establish milk expression  - Maximize feeding opportunity (skin to skin, behavioral state)  - Position/latch techniques  - Discourage use of pacifier-artificial nipple  - Mechanical pumping  - Nipple Shield  - Supplemental formula feeding (Physician/AP order)  - Alternative feeding method  Outcome: Completed

## 2019-09-09 ENCOUNTER — TELEPHONE (OUTPATIENT)
Dept: OBGYN CLINIC | Facility: CLINIC | Age: 29
End: 2019-09-09

## 2019-09-09 ENCOUNTER — HOSPITAL ENCOUNTER (INPATIENT)
Facility: HOSPITAL | Age: 29
LOS: 1 days | Discharge: HOME | DRG: 561 | End: 2019-09-10
Attending: EMERGENCY MEDICINE | Admitting: ANESTHESIOLOGY
Payer: COMMERCIAL

## 2019-09-09 DIAGNOSIS — O14.90 PRE-ECLAMPSIA: Primary | ICD-10-CM

## 2019-09-09 LAB
ALBUMIN SERPL BCP-MCNC: 2.6 G/DL (ref 3.5–5)
ALP SERPL-CCNC: 103 U/L (ref 46–116)
ALT SERPL W P-5'-P-CCNC: 27 U/L (ref 12–78)
ANION GAP SERPL CALCULATED.3IONS-SCNC: 10 MMOL/L (ref 4–13)
ANISOCYTOSIS BLD QL SMEAR: PRESENT
APTT PPP: 31 SECONDS (ref 23–37)
APTT PPP: 31 SECONDS (ref 23–37)
AST SERPL W P-5'-P-CCNC: 19 U/L (ref 5–45)
BACTERIA UR QL AUTO: ABNORMAL /HPF
BASOPHILS # BLD MANUAL: 0 THOUSAND/UL (ref 0–0.1)
BASOPHILS NFR MAR MANUAL: 0 % (ref 0–1)
BILIRUB SERPL-MCNC: 0.2 MG/DL (ref 0.2–1)
BILIRUB UR QL STRIP: NEGATIVE
BUN SERPL-MCNC: 7 MG/DL (ref 5–25)
CA-I BLD-SCNC: 1.09 MMOL/L (ref 1.12–1.32)
CALCIUM SERPL-MCNC: 9.1 MG/DL (ref 8.3–10.1)
CHLORIDE SERPL-SCNC: 101 MMOL/L (ref 100–108)
CLARITY UR: CLEAR
CO2 SERPL-SCNC: 28 MMOL/L (ref 21–32)
COLOR UR: ABNORMAL
CREAT SERPL-MCNC: 0.73 MG/DL (ref 0.6–1.3)
EOSINOPHIL # BLD MANUAL: 0.22 THOUSAND/UL (ref 0–0.4)
EOSINOPHIL NFR BLD MANUAL: 2 % (ref 0–6)
ERYTHROCYTE [DISTWIDTH] IN BLOOD BY AUTOMATED COUNT: 14 % (ref 11.6–15.1)
FIBRINOGEN PPP-MCNC: 616 MG/DL (ref 227–495)
GFR SERPL CREATININE-BSD FRML MDRD: 112 ML/MIN/1.73SQ M
GLUCOSE SERPL-MCNC: 101 MG/DL (ref 65–140)
GLUCOSE UR STRIP-MCNC: NEGATIVE MG/DL
HCT VFR BLD AUTO: 27.4 % (ref 34.8–46.1)
HGB BLD-MCNC: 8.7 G/DL (ref 11.5–15.4)
HGB UR QL STRIP.AUTO: ABNORMAL
HYPERCHROMIA BLD QL SMEAR: PRESENT
INR PPP: 0.9 (ref 0.84–1.19)
INR PPP: 0.91 (ref 0.84–1.19)
KETONES UR STRIP-MCNC: NEGATIVE MG/DL
LEUKOCYTE ESTERASE UR QL STRIP: ABNORMAL
LYMPHOCYTES # BLD AUTO: 2.61 THOUSAND/UL (ref 0.6–4.47)
LYMPHOCYTES # BLD AUTO: 24 % (ref 14–44)
MAGNESIUM SERPL-MCNC: 3.8 MG/DL (ref 1.6–2.6)
MCH RBC QN AUTO: 27.9 PG (ref 26.8–34.3)
MCHC RBC AUTO-ENTMCNC: 31.8 G/DL (ref 31.4–37.4)
MCV RBC AUTO: 88 FL (ref 82–98)
MONOCYTES # BLD AUTO: 0.11 THOUSAND/UL (ref 0–1.22)
MONOCYTES NFR BLD: 1 % (ref 4–12)
NEUTROPHILS # BLD MANUAL: 7.84 THOUSAND/UL (ref 1.85–7.62)
NEUTS SEG NFR BLD AUTO: 72 % (ref 43–75)
NITRITE UR QL STRIP: NEGATIVE
NON-SQ EPI CELLS URNS QL MICRO: ABNORMAL /HPF
NRBC BLD AUTO-RTO: 0 /100 WBCS
PH UR STRIP.AUTO: 7.5 [PH]
PLATELET # BLD AUTO: 229 THOUSANDS/UL (ref 149–390)
PLATELET # BLD AUTO: 257 THOUSANDS/UL (ref 149–390)
PLATELET BLD QL SMEAR: ADEQUATE
PMV BLD AUTO: 10.2 FL (ref 8.9–12.7)
PMV BLD AUTO: 10.5 FL (ref 8.9–12.7)
POTASSIUM SERPL-SCNC: 3.3 MMOL/L (ref 3.5–5.3)
PROT SERPL-MCNC: 7.2 G/DL (ref 6.4–8.2)
PROT UR STRIP-MCNC: NEGATIVE MG/DL
PROTHROMBIN TIME: 11.8 SECONDS (ref 11.6–14.5)
PROTHROMBIN TIME: 12.2 SECONDS (ref 11.6–14.5)
RBC # BLD AUTO: 3.12 MILLION/UL (ref 3.81–5.12)
RBC #/AREA URNS AUTO: ABNORMAL /HPF
SODIUM SERPL-SCNC: 139 MMOL/L (ref 136–145)
SP GR UR STRIP.AUTO: 1.01 (ref 1–1.03)
TOTAL CELLS COUNTED SPEC: 100
UROBILINOGEN UR QL STRIP.AUTO: 0.2 E.U./DL
VARIANT LYMPHS # BLD AUTO: 1 %
WBC # BLD AUTO: 10.89 THOUSAND/UL (ref 4.31–10.16)
WBC #/AREA URNS AUTO: ABNORMAL /HPF

## 2019-09-09 PROCEDURE — 85027 COMPLETE CBC AUTOMATED: CPT | Performed by: EMERGENCY MEDICINE

## 2019-09-09 PROCEDURE — 36415 COLL VENOUS BLD VENIPUNCTURE: CPT | Performed by: EMERGENCY MEDICINE

## 2019-09-09 PROCEDURE — 83735 ASSAY OF MAGNESIUM: CPT | Performed by: PHYSICIAN ASSISTANT

## 2019-09-09 PROCEDURE — 81001 URINALYSIS AUTO W/SCOPE: CPT | Performed by: EMERGENCY MEDICINE

## 2019-09-09 PROCEDURE — 96375 TX/PRO/DX INJ NEW DRUG ADDON: CPT

## 2019-09-09 PROCEDURE — 99291 CRITICAL CARE FIRST HOUR: CPT | Performed by: PHYSICIAN ASSISTANT

## 2019-09-09 PROCEDURE — 85610 PROTHROMBIN TIME: CPT | Performed by: PHYSICIAN ASSISTANT

## 2019-09-09 PROCEDURE — 85610 PROTHROMBIN TIME: CPT | Performed by: EMERGENCY MEDICINE

## 2019-09-09 PROCEDURE — 96365 THER/PROPH/DIAG IV INF INIT: CPT

## 2019-09-09 PROCEDURE — 85730 THROMBOPLASTIN TIME PARTIAL: CPT | Performed by: PHYSICIAN ASSISTANT

## 2019-09-09 PROCEDURE — 82330 ASSAY OF CALCIUM: CPT | Performed by: PHYSICIAN ASSISTANT

## 2019-09-09 PROCEDURE — 85384 FIBRINOGEN ACTIVITY: CPT | Performed by: EMERGENCY MEDICINE

## 2019-09-09 PROCEDURE — 85730 THROMBOPLASTIN TIME PARTIAL: CPT | Performed by: EMERGENCY MEDICINE

## 2019-09-09 PROCEDURE — 84100 ASSAY OF PHOSPHORUS: CPT | Performed by: PHYSICIAN ASSISTANT

## 2019-09-09 PROCEDURE — 84156 ASSAY OF PROTEIN URINE: CPT | Performed by: EMERGENCY MEDICINE

## 2019-09-09 PROCEDURE — 99284 EMERGENCY DEPT VISIT MOD MDM: CPT

## 2019-09-09 PROCEDURE — 80053 COMPREHEN METABOLIC PANEL: CPT | Performed by: EMERGENCY MEDICINE

## 2019-09-09 PROCEDURE — 85049 AUTOMATED PLATELET COUNT: CPT | Performed by: PHYSICIAN ASSISTANT

## 2019-09-09 PROCEDURE — 82570 ASSAY OF URINE CREATININE: CPT | Performed by: EMERGENCY MEDICINE

## 2019-09-09 PROCEDURE — 85007 BL SMEAR W/DIFF WBC COUNT: CPT | Performed by: EMERGENCY MEDICINE

## 2019-09-09 PROCEDURE — 99283 EMERGENCY DEPT VISIT LOW MDM: CPT | Performed by: EMERGENCY MEDICINE

## 2019-09-09 RX ORDER — HYDRALAZINE HYDROCHLORIDE 20 MG/ML
5 INJECTION INTRAMUSCULAR; INTRAVENOUS ONCE
Status: DISCONTINUED | OUTPATIENT
Start: 2019-09-09 | End: 2019-09-09

## 2019-09-09 RX ORDER — LABETALOL 20 MG/4 ML (5 MG/ML) INTRAVENOUS SYRINGE
10 EVERY 4 HOURS PRN
Status: DISCONTINUED | OUTPATIENT
Start: 2019-09-09 | End: 2019-09-09

## 2019-09-09 RX ORDER — OXYCODONE HYDROCHLORIDE 5 MG/1
5 TABLET ORAL EVERY 4 HOURS PRN
Status: DISCONTINUED | OUTPATIENT
Start: 2019-09-09 | End: 2019-09-10 | Stop reason: HOSPADM

## 2019-09-09 RX ORDER — ACETAMINOPHEN 325 MG/1
650 TABLET ORAL EVERY 4 HOURS PRN
Status: DISCONTINUED | OUTPATIENT
Start: 2019-09-09 | End: 2019-09-10 | Stop reason: HOSPADM

## 2019-09-09 RX ORDER — ONDANSETRON 4 MG/1
4 TABLET, ORALLY DISINTEGRATING ORAL ONCE
Status: DISCONTINUED | OUTPATIENT
Start: 2019-09-09 | End: 2019-09-09

## 2019-09-09 RX ORDER — CHLORHEXIDINE GLUCONATE 0.12 MG/ML
15 RINSE ORAL EVERY 12 HOURS SCHEDULED
Status: DISCONTINUED | OUTPATIENT
Start: 2019-09-09 | End: 2019-09-10 | Stop reason: HOSPADM

## 2019-09-09 RX ORDER — HYDRALAZINE HYDROCHLORIDE 20 MG/ML
5 INJECTION INTRAMUSCULAR; INTRAVENOUS EVERY 4 HOURS PRN
Status: DISCONTINUED | OUTPATIENT
Start: 2019-09-09 | End: 2019-09-09

## 2019-09-09 RX ORDER — LABETALOL 20 MG/4 ML (5 MG/ML) INTRAVENOUS SYRINGE
20 EVERY 4 HOURS PRN
Status: DISCONTINUED | OUTPATIENT
Start: 2019-09-09 | End: 2019-09-10

## 2019-09-09 RX ORDER — HEPARIN SODIUM 5000 [USP'U]/ML
5000 INJECTION, SOLUTION INTRAVENOUS; SUBCUTANEOUS EVERY 8 HOURS SCHEDULED
Status: DISCONTINUED | OUTPATIENT
Start: 2019-09-09 | End: 2019-09-10 | Stop reason: HOSPADM

## 2019-09-09 RX ORDER — LABETALOL 20 MG/4 ML (5 MG/ML) INTRAVENOUS SYRINGE
10 ONCE
Status: COMPLETED | OUTPATIENT
Start: 2019-09-09 | End: 2019-09-09

## 2019-09-09 RX ORDER — LABETALOL 20 MG/4 ML (5 MG/ML) INTRAVENOUS SYRINGE
20 ONCE
Status: DISCONTINUED | OUTPATIENT
Start: 2019-09-09 | End: 2019-09-10

## 2019-09-09 RX ORDER — MAGNESIUM SULFATE HEPTAHYDRATE 40 MG/ML
2 INJECTION, SOLUTION INTRAVENOUS ONCE
Status: COMPLETED | OUTPATIENT
Start: 2019-09-09 | End: 2019-09-09

## 2019-09-09 RX ORDER — NIFEDIPINE 30 MG/1
30 TABLET, FILM COATED, EXTENDED RELEASE ORAL DAILY
Status: DISCONTINUED | OUTPATIENT
Start: 2019-09-09 | End: 2019-09-10 | Stop reason: HOSPADM

## 2019-09-09 RX ORDER — MAGNESIUM SULFATE HEPTAHYDRATE 40 MG/ML
2 INJECTION, SOLUTION INTRAVENOUS CONTINUOUS
Status: DISCONTINUED | OUTPATIENT
Start: 2019-09-09 | End: 2019-09-10

## 2019-09-09 RX ADMIN — NIFEDIPINE 30 MG: 30 TABLET, FILM COATED, EXTENDED RELEASE ORAL at 23:22

## 2019-09-09 RX ADMIN — LABETALOL 20 MG/4 ML (5 MG/ML) INTRAVENOUS SYRINGE 10 MG: at 20:28

## 2019-09-09 RX ADMIN — MAGNESIUM SULFATE HEPTAHYDRATE 2 G: 40 INJECTION, SOLUTION INTRAVENOUS at 20:31

## 2019-09-09 RX ADMIN — MAGNESIUM SULFATE HEPTAHYDRATE 2 G/HR: 40 INJECTION, SOLUTION INTRAVENOUS at 20:56

## 2019-09-09 NOTE — TELEPHONE ENCOUNTER
Transition of Care Post Partum phone call: Congratulations  Date of delivery: September 3, 2019  Weeks gestation: full term 38 1 weeks  Type of delivery:  section  Sex of child: female  Name of child:   Birth weight: 3232 gm   7lbs 2ounces  Perineum laceration: NA   How are you feeling: "feet are swollen  I feel ok  Cramping at times "  Vaginal bleeding status: light  Urinary status: none    Incision status: Yes healing well  Pain control: ibuprofen (OTC) and narcotic analgesics including oxycodone/acetaminophen (Percocet, Tylox)    Scheduled for follow-up appointment: Pt will speak with sister in law to facilitate a ride  Pt aware she needs an incision check 1 week after c/s  Will call our office back to schedule her appt

## 2019-09-10 VITALS
OXYGEN SATURATION: 98 % | DIASTOLIC BLOOD PRESSURE: 85 MMHG | RESPIRATION RATE: 20 BRPM | BODY MASS INDEX: 35.9 KG/M2 | WEIGHT: 195.11 LBS | HEART RATE: 100 BPM | HEIGHT: 62 IN | TEMPERATURE: 97.9 F | SYSTOLIC BLOOD PRESSURE: 134 MMHG

## 2019-09-10 PROBLEM — O14.90 PREECLAMPSIA: Status: ACTIVE | Noted: 2019-09-10

## 2019-09-10 LAB
ALBUMIN SERPL BCP-MCNC: 2.8 G/DL (ref 3.5–5)
ALP SERPL-CCNC: 110 U/L (ref 46–116)
ALT SERPL W P-5'-P-CCNC: 23 U/L (ref 12–78)
ANION GAP SERPL CALCULATED.3IONS-SCNC: 11 MMOL/L (ref 4–13)
AST SERPL W P-5'-P-CCNC: 20 U/L (ref 5–45)
BASOPHILS # BLD MANUAL: 0.1 THOUSAND/UL (ref 0–0.1)
BASOPHILS NFR MAR MANUAL: 1 % (ref 0–1)
BILIRUB SERPL-MCNC: 0.2 MG/DL (ref 0.2–1)
BUN SERPL-MCNC: 7 MG/DL (ref 5–25)
CALCIUM SERPL-MCNC: 8.5 MG/DL (ref 8.3–10.1)
CHLORIDE SERPL-SCNC: 101 MMOL/L (ref 100–108)
CO2 SERPL-SCNC: 26 MMOL/L (ref 21–32)
CREAT SERPL-MCNC: 0.7 MG/DL (ref 0.6–1.3)
CREAT UR-MCNC: 27.7 MG/DL
EOSINOPHIL # BLD MANUAL: 0 THOUSAND/UL (ref 0–0.4)
EOSINOPHIL NFR BLD MANUAL: 0 % (ref 0–6)
ERYTHROCYTE [DISTWIDTH] IN BLOOD BY AUTOMATED COUNT: 14.2 % (ref 11.6–15.1)
GFR SERPL CREATININE-BSD FRML MDRD: 117 ML/MIN/1.73SQ M
GLUCOSE SERPL-MCNC: 109 MG/DL (ref 65–140)
HCT VFR BLD AUTO: 30 % (ref 34.8–46.1)
HGB BLD-MCNC: 9.5 G/DL (ref 11.5–15.4)
LYMPHOCYTES # BLD AUTO: 1.86 THOUSAND/UL (ref 0.6–4.47)
LYMPHOCYTES # BLD AUTO: 19 % (ref 14–44)
MAGNESIUM SERPL-MCNC: 4.8 MG/DL (ref 1.6–2.6)
MAGNESIUM SERPL-MCNC: 5.5 MG/DL (ref 1.6–2.6)
MCH RBC QN AUTO: 27.9 PG (ref 26.8–34.3)
MCHC RBC AUTO-ENTMCNC: 31.7 G/DL (ref 31.4–37.4)
MCV RBC AUTO: 88 FL (ref 82–98)
METAMYELOCYTES NFR BLD MANUAL: 2 % (ref 0–1)
MONOCYTES # BLD AUTO: 0.49 THOUSAND/UL (ref 0–1.22)
MONOCYTES NFR BLD: 5 % (ref 4–12)
NEUTROPHILS # BLD MANUAL: 7.14 THOUSAND/UL (ref 1.85–7.62)
NEUTS BAND NFR BLD MANUAL: 3 % (ref 0–8)
NEUTS SEG NFR BLD AUTO: 70 % (ref 43–75)
NRBC BLD AUTO-RTO: 0 /100 WBCS
PHOSPHATE SERPL-MCNC: 4.1 MG/DL (ref 2.7–4.5)
PLATELET # BLD AUTO: 259 THOUSANDS/UL (ref 149–390)
PLATELET BLD QL SMEAR: ADEQUATE
PMV BLD AUTO: 10.3 FL (ref 8.9–12.7)
POTASSIUM SERPL-SCNC: 3.5 MMOL/L (ref 3.5–5.3)
PROT SERPL-MCNC: 7.7 G/DL (ref 6.4–8.2)
PROT UR-MCNC: 10 MG/DL
PROT/CREAT UR: 0.36 MG/G{CREAT} (ref 0–0.1)
RBC # BLD AUTO: 3.4 MILLION/UL (ref 3.81–5.12)
SODIUM SERPL-SCNC: 138 MMOL/L (ref 136–145)
TOTAL CELLS COUNTED SPEC: 100
WBC # BLD AUTO: 9.78 THOUSAND/UL (ref 4.31–10.16)

## 2019-09-10 PROCEDURE — 99238 HOSP IP/OBS DSCHRG MGMT 30/<: CPT | Performed by: PHYSICIAN ASSISTANT

## 2019-09-10 PROCEDURE — 94760 N-INVAS EAR/PLS OXIMETRY 1: CPT

## 2019-09-10 PROCEDURE — 83735 ASSAY OF MAGNESIUM: CPT | Performed by: PHYSICIAN ASSISTANT

## 2019-09-10 PROCEDURE — 85007 BL SMEAR W/DIFF WBC COUNT: CPT | Performed by: PHYSICIAN ASSISTANT

## 2019-09-10 PROCEDURE — 94664 DEMO&/EVAL PT USE INHALER: CPT

## 2019-09-10 PROCEDURE — 99253 IP/OBS CNSLTJ NEW/EST LOW 45: CPT | Performed by: OBSTETRICS & GYNECOLOGY

## 2019-09-10 PROCEDURE — 85027 COMPLETE CBC AUTOMATED: CPT | Performed by: PHYSICIAN ASSISTANT

## 2019-09-10 PROCEDURE — 80053 COMPREHEN METABOLIC PANEL: CPT | Performed by: PHYSICIAN ASSISTANT

## 2019-09-10 RX ORDER — NIFEDIPINE 30 MG/1
30 TABLET, FILM COATED, EXTENDED RELEASE ORAL DAILY
Qty: 30 TABLET | Refills: 0 | Status: SHIPPED | OUTPATIENT
Start: 2019-09-10 | End: 2019-11-27 | Stop reason: ALTCHOICE

## 2019-09-10 RX ORDER — IBUPROFEN 600 MG/1
600 TABLET ORAL EVERY 6 HOURS PRN
Status: DISCONTINUED | OUTPATIENT
Start: 2019-09-10 | End: 2019-09-10 | Stop reason: HOSPADM

## 2019-09-10 RX ORDER — ALBUTEROL SULFATE 2.5 MG/3ML
2.5 SOLUTION RESPIRATORY (INHALATION) EVERY 4 HOURS PRN
Status: DISCONTINUED | OUTPATIENT
Start: 2019-09-10 | End: 2019-09-10 | Stop reason: HOSPADM

## 2019-09-10 RX ORDER — POLYETHYLENE GLYCOL 3350 17 G/17G
17 POWDER, FOR SOLUTION ORAL DAILY PRN
Status: DISCONTINUED | OUTPATIENT
Start: 2019-09-10 | End: 2019-09-10 | Stop reason: HOSPADM

## 2019-09-10 RX ORDER — ALBUTEROL SULFATE 2.5 MG/3ML
2.5 SOLUTION RESPIRATORY (INHALATION) EVERY 6 HOURS PRN
Status: DISCONTINUED | OUTPATIENT
Start: 2019-09-10 | End: 2019-09-10 | Stop reason: HOSPADM

## 2019-09-10 RX ORDER — LABETALOL 20 MG/4 ML (5 MG/ML) INTRAVENOUS SYRINGE
20
Status: DISCONTINUED | OUTPATIENT
Start: 2019-09-10 | End: 2019-09-10 | Stop reason: HOSPADM

## 2019-09-10 RX ORDER — POTASSIUM CHLORIDE 20 MEQ/1
20 TABLET, EXTENDED RELEASE ORAL ONCE
Status: DISCONTINUED | OUTPATIENT
Start: 2019-09-10 | End: 2019-09-10 | Stop reason: HOSPADM

## 2019-09-10 RX ORDER — AMOXICILLIN 250 MG
1 CAPSULE ORAL
Status: DISCONTINUED | OUTPATIENT
Start: 2019-09-11 | End: 2019-09-10 | Stop reason: HOSPADM

## 2019-09-10 RX ORDER — LANOLIN ALCOHOL/MO/W.PET/CERES
3 CREAM (GRAM) TOPICAL
Status: DISCONTINUED | OUTPATIENT
Start: 2019-09-10 | End: 2019-09-10 | Stop reason: HOSPADM

## 2019-09-10 RX ADMIN — IBUPROFEN 600 MG: 600 TABLET ORAL at 15:27

## 2019-09-10 RX ADMIN — CHLORHEXIDINE GLUCONATE 0.12% ORAL RINSE 15 ML: 1.2 LIQUID ORAL at 08:16

## 2019-09-10 RX ADMIN — MELATONIN 3 MG: at 01:55

## 2019-09-10 RX ADMIN — IBUPROFEN 600 MG: 600 TABLET ORAL at 08:26

## 2019-09-10 RX ADMIN — NIFEDIPINE 30 MG: 30 TABLET, FILM COATED, EXTENDED RELEASE ORAL at 08:17

## 2019-09-10 RX ADMIN — MAGNESIUM SULFATE HEPTAHYDRATE 2 G/HR: 40 INJECTION, SOLUTION INTRAVENOUS at 08:21

## 2019-09-10 RX ADMIN — IBUPROFEN 600 MG: 600 TABLET ORAL at 01:55

## 2019-09-10 NOTE — PLAN OF CARE
Problem: CARDIOVASCULAR - ADULT  Goal: Maintains optimal cardiac output and hemodynamic stability  Description  INTERVENTIONS:  - Monitor I/O, vital signs and rhythm  - Monitor for S/S and trends of decreased cardiac output  - Administer and titrate ordered vasoactive medications to optimize hemodynamic stability  - Assess quality of pulses, skin color and temperature  - Assess for signs of decreased coronary artery perfusion  - Instruct patient to report change in severity of symptoms  Outcome: Adequate for Discharge  Goal: Absence of cardiac dysrhythmias or at baseline rhythm  Description  INTERVENTIONS:  - Continuous cardiac monitoring, vital signs, obtain 12 lead EKG if ordered  - Administer antiarrhythmic and heart rate control medications as ordered  - Monitor electrolytes and administer replacement therapy as ordered  Outcome: Adequate for Discharge     Problem: Potential for Falls  Goal: Patient will remain free of falls  Description  INTERVENTIONS:  - Assess patient frequently for physical needs  -  Identify cognitive and physical deficits and behaviors that affect risk of falls    -  [place] fall precautions as indicated by assessment   - Educate patient/family on patient safety including physical limitations  - Instruct patient to call for assistance with activity based on assessment  - Modify environment to reduce risk of injury  - Consider OT/PT consult to assist with strengthening/mobility  Outcome: Adequate for Discharge

## 2019-09-10 NOTE — SOCIAL WORK
CM met with pt at bedside  Pt is a readmission  She delivered a baby 1 wk ago via  Section  Was doing well until yesterday when she developed cramping and abd pain  She did not contact her OB prior, but came to the ER for evaluation  Pt lives with her  Lambert Schafer and children in a 2 story house with 13 steps w/railing to reach her bedroom and 4 DALJIT  Pt has no problem navigating steps and is independent with ADL's  She uses no DME's to ambulate, but uses a nebulizer prn  She has never been in rehab or used [place] services  Denies substance abuse or mental health issues  She quit smoking when she found out she was pregnant 9 months ago  Dr Chowdhury Mask is her PCP  She uses [employer] in [place] and has no problem with co-pays  She does not have a POA or Advanced Directive and does not want info at this time  She does not work, but still drives  Lambert Schafer will transport home when she is medically cleared  CM discussed d/c needs including [place] services, but pt does not feel this will be needed  CM will continue to follow  CM reviewed discharge planning process including the following: identifying help at home, patient preference for discharge planning needs, pharmacy preference, and availability of treatment team to discuss questions or concerns patient and/or family may have regarding understanding medications and recognizing signs and symptoms once discharged  CM also encouraged patient to follow up with all recommended appointments after discharge  Patient advised of importance for patient and family to participate in managing patients medical well being  CM name and role reviewed  Discharge Checklist reviewed and CM will continue to monitor for progress toward discharge goals in nursing and provider rounds

## 2019-09-10 NOTE — H&P
History and Physical - Critical Care  Gely Candelario 34 y o  female MRN: [id number]  Unit/Bed#:  Encounter: [id number]     Reason for Admission / Chief Complaint: gestational HTN     History of Present Illness:  Gely Candelario is a 34 y o  female w PMH anxiety, depression, asthma who is admitted to the ICU w preeclampsia  Patient [id number] who is 6 days post partum s/p  section, b/l salpingectomy  She had been monitored closely in late pregnancy for gestational HTN   is well and at home  She had been doing well post-operatively however over the past two days had an increase in her cramping and lower abdominal pain and has been taking percocet infrequently for pain along with motrin  Has been passing vaginal clots, largest are the size of a golf ball  She has also noted edema of her lower extremities that she had not experienced prior to delivery  Today she started to feel woozy, described feeling slightly dizzy and nauseous, felt like her R eye was twitching and developed a slight headache  Her  checked her BP which was 370V systolic and brought her to the ED for evaluation  She did have some RUQ earlier that has resolved at time of my evaluation  History obtained from chart review and the patient  Past Medical History:  Past Medical History:   Diagnosis Date    Anemia     Anemia during pregnancy   Anxiety     Asthma     Depression     Postpartum depression   Kidney infection     Kidney stone     Miscarriage     MRSA infection     Rh incompatibility     Urinary tract infection     Varicella     Pt said she had chicken pox twice when she was younger          Past Surgical History:  Past Surgical History:   Procedure Laterality Date    ADENOIDECTOMY       SECTION      DILATION AND CURETTAGE OF UTERUS      [place]  DELIVERY ONLY N/A 9/3/2019    Procedure:  SECTION () REPEAT;  Surgeon: Antolin Ann DO;  [facility]: [facility]; Service: Obstetrics    OR LIGATION,FALLOPIAN TUBE W/ Bilateral 9/3/2019    Procedure: LIGATION/COAGULATION TUBAL;  Surgeon: Zay Boateng DO;  Location: [facility] ;  Service: Obstetrics    TONSILLECTOMY          Past Family History:  Family History   Problem Relation Age of Onset    No Known Problems Mother     Heart disease Father     Heart attack Father     Stroke Father     No Known Problems Daughter     No Known Problems Son     Diverticulitis Maternal Grandmother     Diabetes Maternal Grandfather     Aneurysm Maternal Grandfather     No Known Problems Paternal Grandmother     Heart disease Paternal Grandfather     Heart attack Paternal Grandfather     Heart disease Family     Crohn's disease Paternal Aunt         Social History:  Social History     Tobacco Use   Smoking Status Former Smoker    Packs/day: 0 00    Types: Cigarettes   Smokeless Tobacco Never Used   Tobacco Comment    approx 2 cigarettes     Social History     Substance and Sexual Activity   Alcohol Use No     Social History     Substance and Sexual Activity   Drug Use Not Currently    Types: Marijuana    Comment: last use approx 2018     Marital Status:   Exercise History: unknown     Medications:  Current Facility-Administered Medications   Medication Dose Route Frequency    acetaminophen (TYLENOL) tablet 650 mg  650 mg Oral Q4H PRN    albuterol inhalation solution 2 5 mg  2 5 mg Nebulization Q4H PRN    albuterol inhalation solution 2 5 mg  2 5 mg Nebulization Q6H PRN    chlorhexidine (PERIDEX) 0 12 % oral rinse 15 mL  15 mL Swish & Spit Q12H [facility]    heparin (porcine) subcutaneous injection 5,000 Units  5,000 Units Subcutaneous Q8H [facility]    Labetalol HCl (NORMODYNE) injection 20 mg  20 mg Intravenous Q10 Min PRN    magnesium sulfate 20 g/500 mL infusion (premix)  2 g/hr Intravenous Continuous    NIFEdipine ER (ADALAT CC) 24 hr tablet 30 mg  30 mg Oral Daily    oxyCODONE (ROXICODONE) IR tablet 5 mg  5 mg Oral Q4H PRN    polyethylene glycol (MIRALAX) packet 17 g  17 g Oral Daily PRN    [START ON 9/11/2019] senna-docusate sodium (SENOKOT S) 8 6-50 mg per tablet 1 tablet  1 tablet Oral HS     Home medications:  Prior to Admission medications    Medication Sig Start Date End Date Taking?  Authorizing Provider   albuterol (2 5 mg/3 mL) 0 083 % nebulizer solution Take 3 mL by nebulization every 6 (six) hours as needed for wheezing 1/9/18  Yes Bree Scott MD   albuterol (PROVENTIL HFA,VENTOLIN HFA) 90 mcg/act inhaler Inhale 2 puffs every 4 (four) hours as needed for wheezing 1/9/18  Yes Bree Scott MD   ferrous sulfate 324 (65 Fe) mg Take 1 tablet (324 mg total) by mouth 2 (two) times a day before meals 7/8/19  Yes JOSE A Ang   ibuprofen (MOTRIN) 600 mg tablet Take 1 tablet (600 mg total) by mouth every 6 (six) hours as needed for mild pain, moderate pain, fever or headaches (cramping) 9/6/19  Yes Celestina Meza MD   oxyCODONE (ROXICODONE) 5 mg immediate release tablet Take 1-2 tablets (5-10 mg total) by mouth every 4 (four) hours as needed for severe pain for up to 10 daysMax Daily Amount: 30 mg 9/6/19 9/16/19 Yes Celestina Meza MD   acetaminophen (TYLENOL) 325 mg tablet Take 2 tablets (650 mg total) by mouth every 4 (four) hours as needed for mild pain, moderate pain, severe pain, headaches or fever  Patient not taking: Reported on 9/9/2019 9/6/19   Celestina Meza MD   docusate sodium (COLACE) 100 mg capsule Take 1 capsule (100 mg total) by mouth 2 (two) times a day as needed for constipation  Patient not taking: Reported on 8/21/2019 3/18/19   JOSE A Krueger   montelukast (SINGULAIR) 10 mg tablet Take 1 tablet (10 mg total) by mouth daily at bedtime  Patient not taking: Reported on 9/3/2019 9/16/18   Clive Vargas MD   Prenatal Vit-Fe Fumarate-FA (PRENATAL VITAMIN PO) Take by mouth    Historical Provider, MD   simethicone (MYLICON) 80 mg chewable tablet Chew 1 tablet (80 mg total) every 6 (six) hours as needed for flatulence  Patient not taking: Reported on 2019   Valerio Parra MD     Allergies: Allergies   Allergen Reactions    Apple Anaphylaxis    Nuts Other (See Comments)     "itchy throat and difficulty swallowing "    Pineapple Anaphylaxis    Shellfish Allergy Other (See Comments)     "itchy throat and difficulty swallowing "    Shellfish-Derived Products Anaphylaxis    Other Hives and Rash     Black berries     Raspberry Hives and Rash    Penicillins Rash        ROS:   Review of Systems   Constitutional: Negative for chills, diaphoresis and fever  HENT: Negative for congestion and sore throat  Eyes: Negative for visual disturbance  Respiratory: Negative for cough, chest tightness, shortness of breath and wheezing  Cardiovascular: Positive for leg swelling  Negative for chest pain  Gastrointestinal: Positive for abdominal pain  Negative for constipation, diarrhea, nausea and vomiting  Genitourinary: Positive for pelvic pain and vaginal bleeding  Negative for difficulty urinating, dysuria, frequency, hematuria, urgency, vaginal discharge and vaginal pain  Musculoskeletal: Negative for arthralgias and myalgias  Neurological: Positive for dizziness and headaches  Negative for weakness, light-headedness and numbness  Psychiatric/Behavioral: The patient is not nervous/anxious  Vitals:  Vitals:    09/10/19 0000 09/10/19 0030 09/10/19 0100 09/10/19 0105   BP: 152/93 159/91 151/90    BP Location:       Pulse: 74 80 77    Resp: 18 20 20    Temp:       TempSrc:       SpO2: 97% 99% 94% 98%   Weight:       Height:         Temperature:   Temp (24hrs), Av 3 °F (36 8 °C), Min:98 2 °F (36 8 °C), Max:98 4 °F (36 9 °C)    Current: Temperature: 98 4 °F (36 9 °C)     Weights:   IBW: 50 1 kg  Body mass index is 35 69 kg/m²       Hemodynamic Monitoring:  N/A     Non-Invasive/Invasive Ventilation Settings:  Respiratory    Lab Data (Last 4 hours) None         O2/Vent Data (Last 4 hours)    None              No results found for: PHART, VRS6XMS, PO2ART, PWK0EKC, U1PEDGCU, BEART, SOURCE  SpO2: SpO2: 98 %     Physical Exam:  Physical Exam   Constitutional: She is oriented to person, place, and time  She appears well-developed and well-nourished  No distress  HENT:   Head: Normocephalic and atraumatic  Eyes: Pupils are equal, round, and reactive to light  Neck: Normal range of motion  Neck supple  No tracheal deviation present  Cardiovascular: Normal rate, regular rhythm, normal heart sounds and intact distal pulses  Exam reveals no gallop and no friction rub  No murmur heard  Pulmonary/Chest: Effort normal and breath sounds normal  No respiratory distress  She has no wheezes  She has no rales  Abdominal: Soft  Bowel sounds are normal  She exhibits no distension and no mass  There is no tenderness  There is no guarding  c section incision, c/d/i  Mild ttp of the RLQ, otherwise nontender   B/l mild flank ttp   Musculoskeletal: She exhibits no edema or deformity  Neurological: She is alert and oriented to person, place, and time  GCS 15 nonfocal, normal strength / sensation, facies symmetric, clear fluent speech   Skin: Skin is warm and dry  She is not diaphoretic  Moderate pitting edema from feet to knees, trace edema just above knees  No edema of the arms / hands    Psychiatric: She has a normal mood and affect  Her behavior is normal    Nursing note and vitals reviewed         Labs:  Results from last 7 days   Lab Units 09/09/19  2342 09/09/19 2022 09/04/19  0556 09/03/19  1542   WBC Thousand/uL  --  10 89* 15 87* 9 21   HEMOGLOBIN g/dL  --  8 7* 7 8* 9 6*   HEMATOCRIT %  --  27 4* 24 6* 29 4*   PLATELETS Thousands/uL 229 257 174 192   NEUTROS PCT %  --   --  86*  --    MONOS PCT %  --   --  5  --    MONO PCT %  --  1*  --   --       Results from last 7 days   Lab Units 09/09/19 2022 09/04/19  0556 09/03/19  1621   SODIUM mmol/L 139 141 142   POTASSIUM mmol/L 3 3* 3 5 3 6   CHLORIDE mmol/L 101 109* 110*   CO2 mmol/L 28 24 21   BUN mg/dL 7 5 4*   CREATININE mg/dL 0 73 0 57* 0 64   CALCIUM mg/dL 9 1 8 7 9 4   ALK PHOS U/L 103 87 120*   ALT U/L 27 12 18   AST U/L 19 14 15     Results from last 7 days   Lab Units 09/09/19  2342   MAGNESIUM mg/dL 3 8*     Results from last 7 days   Lab Units 09/09/19  2022   PHOSPHORUS mg/dL 4 1      Results from last 7 days   Lab Units 09/09/19  2342 09/09/19  2101   INR  0 91 0 90   PTT seconds 31 31         0   Lab Value Date/Time    TROPONINI <0 02 01/09/2018 1046        Imaging:   No orders to display      I have personally reviewed pertinent reports  and I have personally reviewed pertinent films in PACS  EKG: As per tele NSR no ectopy  This was personally reviewed by myself  Micro:  Lab Results   Component Value Date    URINECX (A) 03/18/2019     <10,000 cfu/ml Beta Hemolytic Streptococcus Group B    URINECX <10,000 cfu/ml  03/18/2019    URINECX >100,000 cfu/ml Lactobacillus species (A) 03/18/2019       Assessment:     33 yo F w PMH asthma, depression, anxiety who is maintained in the ICU w preeclampsia  No sx of end organ dysfunction on her lab-work, non focal neuro exam however exhibits LE edema and headache  Requires Mg infusion, frequent BP monitoring / treatment and neurochecks          Plan:                  Neuro:    Preeclampsia - seizure precautions, q1h neurochecks, Mg sulfate infusion 2g/hr continuously      Analgesia - 650mg tylenol Q6 PRN, oxy 5 Q4 PRN, ensure post-operative pain controlled to aid in BP control                 CV:     Hypertension - procardia XL 30mg, treat BP if > 165/105 w labetalol 20mg x1, if no response in 10min if no response tx w labetalol 40mg x1, if no response in 10min tx w labetalol 80mg Q10 x2 doses, OBGYN to evaluate tomorrow     Monitor for sx pulmonary edema, EKG non-ischemic w no sx R heart strain              Lung:    IS / ambulate to prevent atelectasis     Asthma - respiratory protocol, if requiring significant doses of labetalol can switch to hydralazine                 GI:    GI ppx - not indicated      Bowel regimen - senokot, PRN miralax, has had spontaneous return of bowel function s/p surgery, most recent BM earlier today                 FEN:    Lytes - Mg infusion 2g/hr x 24hr, monitor for sx Mg toxicity such as hypotension, bradycardia, n/v, respiratory depression, lethargy, hyporeflexia, tx w calcium gluconate as needed, check Mg Q6      Nutrition - regular diet      Fluid - no mIVF, fluid restrict                  :    Stable - no acute issues   Monitor renal function                  ID:    Stable - no acute issues                 Heme:    DVT ppx - SCDs, SQH                  Endo:    Stable - no acute issues                 Msk/Skin:    LE edema - daily weights, elevate extremities, ambulate, would not doppler as bilateral sx, consistent w preeclampsia and no pain to suggest DVT      Repetitive repositioning and off-loading to prevent skin break down and ulcerative formation                 Disposition:    Continue ICU care      VTE Pharmacologic Prophylaxis: Sequential compression device (Venodyne)  and Heparin  VTE Mechanical Prophylaxis: sequential compression device     Invasive lines and devices: Invasive Devices     Peripheral Intravenous Line            Peripheral IV 09/09/19 Dorsal (posterior); Left Forearm less than 1 day    Peripheral IV 09/09/19 Right Antecubital less than 1 day                 Code Status: Level 1 - Full Code  POA:    POLST:       Given critical illness, patient length of stay will require greater than two midnights  Counseling / Coordination of Care  Total Critical Care time spent 36 minutes excluding procedures, teaching and family updates  Portions of the record may have been created with voice recognition software    Occasional wrong word or "sound a like" substitutions may have occurred due to the inherent limitations of voice recognition software  Read the chart carefully and recognize, using context, where substitutions have occurred          Marisol Pascual PA-C

## 2019-09-10 NOTE — PROGRESS NOTES
Ambulated pt around the unit, during the walk, pt developed intense cramping, 7/10 pain  Immediately returned to bed, placed back on the monitor, VSS, no bleeding noted, pt states that she feels better sitting, prn med given, will continue to monitor

## 2019-09-10 NOTE — UTILIZATION REVIEW
Notification of Maternity Inpatient Admission/Maternity Inpatient Authorization Request  This is a Notification of Maternity Inpatient Admission/Maternity Inpatient Authorization Request to our facility [address] [place] [address]  Please be advised that this patient is currently in our facility under Inpatient Status  Below you will find the Birth/ Summary, Attending Physician and Facilitys information including NPI#  and contact information for the Utilization Review Department where the patient is receiving care services  Facility: [address] [place] [address]  Address: [address] [place] [address]  Phone: [phone] Tax ID: [id number]  NPI: [id number]  MEDICARE ID: [id number]    Place of Service Code: 24   Place of Service Name: Inpatient Hospital  Presentation Date & Time: 9/3/2019  3:06 PM  Inpatient Admission Date & Time: 9/3/19 1534  Discharge Date & Time: 2019  3:40 PM   Discharge Disposition (if discharged): Home/Self Care  Attending Physician & NPI: Brigid Yanes Md [[id number]]  Attending Physician:  CHARLES Braxton    Specialty- Obstetrics and Gynecology  [facility] ID- [id number]  [address] [place] [address]  Phone 1: [phone]  Fax: [phone]  Mother of  Information: Lalitha Thrasher   MRN: [id number] YOB: 1990   Mother's Admitting Diagnosis: 45 weeks gestation of pregnancy [Z3A 38]  Estimated Date of Delivery: 19  Type of Delivery: , Low Transverse    Delivering clinician: Jose Mccartney   OB History        4    Para   3    Term   3            AB   1    Living   3       SAB   1    TAB        Ectopic        Multiple   0    Live Births   3               [place] Name & MRN:   Information for the patient's :  Marilia Nilton [[id number]]     [place] Delivery Information:  Sex: female  Delivered 9/3/2019 6:55 PM by , Low Transverse; Gestational Age: 38w1d    [place] Measurements:  Weight: 7 lb 2 oz (3232 g); Height: 19 5"    APGAR 1 minute 5 minutes 10 minutes   Totals: 9 9      Thank you,  [address] [facility] Review Department  Phone: [phone]; Fax [phone]  ATTENTION: Please call with any questions or concerns to [phone]  and carefully follow the prompts so that you are directed to the right person  Send all requests for admission clinical reviews, approved or denied determinations and any other requests to fax [phone]   All voicemails are confidential

## 2019-09-10 NOTE — PROGRESS NOTES
Progress Note - Critical Care   Shadi Mosher 34 y o  female MRN: [id number]  Unit/Bed#:  Encounter: [id number]    Assessment:     35 yo F w PMH anxiety, depression, asthma admitted for post partum preeclampsia, maintained in ICU on continuous magnesium infusion and hemodynamic monitoring  Principal Problem:    Preeclampsia      Plan:          Neuro:    Preeclampsia - seizure precautions, q1 neurochecks, Mg sulfate infusion, BP control     Analgesia - PRN tylenol, motrin, oxycodone, tx post-operative pain, cramping to help reduce BP                 CV:    Hypertension - received procardia XL 30mg on admission, monitor BP and treat if >165/105 w PRN labetalol                 Lung:    IS / ambulate / pulm toilet to prevent atelectasis      Asthma - PRN albuterol, no O2 requirements                 GI:    GI ppx - not indicated    Bowel regimen - senokot, PRN miralax                 FEN:    Lytes - Mg infusion x 24h, monitor for s/sx Mg toxicity, tx w calcium gluconate if needed   Nutrition - regular diet    Fluid - no mIVF                 :    Stable - no acute issues   Monitor renal function                  ID:    Stable - no acute issues                 Heme:    DVT ppx - SCDs, SQH, ambulate                  Endo:    Stable - no acute issues                            Msk/Skin:    LE edema - doubt DVT given preeclampsia, b/l sx, no pain, elevate extremities      Repetitive repositioning and off-loading to prevent skin break down and ulcerative formation                 Disposition:    Continue ICU care       Chief Complaint: intermittent abdominal cramping    HPI/24hr events: admitted and placed on mg infusion, tx w procardia XL 30mg tablet, had one BP reading overnight over 165 but BP improved w ibuprofen as she simultaneously had abdominal cramping     Physical Exam: Physical Exam   Constitutional: She is oriented to person, place, and time  She appears well-developed and well-nourished   No distress  HENT:   Head: Normocephalic and atraumatic  Eyes: Pupils are equal, round, and reactive to light  Neck: Neck supple  No tracheal deviation present  Cardiovascular: Normal rate, regular rhythm, normal heart sounds and intact distal pulses  Exam reveals no gallop and no friction rub  No murmur heard  Pulmonary/Chest: Effort normal and breath sounds normal  No respiratory distress  She has no wheezes  She has no rales  Abdominal: Soft  Bowel sounds are normal  She exhibits no distension and no mass  There is no tenderness  There is no guarding  Incision c/d/i   Musculoskeletal: She exhibits no edema or deformity  Neurological: She is alert and oriented to person, place, and time  GCS 15 nonfocal  Normal reflexes   Skin: Skin is warm and dry  She is not diaphoretic  Psychiatric: She has a normal mood and affect  Her behavior is normal    Nursing note and vitals reviewed  Vitals:    09/10/19 0330 09/10/19 0430 09/10/19 0500 09/10/19 0530   BP: 108/57 108/52 101/52 104/71   Pulse: 71 74 74 76   Resp: 18 14 14 22   Temp:       TempSrc:       SpO2: 97% 98% 97% 97%   Weight:       Height:                 Temperature:   Temp (24hrs), Av 4 °F (36 9 °C), Min:98 2 °F (36 8 °C), Max:98 6 °F (37 °C)    Current: Temperature: 98 6 °F (37 °C)    Weights:   IBW: 50 1 kg    Body mass index is 35 69 kg/m²  Weight (last 2 days)     Date/Time   Weight    19 2300   88 5 (195 11)    19 2000   89 7 (197 75)              Hemodynamic Monitoring:  N/A     Non-Invasive/Invasive Ventilation Settings:  Respiratory    Lab Data (Last 4 hours)    None         O2/Vent Data (Last 4 hours)    None              No results found for: PHART, NZZ0RHU, PO2ART, FEH4OXQ, G9JHMYCK, BEART, SOURCE  SpO2: SpO2: 97 %    Intake and Outputs:  I/O        07 -  0700  07 - 09/10 0700    P  O   840    IV Piggyback  200    Total Intake(mL/kg)  1040 (11 8)    Net  +1040          Unmeasured Urine Occurrence  3 x          Nutrition:        Diet Orders   (From admission, onward)             Start     Ordered    09/09/19 2340  Diet Regular; Regular House  Diet effective now     Question Answer Comment   Diet Type Regular    Regular Regular House    RD to adjust diet per protocol? Yes        09/09/19 2339                Labs:   Results from last 7 days   Lab Units 09/09/19  2342 09/09/19 2022 09/04/19  0556 09/03/19  1542   WBC Thousand/uL  --  10 89* 15 87* 9 21   HEMOGLOBIN g/dL  --  8 7* 7 8* 9 6*   HEMATOCRIT %  --  27 4* 24 6* 29 4*   PLATELETS Thousands/uL 229 257 174 192   NEUTROS PCT %  --   --  86*  --    MONOS PCT %  --   --  5  --    MONO PCT %  --  1*  --   --       Results from last 7 days   Lab Units 09/09/19 2022 09/04/19  0556 09/03/19  1621   SODIUM mmol/L 139 141 142   POTASSIUM mmol/L 3 3* 3 5 3 6   CHLORIDE mmol/L 101 109* 110*   CO2 mmol/L 28 24 21   BUN mg/dL 7 5 4*   CREATININE mg/dL 0 73 0 57* 0 64   CALCIUM mg/dL 9 1 8 7 9 4   ALK PHOS U/L 103 87 120*   ALT U/L 27 12 18   AST U/L 19 14 15     Results from last 7 days   Lab Units 09/09/19  2342   MAGNESIUM mg/dL 3 8*     Results from last 7 days   Lab Units 09/09/19  2022   PHOSPHORUS mg/dL 4 1      Results from last 7 days   Lab Units 09/09/19  2342 09/09/19  2101   INR  0 91 0 90   PTT seconds 31 31         0   Lab Value Date/Time    TROPONINI <0 02 01/09/2018 1046       Imaging:   No orders to display      I have personally reviewed pertinent reports  and I have personally reviewed pertinent films in PACS    EKG: as per tele nsr no ectopy     Micro:  Lab Results   Component Value Date    URINECX (A) 03/18/2019     <10,000 cfu/ml Beta Hemolytic Streptococcus Group B    URINECX <10,000 cfu/ml  03/18/2019    URINECX >100,000 cfu/ml Lactobacillus species (A) 03/18/2019       Allergies:    Allergies   Allergen Reactions    Apple Anaphylaxis    Nuts Other (See Comments)     "itchy throat and difficulty swallowing "    Pineapple Anaphylaxis    Shellfish Allergy Other (See Comments)     "itchy throat and difficulty swallowing "    Shellfish-Derived Products Anaphylaxis    Other Hives and Rash     Black berries     Raspberry Hives and Rash    Penicillins Rash       Medications:   Scheduled Meds:    Current Facility-Administered Medications:  acetaminophen 650 mg Oral Q4H PRN Caitlin Thorpe PA-C    albuterol 2 5 mg Nebulization Q4H PRN Will Lischner, DO    albuterol 2 5 mg Nebulization Q6H PRN Will Lischner, DO    chlorhexidine 15 mL Swish & Spit Q12H [address] Caitlin Thorpe PA-C    heparin (porcine) 5,000 Units Subcutaneous [facility] Caitlin Thorpe PA-C    ibuprofen 600 mg Oral Q6H PRN Caitlin Thorpe PA-C    Labetalol HCl 20 mg Intravenous Q10 Min PRN Caitlin Thorpe PA-C    magnesium sulfate 2 g/hr Intravenous Continuous Ashwini Moctezuma MD Last Rate: 2 g/hr (09/09/19 2056)   melatonin 3 mg Oral HS Caitlin Thorpe PA-C    NIFEdipine 30 mg Oral Daily Caitlin Thorpe PA-C    oxyCODONE 5 mg Oral Q4H PRN Caitlin Thorpe PA-C    polyethylene glycol 17 g Oral Daily PRN Caitlin Thorpe PA-C    [START ON 9/11/2019] senna-docusate sodium 1 tablet Oral HS Caitlin Thorpe PA-C      Continuous Infusions:    magnesium sulfate 2 g/hr Last Rate: 2 g/hr (09/09/19 2056)     PRN Meds:    acetaminophen 650 mg Q4H PRN   albuterol 2 5 mg Q4H PRN   albuterol 2 5 mg Q6H PRN   ibuprofen 600 mg Q6H PRN   Labetalol HCl 20 mg Q10 Min PRN   oxyCODONE 5 mg Q4H PRN   polyethylene glycol 17 g Daily PRN       VTE Pharmacologic Prophylaxis: Sequential compression device (Venodyne)  and Heparin  VTE Mechanical Prophylaxis: sequential compression device    Invasive lines and devices: Invasive Devices     Peripheral Intravenous Line            Peripheral IV 09/09/19 Dorsal (posterior); Left Forearm less than 1 day    Peripheral IV 09/09/19 Right Antecubital less than 1 day                   Counseling / Coordination of Care  Total Critical Care time spent 34 minutes excluding procedures, teaching and family updates  Code Status: Level 1 - Full Code     Portions of the record may have been created with voice recognition software  Occasional wrong word or "sound a like" substitutions may have occurred due to the inherent limitations of voice recognition software  Read the chart carefully and recognize, using context, where substitutions have occurred       Ethyl Six, PA-C

## 2019-09-10 NOTE — DISCHARGE INSTRUCTIONS
Preeclampsia and Eclampsia After Delivery   WHAT YOU NEED TO KNOW:   What are preeclampsia and eclampsia? Preeclampsia is high blood pressure during pregnancy  You may also have protein in your urine  Preeclampsia can lead to eclampsia, a condition that causes one or more seizures  These conditions usually occur during pregnancy, but they can also occur up to 6 weeks after delivery  The risk is highest within a week after delivery  How are preeclampsia and eclampsia treated? Medicines may be given to lower your blood pressure, protect your organs, or prevent seizures  What do I need to know if I had preeclampsia or eclampsia during my pregnancy? Most of the time, preeclampsia and eclampsia go away after you deliver  You may continue to have symptoms for a period of time after you deliver  If you had severe preeclampsia during pregnancy, you may  need to do any of the following:  · See your healthcare provider several times during the week after delivery  He may check your blood pressure and order other tests  Your healthcare provider may ask you to check your blood pressure at home  · Continue to take certain medicines  for up to 6 weeks after delivery  Call 911 for the following: You have a seizure  When should I seek care immediately? · You develop a severe headache that does not go away  · You have new or increased vision changes, such as blurred or spotted vision  · You have new or increased swelling in your face or hands  · You have severe abdominal pain with nausea and vomiting  When should I contact my healthcare provider? · Your blood pressure is higher than you were told it should be  · You have questions or concerns about your condition or care  CARE AGREEMENT:   You have the right to help plan your care  Learn about your health condition and how it may be treated  Discuss treatment options with your caregivers to decide what care you want to receive   You always have the right to refuse treatment  The above information is an  only  It is not intended as medical advice for individual conditions or treatments  Talk to your doctor, nurse or pharmacist before following any medical regimen to see if it is safe and effective for you  © 2017 [employer] Information is for End User's use only and may not be sold, redistributed or otherwise used for commercial purposes  All illustrations and images included in CareNotes® are the copyrighted property of [employer]  or Wolfgang Tracy  Anemia   WHAT YOU NEED TO KNOW:   Anemia is a low number of red blood cells or a low amount of hemoglobin in your red blood cells  Hemoglobin is a protein that helps carry oxygen throughout your body  Red blood cells use iron to create hemoglobin  Anemia may develop if your body does not have enough iron  It may also develop if your body does not make enough red blood cells or they die faster than your body can make them  DISCHARGE INSTRUCTIONS:   Call 911 or have someone call 911 for any of the following:   · You lose consciousness  · You have severe chest pain  Return to the emergency department if:   · You have dark or bloody bowel movements  Contact your healthcare provider if:   · Your symptoms are worse, even after treatment  · You have questions or concerns about your condition or care  Medicines:   · Iron or folic acid supplements  help increase your red blood cell and hemoglobin levels  · Vitamin B12 injections  may help boost your red blood cell level and decrease your symptoms  Ask your healthcare provider how to inject B12  · Take your medicine as directed  Contact your healthcare provider if you think your medicine is not helping or if you have side effects  Tell him of her if you are allergic to any medicine  Keep a list of the medicines, vitamins, and herbs you take  Include the amounts, and when and why you take them   Bring the list or the pill bottles to follow-up visits  Carry your medicine list with you in case of an emergency  Prevent anemia:  Eat healthy foods rich in iron and vitamin C  Nuts, meat, dark leafy green vegetables, and beans are high in iron and protein  Vitamin C helps your body absorb iron  Foods rich in vitamin C include oranges and other citrus fruits  Ask your healthcare provider for a list of other foods that are high in iron or vitamin C  Ask if you need to be on a special diet  Follow up with your healthcare provider as directed:  Write down your questions so you remember to ask them during your visits  © 2017 [employer] Information is for End User's use only and may not be sold, redistributed or otherwise used for commercial purposes  All illustrations and images included in CareNotes® are the copyrighted property of [employer]  or [employer]gang [employer]  The above information is an  only  It is not intended as medical advice for individual conditions or treatments  Talk to your doctor, nurse or pharmacist before following any medical regimen to see if it is safe and effective for you

## 2019-09-10 NOTE — CONSULTS
Consult - Gynecology   David Richards 34 y o  female MRN: [id number]  Unit/Bed#:  Encounter: [id number]    Assessment/Plan     Assessment/Plam  1  Late onset postpartum preeclampsia - labs are normal  Patient reports no preeclampsia symptoms  Blood pressures have been below the severe range  Plan to discharge home with Procardia XL 30mg daily  Follow up office visit in 3-4 days  2  Acute Blood Loss anemia due to recent csection - stable  Recommend continued Iron, multivitamin, hydration and rest     3  Post op care - with questioning her lochia is within normal limits, her incision is healing well and her abdominal exam is normal  Continue ibuprofen and acetominophen for pain  If needed she does have oxycodone for PRN dosing  Consults          History of Present Illness   HPI:  David Richards is a 34 y o  female who presents with elevated blood pressure  Yesterday afternoon she began to take her blood pressure at home and was getting values of 160-180/100 at home  At this time her  brought her to the ER for evaluation  She also reports some increase in her postpartum bleeding and cramping over the last 24 hours  As well as dizzy feeling yesterday  Upon presentation to the ER her BP was in the severe range 180/100 and decision was made to keep her for further monitoring  She was given IV magnesium and antihypertensives  BPs have remained below 165/105 overnight  Labs are within the normal range with the exception of anemia which has remained stable with serial testing  Review of Systems   Constitutional: Positive for activity change and fatigue  Negative for appetite change, chills and fever  HENT: Negative for rhinorrhea, sneezing and sore throat  Eyes: Negative for visual disturbance  Respiratory: Negative for cough, shortness of breath and wheezing  Cardiovascular: Positive for leg swelling  Negative for chest pain and palpitations     Gastrointestinal: Negative for abdominal distention, abdominal pain, constipation, diarrhea, nausea and vomiting  Genitourinary: Positive for pelvic pain and vaginal bleeding  Negative for difficulty urinating  Neurological: Negative for syncope and light-headedness  Historical Information   Past Medical History:   Diagnosis Date    Anemia     Anemia during pregnancy   Anxiety     Asthma     Depression     Postpartum depression   Kidney infection     Kidney stone     Miscarriage     MRSA infection     Rh incompatibility     Urinary tract infection     Varicella     Pt said she had chicken pox twice when she was younger  Past Surgical History:   Procedure Laterality Date    ADENOIDECTOMY       SECTION      DILATION AND CURETTAGE OF UTERUS      DC  DELIVERY ONLY N/A 9/3/2019    Procedure:  SECTION () REPEAT;  Surgeon: Armond Hannah DO;  Location: [facility] ;  Service: Obstetrics    DC LIGATION,FALLOPIAN TUBE W/ Bilateral 9/3/2019    Procedure: LIGATION/COAGULATION TUBAL;  Surgeon: Armond Hannah DO;  Location: [facility] ;  Service: Obstetrics    TONSILLECTOMY       OB History    Para Term  AB Living   4 3 3   1 3   SAB TAB Ectopic Multiple Live Births   1     0 3      # Outcome Date GA Lbr Sahil/2nd Weight Sex Delivery Anes PTL Lv   4 Term 19 38w1d  3232 g (7 lb 2 oz) F CS-LTranv EPI, Spinal N PERI      Complications: Gestational HTN, third trimester   3 Term 17 39w0d   F CS-Unspec  N PERI      Birth Comments: Pt said that due to anatomy reasons, had another      2 SAB      SAB         Birth Comments: Had D&C at [place] [facility]   1 Term 11 42w0d  3487 g (7 lb 11 oz) M CS-Unspec EPI N PERI      Birth Comments: IOL, labor for 36hrs, went for emergency , pt said she didn't dilate     Family History   Problem Relation Age of Onset    No Known Problems Mother     Heart disease Father     Heart attack Father     Stroke Father  No Known Problems Daughter     No Known Problems Son     Diverticulitis Maternal Grandmother     Diabetes Maternal Grandfather     Aneurysm Maternal Grandfather     No Known Problems Paternal Grandmother     Heart disease Paternal Grandfather     Heart attack Paternal Grandfather     Heart disease Family     Crohn's disease Paternal Aunt      Social History   Social History     Substance and Sexual Activity   Alcohol Use No     Social History     Substance and Sexual Activity   Drug Use Not Currently    Types: Marijuana    Comment: last use approx 12/2018     Social History     Tobacco Use   Smoking Status Former Smoker    Packs/day: 0 00    Types: Cigarettes   Smokeless Tobacco Never Used   Tobacco Comment    approx 2 cigarettes       Meds/Allergies   bridget  Allergies   Allergen Reactions    Apple Anaphylaxis    Nuts Other (See Comments)     "itchy throat and difficulty swallowing "    Pineapple Anaphylaxis    Shellfish Allergy Other (See Comments)     "itchy throat and difficulty swallowing "    Shellfish-Derived Products Anaphylaxis    Other Hives and Rash     Black berries     Raspberry Hives and Rash    Penicillins Rash       Objective   Vitals: Blood pressure 126/84, pulse 101, temperature 97 9 °F (36 6 °C), temperature source Oral, resp  rate (!) 23, height 5' 2" (1 575 m), weight 88 5 kg (195 lb 1 7 oz), last menstrual period 12/11/2018, SpO2 98 %, currently breastfeeding  Intake/Output Summary (Last 24 hours) at 9/10/2019 1715  Last data filed at 9/10/2019 1200  Gross per 24 hour   Intake 2312 5 ml   Output --   Net 2312 5 ml       Invasive Devices: Invasive Devices     Peripheral Intravenous Line            Peripheral IV 09/09/19 Dorsal (posterior); Left Forearm less than 1 day    Peripheral IV 09/09/19 Right Antecubital less than 1 day                Physical Exam   Constitutional: She is oriented to person, place, and time  She appears well-developed and well-nourished   No distress  Cardiovascular: Normal rate, regular rhythm and normal heart sounds  Exam reveals no gallop and no friction rub  No murmur heard  Pulmonary/Chest: Effort normal and breath sounds normal  No respiratory distress  Abdominal: Soft  Bowel sounds are normal  She exhibits no distension  There is no tenderness  There is no rebound and no guarding  Incision c/d/i   Neurological: She is alert and oriented to person, place, and time  She has normal reflexes  Skin: She is not diaphoretic  Lab Results: I have personally reviewed pertinent reports      Recent Results (from the past 24 hour(s))   CBC and differential    Collection Time: 09/09/19  8:22 PM   Result Value Ref Range    WBC 10 89 (H) 4 31 - 10 16 Thousand/uL    RBC 3 12 (L) 3 81 - 5 12 Million/uL    Hemoglobin 8 7 (L) 11 5 - 15 4 g/dL    Hematocrit 27 4 (L) 34 8 - 46 1 %    MCV 88 82 - 98 fL    MCH 27 9 26 8 - 34 3 pg    MCHC 31 8 31 4 - 37 4 g/dL    RDW 14 0 11 6 - 15 1 %    MPV 10 2 8 9 - 12 7 fL    Platelets 305 528 - 272 Thousands/uL    nRBC 0 /100 WBCs   Comprehensive metabolic panel    Collection Time: 09/09/19  8:22 PM   Result Value Ref Range    Sodium 139 136 - 145 mmol/L    Potassium 3 3 (L) 3 5 - 5 3 mmol/L    Chloride 101 100 - 108 mmol/L    CO2 28 21 - 32 mmol/L    ANION GAP 10 4 - 13 mmol/L    BUN 7 5 - 25 mg/dL    Creatinine 0 73 0 60 - 1 30 mg/dL    Glucose 101 65 - 140 mg/dL    Calcium 9 1 8 3 - 10 1 mg/dL    AST 19 5 - 45 U/L    ALT 27 12 - 78 U/L    Alkaline Phosphatase 103 46 - 116 U/L    Total Protein 7 2 6 4 - 8 2 g/dL    Albumin 2 6 (L) 3 5 - 5 0 g/dL    Total Bilirubin 0 20 0 20 - 1 00 mg/dL    eGFR 112 ml/min/1 73sq m   Manual Differential(PHLEBS Do Not Order)    Collection Time: 09/09/19  8:22 PM   Result Value Ref Range    Segmented % 72 43 - 75 %    Lymphocytes % 24 14 - 44 %    Monocytes % 1 (L) 4 - 12 %    Eosinophils, % 2 0 - 6 %    Basophils % 0 0 - 1 %    Atypical Lymphocytes % 1 (H) <=0 %    Absolute Neutrophils 7 84 (H) 1 85 - 7 62 Thousand/uL    Lymphocytes Absolute 2 61 0 60 - 4 47 Thousand/uL    Monocytes Absolute 0 11 0 00 - 1 22 Thousand/uL    Eosinophils Absolute 0 22 0 00 - 0 40 Thousand/uL    Basophils Absolute 0 00 0 00 - 0 10 Thousand/uL    Total Counted 100     Anisocytosis Present     Hypochromia Present     Platelet Estimate Adequate Adequate   Phosphorus    Collection Time: 09/09/19  8:22 PM   Result Value Ref Range    Phosphorus 4 1 2 7 - 4 5 mg/dL   UA w Reflex to Microscopic w Reflex to Culture    Collection Time: 09/09/19  8:23 PM   Result Value Ref Range    Color, UA Light Yellow     Clarity, UA Clear     Specific [place], UA 1 010 1 003 - 1 030    pH, UA 7 5 4 5, 5 0, 5 5, 6 0, 6 5, 7 0, 7 5, 8 0    Leukocytes, UA Small (A) Negative    Nitrite, UA Negative Negative    Protein, UA Negative Negative mg/dl    Glucose, UA Negative Negative mg/dl    Ketones, UA Negative Negative mg/dl    Urobilinogen, UA 0 2 0 2, 1 0 E U /dl E U /dl    Bilirubin, UA Negative Negative    Blood, UA Large (A) Negative   Urine Microscopic    Collection Time: 09/09/19  8:23 PM   Result Value Ref Range    RBC, UA 4-10 (A) None Seen, 0-5 /hpf    WBC, UA 1-2 (A) None Seen, 0-5, 5-55, 5-65 /hpf    Epithelial Cells Occasional None Seen, Occasional /hpf    Bacteria, UA Occasional None Seen, Occasional /hpf   Protein / creatinine ratio, urine    Collection Time: 09/09/19  8:25 PM   Result Value Ref Range    Creatinine, Ur 27 7 mg/dL    Protein Urine Random 10 mg/dL    Prot/Creat Ratio, Ur 0 36 (H) 0 00 - 0 10   Fibrinogen    Collection Time: 09/09/19  9:01 PM   Result Value Ref Range    Fibrinogen 616 (H) 227 - 495 mg/dL   Protime-INR    Collection Time: 09/09/19  9:01 PM   Result Value Ref Range    Protime 11 8 11 6 - 14 5 seconds    INR 0 90 0 84 - 1 19   APTT    Collection Time: 09/09/19  9:01 PM   Result Value Ref Range    PTT 31 23 - 37 seconds   Protime-INR    Collection Time: 09/09/19 11:42 PM   Result Value Ref Range Protime 12 2 11 6 - 14 5 seconds    INR 0 91 0 84 - 1 19   APTT    Collection Time: 09/09/19 11:42 PM   Result Value Ref Range    PTT 31 23 - 37 seconds   Platelet count    Collection Time: 09/09/19 11:42 PM   Result Value Ref Range    Platelets 595 532 - 883 Thousands/uL    MPV 10 5 8 9 - 12 7 fL   Magnesium    Collection Time: 09/09/19 11:42 PM   Result Value Ref Range    Magnesium 3 8 (H) 1 6 - 2 6 mg/dL   Calcium, ionized    Collection Time: 09/09/19 11:42 PM   Result Value Ref Range    Calcium, Ionized 1 09 (L) 1 12 - 1 32 mmol/L   Magnesium    Collection Time: 09/10/19  5:39 AM   Result Value Ref Range    Magnesium 5 5 (HH) 1 6 - 2 6 mg/dL   CBC and differential    Collection Time: 09/10/19  5:39 AM   Result Value Ref Range    WBC 9 78 4 31 - 10 16 Thousand/uL    RBC 3 40 (L) 3 81 - 5 12 Million/uL    Hemoglobin 9 5 (L) 11 5 - 15 4 g/dL    Hematocrit 30 0 (L) 34 8 - 46 1 %    MCV 88 82 - 98 fL    MCH 27 9 26 8 - 34 3 pg    MCHC 31 7 31 4 - 37 4 g/dL    RDW 14 2 11 6 - 15 1 %    MPV 10 3 8 9 - 12 7 fL    Platelets 534 886 - 844 Thousands/uL    nRBC 0 /100 WBCs   Comprehensive metabolic panel    Collection Time: 09/10/19  5:39 AM   Result Value Ref Range    Sodium 138 136 - 145 mmol/L    Potassium 3 5 3 5 - 5 3 mmol/L    Chloride 101 100 - 108 mmol/L    CO2 26 21 - 32 mmol/L    ANION GAP 11 4 - 13 mmol/L    BUN 7 5 - 25 mg/dL    Creatinine 0 70 0 60 - 1 30 mg/dL    Glucose 109 65 - 140 mg/dL    Calcium 8 5 8 3 - 10 1 mg/dL    AST 20 5 - 45 U/L    ALT 23 12 - 78 U/L    Alkaline Phosphatase 110 46 - 116 U/L    Total Protein 7 7 6 4 - 8 2 g/dL    Albumin 2 8 (L) 3 5 - 5 0 g/dL    Total Bilirubin 0 20 0 20 - 1 00 mg/dL    eGFR 117 ml/min/1 73sq m   Manual Differential(PHLEBS Do Not Order)    Collection Time: 09/10/19  5:39 AM   Result Value Ref Range    Segmented % 70 43 - 75 %    Bands % 3 0 - 8 %    Lymphocytes % 19 14 - 44 %    Monocytes % 5 4 - 12 %    Eosinophils, % 0 0 - 6 %    Basophils % 1 0 - 1 % Metamyelocytes% 2 (H) 0 - 1 %    Absolute Neutrophils 7 14 1 85 - 7 62 Thousand/uL    Lymphocytes Absolute 1 86 0 60 - 4 47 Thousand/uL    Monocytes Absolute 0 49 0 00 - 1 22 Thousand/uL    Eosinophils Absolute 0 00 0 00 - 0 40 Thousand/uL    Basophils Absolute 0 10 0 00 - 0 10 Thousand/uL    Total Counted 100     Platelet Estimate Adequate Adequate   Magnesium    Collection Time: 09/10/19 10:14 AM   Result Value Ref Range    Magnesium 4 8 (H) 1 6 - 2 6 mg/dL         Counseling / Coordination of Care  Total floor / unit time spent today 25 minutes  Greater than 50% of total time was spent with the patient and / or family counseling and / or coordination of care  A description of the counseling / coordination of care:progression of gestational hypertension, preeclampsia, antihypertensives for treatment of BP for short term, anemia treatment and expected symptoms    Safe and effective use of medication

## 2019-09-10 NOTE — RESPIRATORY THERAPY NOTE
RT Protocol Note  Celina Chaney 34 y o  female MRN: [id number]  Unit/Bed#:  Encounter: [id number]    Assessment    Active Problems:    Preeclampsia      Home Pulmonary Medications:  Albuterol MDI PRN  Albuterol Neb PRN       Past Medical History:   Diagnosis Date    Anemia     Anemia during pregnancy   Anxiety     Asthma     Depression     Postpartum depression   Kidney infection     Kidney stone     Miscarriage     MRSA infection     Rh incompatibility     Urinary tract infection     Varicella     Pt said she had chicken pox twice when she was younger       Social History     Socioeconomic History    Marital status: Single     Spouse name: None    Number of children: None    Years of education: None    Highest education level: None   Occupational History    None   Social Needs    Financial resource strain: None    Food insecurity:     Worry: None     Inability: None    Transportation needs:     Medical: None     Non-medical: None   Tobacco Use    Smoking status: Former Smoker     Packs/day: 0 00     Types: Cigarettes    Smokeless tobacco: Never Used    Tobacco comment: approx 2 cigarettes   Substance and Sexual Activity    Alcohol use: No    Drug use: Not Currently     Types: Marijuana     Comment: last use approx 12/2018    Sexual activity: Yes     Partners: Male     Birth control/protection: None   Lifestyle    Physical activity:     Days per week: None     Minutes per session: None    Stress: None   Relationships    Social connections:     Talks on phone: None     Gets together: None     Attends [employer] service: None     Active member of club or organization: None     Attends meetings of clubs or organizations: None     Relationship status: None    Intimate partner violence:     Fear of current or ex partner: None     Emotionally abused: None     Physically abused: None     Forced sexual activity: None   Other Topics Concern    None   Social History Narrative    None       Subjective         Objective    Physical Exam:   Assessment Type: Assess only  General Appearance: Alert, Awake  Respiratory Pattern: Normal  Chest Assessment: Chest expansion symmetrical  Bilateral Breath Sounds: Clear    Vitals:  Blood pressure 151/90, pulse 77, temperature 98 4 °F (36 9 °C), temperature source Oral, resp  rate 20, height 5' 2" (1 575 m), weight 88 5 kg (195 lb 1 7 oz), last menstrual period 12/11/2018, SpO2 98 %, currently breastfeeding  Imaging and other studies: I have personally reviewed pertinent reports  Plan    Respiratory Plan: Home Bronchodilator Patient pathway        Resp Comments: PT awake and alert in no apparent resp distress at this time, Pt states she has a hisotry of well controlled asthma and has not had to use her neb or inhaler for the last 6 months or so  pt states she is a former smoker who did smoke for 10 yrs  Pt has no adverse lung sounds at this time will continue with PRN breathing tx at this time

## 2019-09-10 NOTE — DISCHARGE SUMMARY
Discharge Summary - Gely Candelario 34 y o  female MRN: [id number]    Unit/Bed#:  Encounter: [id number]    Admission Date:   Admission Orders (From admission, onward)     Ordered        19  Inpatient Admission  Once                     Admitting Diagnosis:   Pre-eclampsia [O14 90]  Hypertension [I10]    HPI:   Per [place] Shoulder PA-C   "Gely Candelario is a 34 y o  female w PMH anxiety, depression, asthma who is admitted to the ICU w preeclampsia  Patient [id number] who is 6 days post partum s/p  section, b/l salpingectomy  She had been monitored closely in late pregnancy for gestational HTN   is well and at home  She had been doing well post-operatively however over the past two days had an increase in her cramping and lower abdominal pain and has been taking percocet infrequently for pain along with motrin  Has been passing vaginal clots, largest are the size of a golf ball  She has also noted edema of her lower extremities that she had not experienced prior to delivery  Today she started to feel woozy, described feeling slightly dizzy and nauseous, felt like her R eye was twitching and developed a slight headache  Her  checked her BP which was 102D systolic and brought her to the ED for evaluation  She did have some RUQ earlier that has resolved at time of my evaluation  "        Procedures Performed: No orders of the defined types were placed in this encounter  Summary of Hospital Course:   Patient received 1 time dose of 10 mg of hydralazine while in the emergency department with good response  On-call OBGYN was contacted to discuss case and make recommendations  Patient was started on magnesium infusion with good response  Neurological symptoms completely resolved overnight  She was initiated on nifedipine 30 mg daily as maintenance medication  This morning magnesium infusion was weaned off    She was seen by Dr Serna as inpatient in cleared for discharge to home  Patient has been provided prescription for nifedipine XL 30 mg daily  She will follow up as an outpatient in office in approximately 3-4 days for blood pressure check  Signs and symptoms that would warrant emergent return were discussed with patient  She is agreeable to plan  Patient will be discharged home in stable condition  Significant Findings, Care, Treatment and Services Provided: N/A    Complications:   N/A    Discharge Diagnosis:   Principal Problem:    Preeclampsia      Resolved Problems  Date Reviewed: 9/10/2019    None          Condition at Discharge: stable         Discharge instructions/Information to patient and family:   See after visit summary for information provided to patient and family  Provisions for Follow-Up Care:  See after visit summary for information related to follow-up care and any pertinent home health orders  PCP: Armando Sandoval MD    Disposition: Home    Planned Readmission: No      Discharge Statement   I spent 15 minutes discharging the patient  This time was spent on the day of discharge  I had direct contact with the patient on the day of discharge  Additional documentation is required if more than 30 minutes were spent on discharge  Discharge Medications:  See after visit summary for reconciled discharge medications provided to patient and family

## 2019-09-10 NOTE — UTILIZATION REVIEW
Initial Clinical Review    Admission: Date/Time/Statement: Inpatient Admission Orders (From admission, onward)     Ordered        09/09/19 2147  Inpatient Admission  Once                   Orders Placed This Encounter   Procedures    Inpatient Admission     Standing Status:   Standing     Number of Occurrences:   1     Order Specific Question:   Admitting Physician     Answer:   Luh Unger [[id number]]     Order Specific Question:   Level of Care     Answer:   Med Surg [[id number]]     Order Specific Question:   Estimated length of stay     Answer:   More than 2 Midnights     Order Specific Question:   Certification     Answer:   I certify that inpatient services are medically necessary for this patient for a duration of greater than two midnights  See H&P and MD Progress Notes for additional information about the patient's course of treatment  ED Arrival Information     Expected Arrival Acuity Means of Arrival Escorted By Service Admission Type    - 9/9/2019 19:49 Emergent Walk-In [facility] General Medicine Emergency    Arrival Complaint    HIGH BLOOD PRESSURE        Chief Complaint   Patient presents with    Hypertension     pt with recent c section d/t eclampsia  pt has been "feeling really shitty, my blood pressure was 171/106 at home " c/o headache, unable to focus, feeling hot, b/l leg swelling  Assessment/Plan: 34year old female to the ED from home with complaints of high blood pressure at home and headache  Admitted to critical care for preeclampsia  She is 6 days post partum c cesection, b/l alpingectomy  Has been monitored closesy in late pregnancy for gestation htn   2 days ago, she started with increase in abdominal cramping  Today starting to feel woozy, dizzy, nauseated  She has pelvic pain, vaginal bleeding, dizziness, headache  Mild ttp of rightlower quad  GCs=15, Moderate pitting edema from feet to knees, trace edema just above knees    Continuously run mag IV   seizure precau, q 1 hour neuro checks  Lungs are clear  Currently breastfeeding  Treat bp with prn IV labetalol as needed  Has required I dose IV Labetolol     ED Triage Vitals   Temperature Pulse Respirations Blood Pressure SpO2   09/09/19 2000 09/09/19 2000 09/09/19 2000 09/09/19 2000 09/09/19 2000   98 2 °F (36 8 °C) 77 18 (!) 182/103 100 %      Temp Source Heart Rate Source Patient Position - Orthostatic VS BP Location FiO2 (%)   09/09/19 2000 09/09/19 2000 09/09/19 2000 09/09/19 2000 --   Oral Monitor Sitting Left arm       Pain Score       09/10/19 0155       7        Wt Readings from Last 1 Encounters:   09/10/19 88 5 kg (195 lb 1 7 oz)     Additional Vital Signs:   /Time Temp Pulse Resp BP MAP (mmHg) SpO2 O2 Device Patient Position - Orthostatic VS   09/10/19 1300 -- 94 20 125/69 91 98 % -- --   09/10/19 0900 -- 92 21 126/79 98 98 % -- --   09/10/19 0800 97 7 °F (36 5 °C) 81 20 142/71 96 99 % None (Room air) Lying   09/10/19 0700 -- 79 18 117/66 87 98 % -- --   09/10/19 0600 -- 84 25Abnormal  109/60 79 99 % -- --   09/10/19 0530 -- 76 22 104/71 83 97 % -- --   09/10/19 0500 -- 74 14 101/52 73 97 % -- --   09/10/19 0430 -- 74 14 108/52 75 98 % -- --   09/10/19 0330 -- 71 18 108/57 76 97 % -- --   09/10/19 0300 98 6 °F (37 °C) 79 20 130/83 101 98 % -- --   09/10/19 0100 -- 77 20 151/90 113 94 % -- --   09/10/19 0030 -- 80 20 159/91 119 99 % -- --   09/10/19 0000 -- 74 18 152/93 118        Pertinent Labs/Diagnostic Test Results:     Results from last 7 days   Lab Units 09/10/19  0539 09/09/19 2342 09/09/19 2022 09/04/19  0556 09/03/19  1542   WBC Thousand/uL 9 78  --  10 89* 15 87* 9 21   HEMOGLOBIN g/dL 9 5*  --  8 7* 7 8* 9 6*   HEMATOCRIT % 30 0*  --  27 4* 24 6* 29 4*   PLATELETS Thousands/uL 259 229 257 174 192   NEUTROS ABS Thousands/µL  --   --   --  13 66*  --    BANDS PCT % 3  --   --   --   --          Results from last 7 days   Lab Units 09/10/19  1014 09/10/19  0539 09/09/19 2342 09/09/19 2022 09/04/19  2758 09/03/19  1621   SODIUM mmol/L  --  138  --  139 141 142   POTASSIUM mmol/L  --  3 5  --  3 3* 3 5 3 6   CHLORIDE mmol/L  --  101  --  101 109* 110*   CO2 mmol/L  --  26  --  28 24 21   ANION GAP mmol/L  --  11  --  10 8 11   BUN mg/dL  --  7  --  7 5 4*   CREATININE mg/dL  --  0 70  --  0 73 0 57* 0 64   EGFR ml/min/1 73sq m  --  117  --  112 126 121   CALCIUM mg/dL  --  8 5  --  9 1 8 7 9 4   CALCIUM, IONIZED mmol/L  --   --  1 09*  --   --   --    MAGNESIUM mg/dL 4 8* 5 5* 3 8*  --   --   --    PHOSPHORUS mg/dL  --   --   --  4 1  --   --      Results from last 7 days   Lab Units 09/10/19  0539 09/09/19  2022 09/04/19  0556 09/03/19  1621   AST U/L 20 19 14 15   ALT U/L 23 27 12 18   ALK PHOS U/L 110 103 87 120*   TOTAL PROTEIN g/dL 7 7 7 2 5 5* 6 5   ALBUMIN g/dL 2 8* 2 6* 2 2* 3 0*   TOTAL BILIRUBIN mg/dL 0 20 0 20 0 23 0 26         Results from last 7 days   Lab Units 09/10/19  0539 09/09/19  2022 09/04/19  0556 09/03/19  1621   GLUCOSE RANDOM mg/dL 109 101 96 68       Results from last 7 days   Lab Units 09/09/19 2025 09/09/19 2023 09/03/19  1613   CLARITY UA   --  Clear Clear   COLOR UA   --  Light Yellow Yellow   SPEC GRAV UA   --  1 010 1 004   PH UA   --  7 5 7 5   GLUCOSE UA mg/dl  --  Negative Negative   KETONES UA mg/dl  --  Negative Negative   BLOOD UA   --  Large* Negative   PROTEIN UA mg/dl  --  Negative Negative   NITRITE UA   --  Negative Negative   BILIRUBIN UA   --  Negative Negative   UROBILINOGEN UA E U /dl  --  0 2 0 2   LEUKOCYTES UA   --  Small* Negative   WBC UA /hpf  --  1-2*  --    RBC UA /hpf  --  4-10*  --    BACTERIA UA /hpf  --  Occasional  --    EPITHELIAL CELLS WET PREP /hpf  --  Occasional  --    CREATININE UR mg/dL 27 7  --  46 3   PROTEIN UR mg/dL 10  --  9   PROT/CREAT RATIO UR  0 36*  --  0 19*         Results from last 7 days   Lab Units 09/03/19  4258   AMPH/METH  Negative   BARBITURATE UR  Negative   BENZODIAZEPINE UR  Negative   COCAINE UR  Negative   METHADONE URINE Negative   OPIATE UR  Positive*   PCP UR  Negative   THC UR  Negative       Results from last 7 days   Lab Units 09/10/19  0539 09/09/19 2022   TOTAL COUNTED  100 100     ED Treatment:   Medication Administration from 09/09/2019 1948 to 09/09/2019 2249       Date/Time Order Dose Route Action     09/09/2019 2031 magnesium sulfate 2 g/50 mL IVPB (premix) 2 g 2 g Intravenous New Bag     09/09/2019 2028 Labetalol HCl (NORMODYNE) injection 10 mg 10 mg Intravenous Given     09/09/2019 2056 magnesium sulfate 20 g/500 mL infusion (premix) 2 g/hr Intravenous New Bag        Past Medical History:   Diagnosis Date    Anemia     Anemia during pregnancy   Anxiety     Asthma     Depression     Postpartum depression   Kidney infection     Kidney stone     Miscarriage     MRSA infection     Rh incompatibility     Urinary tract infection     Varicella     Pt said she had chicken pox twice when she was younger  Admitting Diagnosis: Pre-eclampsia [O14 90]  Hypertension [I10]  Age/Sex: 34 y o  female  Admission Orders:  I/O every 2 hours  Up and OOBs  SCDS  Current Facility-Administered Medications:  acetaminophen 650 mg Oral Q4H PRN   albuterol 2 5 mg Nebulization Q4H PRN   albuterol 2 5 mg Nebulization Q6H PRN   chlorhexidine 15 mL Swish & Spit Q12H [address]   heparin (porcine) 5,000 Units Subcutaneous Q8H [address]   ibuprofen 600 mg Oral Q6H PRN   Labetalol HCl 20 mg Intravenous Q10 Min PRN 10 mgx1   melatonin 3 mg Oral HS   NIFEdipine 30 mg Oral Daily   oxyCODONE 5 mg Oral Q4H PRN   polyethylene glycol 17 g Oral Daily PRN   potassium chloride 20 mEq Oral Once   [START ON 9/11/2019] senna-docusate sodium 1 tablet Oral HS       IP CONSULT TO CASE MANAGEMENT  IP CONSULT TO OB GYN    Network Utilization Review Department  Phone: [phone]; Fax [phone]  [email or web address]  ATTENTION: Please call with any questions or concerns to [phone]  and carefully listen to the prompts so that you are directed to the right person  Send all requests for admission clinical reviews, approved or denied determinations and any other requests to fax [phone]   All voicemails are confidential

## 2019-09-10 NOTE — NURSING NOTE
AVS reviewed with pt and her mother, questions answered  Pt discharged ambulatory with all belongings

## 2019-09-11 LAB — PLACENTA IN STORAGE: NORMAL

## 2019-09-11 NOTE — UTILIZATION REVIEW
Notification of Discharge  This is a Notification of Discharge from our facility [address]  Please be advised that this patient has been discharge from our facility  Below you will find the admission and discharge date and time including the patients disposition  PRESENTATION DATE: 9/9/2019  8:03 PM  OBS ADMISSION DATE:   IP ADMISSION DATE: 9/9/19 2147   DISCHARGE DATE: 9/10/2019  6:04 PM  DISPOSITION: Home/Self Care Home/Self Care   Admission Orders listed below:  Admission Orders (From admission, onward)     Ordered        09/09/19 2147  Inpatient Admission  Once                   Please contact the UR Department if additional information is required to close this patient's authorization/case  [address]  Utilization Review Department  Phone: [phone]; Fax [phone]  [email or web address]  ATTENTION: Please call with any questions or concerns to [phone]  and carefully listen to the prompts so that you are directed to the right person  Send all requests for admission clinical reviews, approved or denied determinations and any other requests to fax [phone]   All voicemails are confidential

## 2019-09-16 ENCOUNTER — OFFICE VISIT (OUTPATIENT)
Dept: OBGYN CLINIC | Facility: CLINIC | Age: 29
End: 2019-09-16

## 2019-09-16 VITALS
DIASTOLIC BLOOD PRESSURE: 82 MMHG | SYSTOLIC BLOOD PRESSURE: 120 MMHG | BODY MASS INDEX: 33.86 KG/M2 | WEIGHT: 184 LBS | HEIGHT: 62 IN

## 2019-09-16 PROBLEM — Z67.91 RH NEGATIVE STATUS IN SINGLETON PREGNANCY IN SECOND TRIMESTER: Status: RESOLVED | Noted: 2019-05-28 | Resolved: 2019-09-16

## 2019-09-16 PROBLEM — O99.210 OBESITY AFFECTING PREGNANCY, ANTEPARTUM: Status: RESOLVED | Noted: 2019-08-07 | Resolved: 2019-09-16

## 2019-09-16 PROBLEM — J45.909 ASTHMA COMPLICATING PREGNANCY IN THIRD TRIMESTER: Status: RESOLVED | Noted: 2019-03-18 | Resolved: 2019-09-16

## 2019-09-16 PROBLEM — O26.892 RH NEGATIVE STATUS IN SINGLETON PREGNANCY IN SECOND TRIMESTER: Status: RESOLVED | Noted: 2019-05-28 | Resolved: 2019-09-16

## 2019-09-16 PROBLEM — O99.513 ASTHMA COMPLICATING PREGNANCY IN THIRD TRIMESTER: Status: RESOLVED | Noted: 2019-03-18 | Resolved: 2019-09-16

## 2019-09-16 PROBLEM — O99.612 CONSTIPATION DURING PREGNANCY IN SECOND TRIMESTER: Status: RESOLVED | Noted: 2019-03-18 | Resolved: 2019-09-16

## 2019-09-16 PROBLEM — Z98.891 S/P REPEAT LOW TRANSVERSE C-SECTION: Status: RESOLVED | Noted: 2019-09-03 | Resolved: 2019-09-16

## 2019-09-16 PROBLEM — Z90.79 STATUS POST BILATERAL SALPINGECTOMY: Status: RESOLVED | Noted: 2019-09-03 | Resolved: 2019-09-16

## 2019-09-16 PROBLEM — K59.00 CONSTIPATION DURING PREGNANCY IN SECOND TRIMESTER: Status: RESOLVED | Noted: 2019-03-18 | Resolved: 2019-09-16

## 2019-09-16 PROBLEM — O13.3 GESTATIONAL HTN, THIRD TRIMESTER: Status: RESOLVED | Noted: 2019-09-03 | Resolved: 2019-09-16

## 2019-09-16 PROBLEM — O14.90 PREECLAMPSIA: Status: RESOLVED | Noted: 2019-09-10 | Resolved: 2019-09-16

## 2019-09-16 PROCEDURE — 99024 POSTOP FOLLOW-UP VISIT: CPT | Performed by: OBSTETRICS & GYNECOLOGY

## 2019-09-16 NOTE — ASSESSMENT & PLAN NOTE
Procardia started in the hospital   Will discontinue in one week then have follow up visit      Denies headache, changes in vision, paraesthesias, n/v

## 2019-09-16 NOTE — PROGRESS NOTES
Assessment/Plan:    Preeclampsia in postpartum period  Procardia started in the hospital   Will discontinue in one week then have follow up visit  Denies headache, changes in vision, paraesthesias, n/v       There are no diagnoses linked to this encounter  Subjective:      Patient ID: Kee Cook is a 34 y o  female  Patient here for new patient ER follow up started on medicine for high blood pressure  Follow up from hosptial admission/observation due to elevated blood pressure one week after delivery  Labs did not indicate preeclampsia but were in the severe range  She was treated with magnesium for seizure prophylaxis  Started on Procardia XL 30 daily to control hypertension  Today, denies preeclampsia symptoms  Some tearfulness but denies depression or anxiety  Good support at home  Denies irritability, anger, SI/HI  Bleeding has slowed down, only sees blood when she urinates  Gynecologic Exam   The patient's primary symptoms include vaginal bleeding  The patient's pertinent negatives include no genital itching, genital lesions, genital odor, genital rash, pelvic pain or vaginal discharge  The patient is experiencing no pain  Pertinent negatives include no chills, constipation, diarrhea, fever, nausea, sore throat or vomiting  The following portions of the patient's history were reviewed and updated as appropriate:   She  has a past medical history of Anemia, Anxiety, Asthma, Depression, Kidney infection, Kidney stone, Miscarriage, MRSA infection, Preeclampsia (9/10/2019), Rh incompatibility, Urinary tract infection, and Varicella  She   Patient Active Problem List    Diagnosis Date Noted    Preeclampsia in postpartum period 2019    Anxiety 2018     She  has a past surgical history that includes  section;  Tonsillectomy; ADENOIDECTOMY; Dilation and curettage of uterus (); pr  delivery only (N/A, 9/3/2019); and pr ligation,fallopian tube w/ (Bilateral, 9/3/2019)  Her family history includes Aneurysm in her maternal grandfather; Crohn's disease in her paternal aunt; Diabetes in her maternal grandfather; Diverticulitis in her maternal grandmother; Heart attack in her father and paternal grandfather; Heart disease in her family, father, and paternal grandfather; No Known Problems in her daughter, mother, paternal grandmother, and son; Stroke in her father  She  reports that she has never smoked  She has never used smokeless tobacco  She reports that she has current or past drug history  Drug: Marijuana  She reports that she does not drink alcohol    Current Outpatient Medications   Medication Sig Dispense Refill    montelukast (SINGULAIR) 10 mg tablet Take 1 tablet (10 mg total) by mouth daily at bedtime 30 tablet 0    NIFEdipine ER (ADALAT CC) 30 MG 24 hr tablet Take 1 tablet (30 mg total) by mouth daily 30 tablet 0    Prenatal Vit-Fe Fumarate-FA (PRENATAL VITAMIN PO) Take by mouth      acetaminophen (TYLENOL) 325 mg tablet Take 2 tablets (650 mg total) by mouth every 4 (four) hours as needed for mild pain, moderate pain, severe pain, headaches or fever (Patient not taking: Reported on 2019) 60 tablet 3    albuterol (2 5 mg/3 mL) 0 083 % nebulizer solution Take 3 mL by nebulization every 6 (six) hours as needed for wheezing (Patient not taking: Reported on 2019) 75 mL 0    albuterol (PROVENTIL HFA,VENTOLIN HFA) 90 mcg/act inhaler Inhale 2 puffs every 4 (four) hours as needed for wheezing (Patient not taking: Reported on 2019) 1 Inhaler 0    docusate sodium (COLACE) 100 mg capsule Take 1 capsule (100 mg total) by mouth 2 (two) times a day as needed for constipation (Patient not taking: Reported on 2019) 90 capsule 1    ferrous sulfate 324 (65 Fe) mg Take 1 tablet (324 mg total) by mouth 2 (two) times a day before meals (Patient not taking: Reported on 2019) 60 tablet 1    ibuprofen (MOTRIN) 600 mg tablet Take 1 tablet (600 mg total) by mouth every 6 (six) hours as needed for mild pain, moderate pain, fever or headaches (cramping) (Patient not taking: Reported on 9/16/2019) 60 tablet 3    oxyCODONE (ROXICODONE) 5 mg immediate release tablet Take 1-2 tablets (5-10 mg total) by mouth every 4 (four) hours as needed for severe pain for up to 10 daysMax Daily Amount: 30 mg (Patient not taking: Reported on 9/16/2019) 15 tablet 0    simethicone (MYLICON) 80 mg chewable tablet Chew 1 tablet (80 mg total) every 6 (six) hours as needed for flatulence (Patient not taking: Reported on 9/9/2019) 30 tablet 0     No current facility-administered medications for this visit        Current Outpatient Medications on File Prior to Visit   Medication Sig    montelukast (SINGULAIR) 10 mg tablet Take 1 tablet (10 mg total) by mouth daily at bedtime    NIFEdipine ER (ADALAT CC) 30 MG 24 hr tablet Take 1 tablet (30 mg total) by mouth daily    Prenatal Vit-Fe Fumarate-FA (PRENATAL VITAMIN PO) Take by mouth    acetaminophen (TYLENOL) 325 mg tablet Take 2 tablets (650 mg total) by mouth every 4 (four) hours as needed for mild pain, moderate pain, severe pain, headaches or fever (Patient not taking: Reported on 9/9/2019)    albuterol (2 5 mg/3 mL) 0 083 % nebulizer solution Take 3 mL by nebulization every 6 (six) hours as needed for wheezing (Patient not taking: Reported on 9/16/2019)    albuterol (PROVENTIL HFA,VENTOLIN HFA) 90 mcg/act inhaler Inhale 2 puffs every 4 (four) hours as needed for wheezing (Patient not taking: Reported on 9/16/2019)    docusate sodium (COLACE) 100 mg capsule Take 1 capsule (100 mg total) by mouth 2 (two) times a day as needed for constipation (Patient not taking: Reported on 8/21/2019)    ferrous sulfate 324 (65 Fe) mg Take 1 tablet (324 mg total) by mouth 2 (two) times a day before meals (Patient not taking: Reported on 9/16/2019)    ibuprofen (MOTRIN) 600 mg tablet Take 1 tablet (600 mg total) by mouth every 6 (six) hours as needed for mild pain, moderate pain, fever or headaches (cramping) (Patient not taking: Reported on 9/16/2019)    oxyCODONE (ROXICODONE) 5 mg immediate release tablet Take 1-2 tablets (5-10 mg total) by mouth every 4 (four) hours as needed for severe pain for up to 10 daysMax Daily Amount: 30 mg (Patient not taking: Reported on 9/16/2019)    simethicone (MYLICON) 80 mg chewable tablet Chew 1 tablet (80 mg total) every 6 (six) hours as needed for flatulence (Patient not taking: Reported on 9/9/2019)     No current facility-administered medications on file prior to visit  She is allergic to apple; nuts; pineapple; shellfish allergy; shellfish-derived products; other; raspberry; and penicillins       Review of Systems   Constitutional: Negative for activity change, appetite change, chills, fatigue and fever  HENT: Negative for rhinorrhea, sneezing and sore throat  Eyes: Negative for visual disturbance  Respiratory: Negative for cough, shortness of breath and wheezing  Cardiovascular: Negative for chest pain, palpitations and leg swelling  Gastrointestinal: Negative for abdominal distention, constipation, diarrhea, nausea and vomiting  Genitourinary: Negative for difficulty urinating, pelvic pain and vaginal discharge  Neurological: Negative for syncope and light-headedness  Objective:      /82 (BP Location: Right arm, Patient Position: Sitting, Cuff Size: Standard)   Ht 5' 2" (1 575 m)   Wt 83 5 kg (184 lb)   LMP 12/11/2018 (Approximate)   Breastfeeding?  No   BMI 33 65 kg/m²          Physical Exam

## 2019-09-16 NOTE — PATIENT INSTRUCTIONS
Postpartum Depression   WHAT YOU NEED TO KNOW:   What is postpartum depression? Postpartum depression is a mood disorder that occurs after giving birth  A mood is an emotion or a feeling  Moods affect your behavior and how you feel about yourself and life in general  Depression is a sad mood that you cannot control  Women often feel sad, afraid, or nervous after their baby is born  These feelings are called postpartum blues or baby blues, and they usually go away in 1 to 2 weeks  With postpartum depression, these symptoms get worse and continue for more than 2 weeks  Postpartum depression is a serious condition that affects your daily activities and relationships  What causes postpartum depression? Healthcare providers do not know exactly what causes postpartum depression  It may be caused by a sudden drop in hormone levels after childbirth  A previous episode of postpartum depression or a family history of depression may increase your risk  Several things may trigger postpartum depression:  · Lack of support from the baby's father or other family members    · Feeling more tired than usual    · Stress, a poor diet, or lack of sleep    · Pain after childbirth or pain during breastfeeding    · Sudden change in lifestyle  How is postpartum depression diagnosed? Postpartum depression affects your daily activities and your relationships with other people  Healthcare providers will ask you questions about your signs and symptoms and how they are affecting your life  The symptoms of postpartum depression usually begin within 1 month after childbirth  You feel depressed or lose interest in activities you enjoy nearly every day for at least 2 weeks  You also have 4 or more of the following symptoms:  · You feel tired or have less energy than usual      · You feel unimportant or guilty most of the time  · You think about hurting or killing yourself  · Your appetite changes   You may lose your appetite and lose weight without trying  Your appetite may also increase and you may gain weight  · You are restless, irritable, or withdrawn  · You have trouble concentrating and remembering things  You have trouble doing daily tasks or making decisions  · You have trouble sleeping, even after the baby is asleep  How is postpartum depression treated? · Psychotherapy:  During therapy, you will talk with healthcare providers about how to cope with your feelings and moods  This can be done alone or in a group  It may also be done with family members or your partner  · Antidepressants: This medicine is given to decrease or stop the symptoms of depression  You usually need to take antidepressants for several weeks before you begin to feel better  Do not stop taking antidepressants unless your healthcare provider tells you to  Healthcare providers may try a different antidepressant if one type does not work  What can I do to feel better? · Rest:  Do not try to do everything all at the same time  Do only what is needed and let other things wait until later  Ask your family or friends for help, especially if you have other children  Ask your partner to help with night feedings or other baby care  Try to sleep when the baby naps  · Get emotional support:  Share your feelings with your partner, a friend, or another mother  · Take care of yourself:  Shower and dress each day  Do not skip meals  Try to get out of the house a little each day  Get regular exercise  Eat a healthy diet  Avoid alcohol because it can make your depression worse  Do not isolate yourself  Go for a walk or meet with a friend  It is also important that you have some time by yourself each day  How do I find support and more information?    · [address] [place] [address], Public Information & Communication Branch  [address] [place] [address] W, [address] [facility], [address]  [place] [address]   Phone: [phone]  Phone: [phone]  Web Address: Bhumika oh  When should I contact my healthcare provider? · You cannot make it to your next visit  · Your depression does not get better with treatment or it gets worse  · You have questions or concerns about your condition or care  When should I seek immediate care or call 911? · You think about hurting or killing yourself, your baby, or someone else  · You feel like other people want to hurt you  · You hear voices telling you to hurt yourself or your baby  CARE AGREEMENT:   You have the right to help plan your care  Learn about your health condition and how it may be treated  Discuss treatment options with your caregivers to decide what care you want to receive  You always have the right to refuse treatment  The above information is an  only  It is not intended as medical advice for individual conditions or treatments  Talk to your doctor, nurse or pharmacist before following any medical regimen to see if it is safe and effective for you  © 2017 [address] [employer] Information is for End User's use only and may not be sold, redistributed or otherwise used for commercial purposes  All illustrations and images included in CareNotes® are the copyrighted property of [employer]  or Wolfgang Tracy  S/P OR on 12/23.

## 2019-09-18 NOTE — ED PROVIDER NOTES
History  Chief Complaint   Patient presents with    Hypertension     pt with recent c section d/t eclampsia  pt has been "feeling really shitty, my blood pressure was 171/106 at home " c/o headache, unable to focus, feeling hot, b/l leg swelling  34year old female presenting to the emergency department for eval of hypertension/possible post partem pre-eclampsia  Pt recently delivered a healthy baby via cesarian section at 40 WGA for pregestational hypertension  Had been doing well and BPs were under control until today when she developed headache/malaise and high BPs at home  Called OBGYN and was recommended to come to the emergency room for further eval   No fevers or chills, no problems or pain with surgical site  Prior to Admission Medications   Prescriptions Last Dose Informant Patient Reported? Taking?    Prenatal Vit-Fe Fumarate-FA (PRENATAL VITAMIN PO) Not Taking at Unknown time Self Yes No   Sig: Take by mouth   acetaminophen (TYLENOL) 325 mg tablet Not Taking at Unknown time Self No No   Sig: Take 2 tablets (650 mg total) by mouth every 4 (four) hours as needed for mild pain, moderate pain, severe pain, headaches or fever   Patient not taking: Reported on 9/9/2019   albuterol (2 5 mg/3 mL) 0 083 % nebulizer solution  Self No Yes   Sig: Take 3 mL by nebulization every 6 (six) hours as needed for wheezing   Patient not taking: Reported on 9/16/2019   albuterol (PROVENTIL HFA,VENTOLIN HFA) 90 mcg/act inhaler  Self No Yes   Sig: Inhale 2 puffs every 4 (four) hours as needed for wheezing   Patient not taking: Reported on 9/16/2019   docusate sodium (COLACE) 100 mg capsule Not Taking at Unknown time Self No No   Sig: Take 1 capsule (100 mg total) by mouth 2 (two) times a day as needed for constipation   Patient not taking: Reported on 8/21/2019   ferrous sulfate 324 (65 Fe) mg  Self No Yes   Sig: Take 1 tablet (324 mg total) by mouth 2 (two) times a day before meals   Patient not taking: Reported on 2019   ibuprofen (MOTRIN) 600 mg tablet  Self No Yes   Sig: Take 1 tablet (600 mg total) by mouth every 6 (six) hours as needed for mild pain, moderate pain, fever or headaches (cramping)   Patient not taking: Reported on 2019   montelukast (SINGULAIR) 10 mg tablet Not Taking at Unknown time Self No No   Sig: Take 1 tablet (10 mg total) by mouth daily at bedtime   oxyCODONE (ROXICODONE) 5 mg immediate release tablet  Self No Yes   Sig: Take 1-2 tablets (5-10 mg total) by mouth every 4 (four) hours as needed for severe pain for up to 10 daysMax Daily Amount: 30 mg   Patient not taking: Reported on 2019   simethicone (MYLICON) 80 mg chewable tablet Not Taking at Unknown time Self No No   Sig: Chew 1 tablet (80 mg total) every 6 (six) hours as needed for flatulence   Patient not taking: Reported on 2019      Facility-Administered Medications: None       Past Medical History:   Diagnosis Date    Anemia     Anemia during pregnancy   Anxiety     Asthma     Depression     Postpartum depression   Kidney infection     Kidney stone     Miscarriage     MRSA infection     Preeclampsia 9/10/2019    Rh incompatibility     Urinary tract infection     Varicella     Pt said she had chicken pox twice when she was younger         Past Surgical History:   Procedure Laterality Date    ADENOIDECTOMY       SECTION      DILATION AND CURETTAGE OF UTERUS      ID  DELIVERY ONLY N/A 9/3/2019    Procedure:  SECTION () REPEAT;  Surgeon: Zay Boateng DO;  Location: [facility] ;  Service: Obstetrics    ID LIGATION,FALLOPIAN TUBE W/ Bilateral 9/3/2019    Procedure: LIGATION/COAGULATION TUBAL;  Surgeon: Zay Boateng DO;  Location: [facility] ;  Service: Obstetrics    TONSILLECTOMY         Family History   Problem Relation Age of Onset    No Known Problems Mother     Heart disease Father     Heart attack Father     Stroke Father     No Known Problems Daughter     No Known Problems Son     Diverticulitis Maternal Grandmother     Diabetes Maternal Grandfather     Aneurysm Maternal Grandfather     No Known Problems Paternal Grandmother     Heart disease Paternal Grandfather     Heart attack Paternal Grandfather     Heart disease Family     Crohn's disease Paternal Aunt      I have reviewed and agree with the history as documented  Social History     Tobacco Use    Smoking status: Never Smoker    Smokeless tobacco: Never Used   Substance Use Topics    Alcohol use: No    Drug use: Not Currently     Types: Marijuana     Comment: last use approx 12/2018        Review of Systems   Cardiovascular: Positive for leg swelling  Gastrointestinal: Positive for abdominal pain  Neurological: Positive for headaches  Physical Exam  Physical Exam   Constitutional: She is oriented to person, place, and time  She appears well-developed and well-nourished  No distress  Pt appears generally uncomfortable,    HENT:   Head: Normocephalic and atraumatic  Mouth/Throat: Oropharynx is clear and moist    Eyes: Pupils are equal, round, and reactive to light  EOM are normal    Neck: No JVD present  No tracheal deviation present  Cardiovascular: Normal rate, regular rhythm, normal heart sounds and intact distal pulses  Exam reveals no gallop and no friction rub  No murmur heard  Pulmonary/Chest: Effort normal and breath sounds normal  No respiratory distress  She has no wheezes  She has no rales  Abdominal: Soft  Bowel sounds are normal  She exhibits no distension  There is tenderness in the right upper quadrant  There is no rebound and no guarding  Neurological: She is alert and oriented to person, place, and time  No cranial nerve deficit  She exhibits normal muscle tone  Skin: Skin is warm and dry  She is not diaphoretic  No pallor  B/L pretibial edema    Psychiatric: She has a normal mood and affect   Her behavior is normal    Nursing note and vitals reviewed        Vital Signs  ED Triage Vitals   Temperature Pulse Respirations Blood Pressure SpO2   09/09/19 2000 09/09/19 2000 09/09/19 2000 09/09/19 2000 09/09/19 2000   98 2 °F (36 8 °C) 77 18 (!) 182/103 100 %      Temp Source Heart Rate Source Patient Position - Orthostatic VS BP Location FiO2 (%)   09/09/19 2000 09/09/19 2000 09/09/19 2000 09/09/19 2000 --   Oral Monitor Sitting Left arm       Pain Score       09/10/19 0155       7           Vitals:    09/10/19 1400 09/10/19 1500 09/10/19 1600 09/10/19 1700   BP: 126/84 135/88 122/70 134/85   Pulse: 101 105 99 100   Patient Position - Orthostatic VS:             Visual Acuity  Visual Acuity      Most Recent Value   L Pupil Size (mm)  3   R Pupil Size (mm)  3   L Pupil Shape  Round   R Pupil Shape  Round          ED Medications  Medications   magnesium sulfate 2 g/50 mL IVPB (premix) 2 g (0 g Intravenous Stopped 9/9/19 2047)   Labetalol HCl (NORMODYNE) injection 10 mg (10 mg Intravenous Given 9/9/19 2028)       Diagnostic Studies  Results Reviewed     Procedure Component Value Units Date/Time    Magnesium [[id number]]  (Abnormal) Collected:  09/10/19 1014    Lab Status:  Final result Specimen:  Blood from Arm, Left Updated:  09/10/19 1035     Magnesium 4 8 mg/dL     CBC and differential [[id number]]  (Abnormal) Collected:  09/10/19 0539    Lab Status:  Final result Specimen:  Blood from Arm, Left Updated:  09/10/19 0617     WBC 9 78 Thousand/uL      RBC 3 40 Million/uL      Hemoglobin 9 5 g/dL      Hematocrit 30 0 %      MCV 88 fL      MCH 27 9 pg      MCHC 31 7 g/dL      RDW 14 2 %      MPV 10 3 fL      Platelets 476 Thousands/uL      nRBC 0 /100 WBCs     Magnesium [[id number]]  (Abnormal) Collected:  09/10/19 0539    Lab Status:  Final result Specimen:  Blood from Arm, Left Updated:  09/10/19 0611     Magnesium 5 5 mg/dL     Comprehensive metabolic panel [[id number]]  (Abnormal) Collected:  09/10/19 0539    Lab Status:  Final result Specimen:  Blood from Arm, Left Updated:  09/10/19 0606     Sodium 138 mmol/L      Potassium 3 5 mmol/L      Chloride 101 mmol/L      CO2 26 mmol/L      ANION GAP 11 mmol/L      BUN 7 mg/dL      Creatinine 0 70 mg/dL      Glucose 109 mg/dL      Calcium 8 5 mg/dL      AST 20 U/L      ALT 23 U/L      Alkaline Phosphatase 110 U/L      Total Protein 7 7 g/dL      Albumin 2 8 g/dL      Total Bilirubin 0 20 mg/dL      eGFR 117 ml/min/1 73sq m     Narrative:       [place] guidelines for Chronic Kidney Disease (CKD):     Stage 1 with normal or high GFR (GFR > 90 mL/min/1 73 square meters)    Stage 2 Mild CKD (GFR = 60-89 mL/min/1 73 square meters)    Stage 3A Moderate CKD (GFR = 45-59 mL/min/1 73 square meters)    Stage 3B Moderate CKD (GFR = 30-44 mL/min/1 73 square meters)    Stage 4 Severe CKD (GFR = 15-29 mL/min/1 73 square meters)    Stage 5 End Stage CKD (GFR <15 mL/min/1 73 square meters)  Note: GFR calculation is accurate only with a steady state creatinine    Protein / creatinine ratio, urine [[id number]]  (Abnormal) Collected:  09/09/19 2025    Lab Status:  Final result Specimen:  Urine, Clean Catch Updated:  09/10/19 0109     Creatinine, Ur 27 7 mg/dL      Protein Urine Random 10 mg/dL      Prot/Creat Ratio, Ur 0 36    Phosphorus [[id number]]  (Normal) Collected:  09/09/19 2022    Lab Status:  Final result Specimen:  Blood from Arm, Right Updated:  09/10/19 0012     Phosphorus 4 1 mg/dL     Magnesium [[id number]]  (Abnormal) Collected:  09/09/19 2342    Lab Status:  Final result Specimen:  Blood from Arm, Left Updated:  09/09/19 2359     Magnesium 3 8 mg/dL     Protime-INR [[id number]]  (Normal) Collected:  09/09/19 2342    Lab Status:  Final result Specimen:  Blood from Arm, Left Updated:  09/09/19 2358     Protime 12 2 seconds      INR 0 91    APTT [[id number]]  (Normal) Collected:  09/09/19 2342    Lab Status:  Final result Specimen:  Blood from Arm, Left Updated:  09/09/19 2358     PTT 31 seconds Calcium, ionized [[id number]]  (Abnormal) Collected:  09/09/19 2342    Lab Status:  Final result Specimen:  Blood from Arm, Left Updated:  09/09/19 2349     Calcium, Ionized 1 09 mmol/L     Platelet count [[id number]]  (Normal) Collected:  09/09/19 2342    Lab Status:  Final result Specimen:  Blood from Arm, Left Updated:  09/09/19 2348     Platelets 899 Thousands/uL      MPV 10 5 fL     Fibrinogen [[id number]]  (Abnormal) Collected:  09/09/19 2101    Lab Status:  Final result Specimen:  Blood from Arm, Right Updated:  09/09/19 2346     Fibrinogen 616 mg/dL     Protime-INR [[id number]]  (Normal) Collected:  09/09/19 2101    Lab Status:  Final result Specimen:  Blood from Arm, Right Updated:  09/09/19 2339     Protime 11 8 seconds      INR 0 90    APTT [[id number]]  (Normal) Collected:  09/09/19 2101    Lab Status:  Final result Specimen:  Blood from Arm, Right Updated:  09/09/19 2339     PTT 31 seconds     Comprehensive metabolic panel [[id number]]  (Abnormal) Collected:  09/09/19 2022    Lab Status:  Final result Specimen:  Blood from Arm, Right Updated:  09/09/19 2122     Sodium 139 mmol/L      Potassium 3 3 mmol/L      Chloride 101 mmol/L      CO2 28 mmol/L      ANION GAP 10 mmol/L      BUN 7 mg/dL      Creatinine 0 73 mg/dL      Glucose 101 mg/dL      Calcium 9 1 mg/dL      AST 19 U/L      ALT 27 U/L      Alkaline Phosphatase 103 U/L      Total Protein 7 2 g/dL      Albumin 2 6 g/dL      Total Bilirubin 0 20 mg/dL      eGFR 112 ml/min/1 73sq m     Narrative:       [place] guidelines for Chronic Kidney Disease (CKD):     Stage 1 with normal or high GFR (GFR > 90 mL/min/1 73 square meters)    Stage 2 Mild CKD (GFR = 60-89 mL/min/1 73 square meters)    Stage 3A Moderate CKD (GFR = 45-59 mL/min/1 73 square meters)    Stage 3B Moderate CKD (GFR = 30-44 mL/min/1 73 square meters)    Stage 4 Severe CKD (GFR = 15-29 mL/min/1 73 square meters)    Stage 5 End Stage CKD (GFR <15 mL/min/1 73 square meters)  Note: GFR calculation is accurate only with a steady state creatinine    CBC and differential [[id number]]  (Abnormal) Collected:  09/09/19 2022    Lab Status:  Final result Specimen:  Blood from Arm, Right Updated:  09/09/19 2109     WBC 10 89 Thousand/uL      RBC 3 12 Million/uL      Hemoglobin 8 7 g/dL      Hematocrit 27 4 %      MCV 88 fL      MCH 27 9 pg      MCHC 31 8 g/dL      RDW 14 0 %      MPV 10 2 fL      Platelets 561 Thousands/uL      nRBC 0 /100 WBCs     Urine Microscopic [[id number]]  (Abnormal) Collected:  09/09/19 2023    Lab Status:  Final result Specimen:  Urine, Clean Catch Updated:  09/09/19 2044     RBC, UA 4-10 /hpf      WBC, UA 1-2 /hpf      Epithelial Cells Occasional /hpf      Bacteria, UA Occasional /hpf     UA w Reflex to Microscopic w Reflex to Culture [[id number]]  (Abnormal) Collected:  09/09/19 2023    Lab Status:  Final result Specimen:  Urine, Clean Catch Updated:  09/09/19 2036     Color, UA Light Yellow     Clarity, UA Clear     Specific Gravity, UA 1 010     pH, UA 7 5     Leukocytes, UA Small     Nitrite, UA Negative     Protein, UA Negative mg/dl      Glucose, UA Negative mg/dl      Ketones, UA Negative mg/dl      Urobilinogen, UA 0 2 E U /dl      Bilirubin, UA Negative     Blood, UA Large                 No orders to display              Procedures  Procedures       ED Course                               MDM  Number of Diagnoses or Management Options  Pre-eclampsia:   Diagnosis management comments: 34year old female with likely post partum pre-e, will treat with magnesium, antihypertensives and d/w OB    Discussed case with on call OB who recommended transfer down to [place], however there are no beds available  Given that the patient has already delivered, medical management is the requirement at this point and so the pt is able to be cared for here at [address] with OB Cslt  Spoke with [facility] attending who has accepted patient   Pt understands and is aware of the care plan  Disposition  Final diagnoses:   Pre-eclampsia     Time reflects when diagnosis was documented in both MDM as applicable and the Disposition within this note     Time User Action Codes Description Comment    9/9/2019  9:45 PM Janine Peterson Add [O14 90] Pre-eclampsia     9/10/2019  5:28 PM Keith Serna Add [O14 95] Preeclampsia in postpartum period       ED Disposition     ED Disposition Condition Date/Time Comment    Admit Stable Mon Sep 9, 2019  9:46 PM Case was discussed with CCM and the patient's admission status was agreed to be Admission Status: inpatient status to the service of Dr Justin Gallegos   Follow-up Information     Follow up With Specialties Details Why Contact Info    Prabhu Echavarria MD Obstetrics and Gynecology, Obstetrics, Gynecology Follow up in 4 day(s) Please call the office and ask for [facility]  She will help you schedule your appointment    [address] [facility]            Discharge Medication List as of 9/10/2019  5:47 PM      START taking these medications    Details   NIFEdipine ER (ADALAT CC) 30 MG 24 hr tablet Take 1 tablet (30 mg total) by mouth daily, Starting Tue 9/10/2019, Normal         CONTINUE these medications which have NOT CHANGED    Details   albuterol (2 5 mg/3 mL) 0 083 % nebulizer solution Take 3 mL by nebulization every 6 (six) hours as needed for wheezing, Starting Tue 1/9/2018, Print      albuterol (PROVENTIL HFA,VENTOLIN HFA) 90 mcg/act inhaler Inhale 2 puffs every 4 (four) hours as needed for wheezing, Starting Tue 1/9/2018, Print      ferrous sulfate 324 (65 Fe) mg Take 1 tablet (324 mg total) by mouth 2 (two) times a day before meals, Starting Mon 7/8/2019, Normal      ibuprofen (MOTRIN) 600 mg tablet Take 1 tablet (600 mg total) by mouth every 6 (six) hours as needed for mild pain, moderate pain, fever or headaches (cramping), Starting Fri 9/6/2019, Normal      oxyCODONE (ROXICODONE) 5 mg immediate release tablet Take 1-2 tablets (5-10 mg total) by mouth every 4 (four) hours as needed for severe pain for up to 10 daysMax Daily Amount: 30 mg, Starting Fri 9/6/2019, Until Mon 9/16/2019, Normal      acetaminophen (TYLENOL) 325 mg tablet Take 2 tablets (650 mg total) by mouth every 4 (four) hours as needed for mild pain, moderate pain, severe pain, headaches or fever, Starting Fri 9/6/2019, Normal      docusate sodium (COLACE) 100 mg capsule Take 1 capsule (100 mg total) by mouth 2 (two) times a day as needed for constipation, Starting Mon 3/18/2019, Normal      montelukast (SINGULAIR) 10 mg tablet Take 1 tablet (10 mg total) by mouth daily at bedtime, Starting Sun 9/16/2018, Print      Prenatal Vit-Fe Fumarate-FA (PRENATAL VITAMIN PO) Take by mouth, Historical Med      simethicone (MYLICON) 80 mg chewable tablet Chew 1 tablet (80 mg total) every 6 (six) hours as needed for flatulence, Starting Fri 9/6/2019, No Print           Outpatient Discharge Orders   Activity as tolerated     Call provider for:  persistent nausea or vomiting     Call provider for:  severe uncontrolled pain     Call provider for:  redness, tenderness, or signs of infection (pain, swelling, redness, odor or green/yellow discharge around incision site)     Call provider for: active or persistent bleeding     Call provider for:  difficulty breathing, headache or visual disturbances     Call provider for:  hives     Call provider for:  persistent dizziness or light-headedness     Call provider for:  extreme fatigue     Call provider for:     Lifting restrictions     No strenuous exercise     Shower Daily       ED Provider  Electronically Signed by           Leonel Odonnell MD  09/18/19 [phone]

## 2019-10-01 ENCOUNTER — TELEPHONE (OUTPATIENT)
Dept: OBGYN CLINIC | Facility: CLINIC | Age: 29
End: 2019-10-01

## 2019-10-01 ENCOUNTER — POSTPARTUM VISIT (OUTPATIENT)
Dept: OBGYN CLINIC | Facility: MEDICAL CENTER | Age: 29
End: 2019-10-01
Payer: COMMERCIAL

## 2019-10-01 VITALS
BODY MASS INDEX: 32.19 KG/M2 | WEIGHT: 176 LBS | SYSTOLIC BLOOD PRESSURE: 126 MMHG | DIASTOLIC BLOOD PRESSURE: 74 MMHG | TEMPERATURE: 97.8 F

## 2019-10-01 DIAGNOSIS — L03.311 CELLULITIS OF ABDOMINAL WALL: ICD-10-CM

## 2019-10-01 PROCEDURE — 99213 OFFICE O/P EST LOW 20 MIN: CPT | Performed by: PHYSICIAN ASSISTANT

## 2019-10-01 RX ORDER — CLINDAMYCIN HYDROCHLORIDE 300 MG/1
300 CAPSULE ORAL 3 TIMES DAILY
Qty: 21 CAPSULE | Refills: 0 | Status: SHIPPED | OUTPATIENT
Start: 2019-10-01 | End: 2019-10-08

## 2019-10-01 RX ORDER — SERTRALINE HYDROCHLORIDE 25 MG/1
25 TABLET, FILM COATED ORAL DAILY
Qty: 30 TABLET | Refills: 2 | Status: SHIPPED | OUTPATIENT
Start: 2019-10-01 | End: 2020-01-27

## 2019-10-01 RX ORDER — LORAZEPAM 0.5 MG/1
0.5 TABLET ORAL EVERY 8 HOURS PRN
Qty: 30 TABLET | Refills: 0 | Status: SHIPPED | OUTPATIENT
Start: 2019-10-01 | End: 2019-10-09 | Stop reason: ALTCHOICE

## 2019-10-01 NOTE — ASSESSMENT & PLAN NOTE
Small area of erythema noted at edge of incision, TTP  No open areas/drainage  Afebrile - 97 8 in office  Short course of ABX rx'd - to keep area clean/dry as able, NSAIDs as she has been  To notify w/ worsening symptoms

## 2019-10-01 NOTE — ASSESSMENT & PLAN NOTE
Pt PPD score 21  Denies any HI/SI  Discussed symptoms at length and risks of untreated PPD/anxiety  Pt agreeable to medication start - Zoloft rx'd - reviewed common SE, risks vs  Benefits of SSRI use   Aware not immediately effective, can expect to notice improvement in next few weeks  Given Ativan to use PRN for anxiety, discussed proper use, SE, risks v  Benefits or use  Not currently breastfeeding  Declines therapy referral at this time    Aware to notify should symptoms worsen - f/u w/ me in 1 wk

## 2019-10-01 NOTE — PROGRESS NOTES
Assessment/Plan:    Postpartum depression  Pt PPD score 21  Denies any HI/SI  Discussed symptoms at length and risks of untreated PPD/anxiety  Pt agreeable to medication start - Zoloft rx'd - reviewed common SE, risks vs  Benefits of SSRI use  Aware not immediately effective, can expect to notice improvement in next few weeks  Given Ativan to use PRN for anxiety, discussed proper use, SE, risks v  Benefits or use  Not currently breastfeeding  Declines therapy referral at this time    Aware to notify should symptoms worsen - f/u w/ me in 1 wk     Routine postpartum follow-up  Normal postpartum exam  The patient may advance activity as tolerated and may resume sexual activity  BTL for contra  She will RTO for routine annual gyn exam in 3-6 mos  The patient agrees with plan  Cellulitis of abdominal wall  Small area of erythema noted at edge of incision, TTP  No open areas/drainage  Afebrile - 97 8 in office  Short course of ABX rx'd - to keep area clean/dry as able, NSAIDs as she has been  To notify w/ worsening symptoms        Preeclampsia in postpartum period  BP WNL  Asymptomatic  Cont Nifedipine at current dose         Diagnoses and all orders for this visit:    Postpartum depression  -     sertraline (ZOLOFT) 25 mg tablet; Take 1 tablet (25 mg total) by mouth daily  -     LORazepam (ATIVAN) 0 5 mg tablet; Take 1 tablet (0 5 mg total) by mouth every 8 (eight) hours as needed for anxiety    Cellulitis of abdominal wall  -     clindamycin (CLEOCIN) 300 MG capsule; Take 1 capsule (300 mg total) by mouth 3 (three) times a day for 7 days    Routine postpartum follow-up        Subjective:      Patient ID: Brittny Tang is a 34 y o  female  Pt presents for routine PP exam  S/p rLTCS w/ BTL at 38w1d following admission for newly dx gHTN  Was readmitted following d/c for PP preE - started on Procardia   Bps have been WNL since that time - has remained asymptomatic  Baby [facility], doing well  bottle feeding  Lochia: minimal  Bowel/bladder function: normal  Pain: notes pain at R side of incision, redness; has been worsening over past several days  No open areas/oozing  Has not taken temp at home but denies fevers/chills  Alternating motrin w/ Tylenol for pain control  PPD score: 21 - very tearful at OV today  Feeling very overwhelmed at home  Feeling like 'little things' are making her cry, making her worry  Feels like she's 'freaking out' for no reason, getting upset at things that wouldn't normally make her upset  Doesn't feel like herself  Has good family/social support  Denies any HI/SI  Is feeling bond w/ baby, is happy being a mom (has an 7 y/o and 3 y/o as well); frustrated as didn't feel this way following delivery of other 2 children  BTL for contra            The following portions of the patient's history were reviewed and updated as appropriate: allergies, current medications, past family history, past medical history, past social history, past surgical history and problem list     Review of Systems   Constitutional: Negative for appetite change, fatigue and unexpected weight change  Respiratory: Negative for chest tightness and shortness of breath  Cardiovascular: Negative for chest pain, palpitations and leg swelling  Gastrointestinal: Negative for abdominal pain, constipation, diarrhea, nausea and vomiting  Genitourinary: Negative for difficulty urinating, dyspareunia, menstrual problem, pelvic pain and vaginal discharge  Musculoskeletal: Negative for arthralgias and myalgias  Neurological: Negative for dizziness, light-headedness and headaches  Psychiatric/Behavioral: Negative for dysphoric mood  The patient is not nervous/anxious  All other systems reviewed and are negative  Objective:      /74   Temp 97 8 °F (36 6 °C)   Wt 79 8 kg (176 lb)   BMI 32 19 kg/m²          Physical Exam   Constitutional: She is oriented to person, place, and time   She appears well-developed and well-nourished  HENT:   Head: Normocephalic and atraumatic  Abdominal: Soft  There is no tenderness  Hernia confirmed negative in the right inguinal area and confirmed negative in the left inguinal area  Genitourinary: Vagina normal and uterus normal  Pelvic exam was performed with patient supine  There is no rash, tenderness, lesion or injury on the right labia  There is no rash, tenderness, lesion or injury on the left labia  Cervix exhibits no motion tenderness, no discharge and no friability  Right adnexum displays no mass, no tenderness and no fullness  Left adnexum displays no mass, no tenderness and no fullness  No erythema, tenderness or bleeding in the vagina  No signs of injury around the vagina  No vaginal discharge found  Neurological: She is alert and oriented to person, place, and time  Skin: Skin is warm and dry  Psychiatric: She has a normal mood and affect  Nursing note and vitals reviewed

## 2019-10-01 NOTE — TELEPHONE ENCOUNTER
Spoke with Pt today via phone call  Pt states she made an appointment today at 1:20 pm with FELICITY Sanchez to have incision site checked

## 2019-10-01 NOTE — TELEPHONE ENCOUNTER
Spoke with pt - delivered 09/03: states on the right side it is red, warm to the touch  Tender to the touch - painful - has to take Motrin to relieve the pain  Yellow leakage - states also has an odor but can't describe it  States she really wants the appt  Didn't want to go into more detail over the phone

## 2019-10-01 NOTE — TELEPHONE ENCOUNTER
----- Message from Lenny Thomas PA-C sent at 10/1/2019  9:35 AM EDT -----  I saw this pt was just added on by Charles - can we triage this to get a little more info and see if appropriate for APC to see? Thank you!!  Ps hope you had a great trip!

## 2019-10-01 NOTE — ASSESSMENT & PLAN NOTE
Normal postpartum exam  The patient may advance activity as tolerated and may resume sexual activity  BTL for contra  She will RTO for routine annual gyn exam in 3-6 mos  The patient agrees with plan

## 2019-10-04 ENCOUNTER — TELEPHONE (OUTPATIENT)
Dept: OBGYN CLINIC | Facility: CLINIC | Age: 29
End: 2019-10-04

## 2019-10-04 ENCOUNTER — TELEPHONE (OUTPATIENT)
Dept: POSTPARTUM | Facility: CLINIC | Age: 29
End: 2019-10-04

## 2019-10-04 NOTE — TELEPHONE ENCOUNTER
Pt advised this will take a little while to really begin to work, but she said she cannot handle anything right now , she feels angry at herself, upset and crying, gave her [facility] and [facility] name but she refused said she has no time to see these people, not sure what else to do I told her you will give her a call

## 2019-10-04 NOTE — TELEPHONE ENCOUNTER
Patient called stated the Zoloft and Ativan are not working  She has been taking them since Tuesday and she is still feeling down/ angry   Please advise

## 2019-10-04 NOTE — TELEPHONE ENCOUNTER
Per OB [facility]) request / referral - schedule patient with Jose Juan Rondon  Left message for Chelsea to call [facility] back to schedule

## 2019-10-04 NOTE — TELEPHONE ENCOUNTER
As discussed w/ her, will not work overnight and will take some time   Can take 2 of the Ativan if she feels this would help w/ acute anxiety  Can refer to psych for further management but again, likely won't have appt available right away; also agree w/ referral to [facility] but pt is declining    If she feels mood worsening or any threat to herself/others  - needs to seek eval in ED

## 2019-10-04 NOTE — TELEPHONE ENCOUNTER
Pt did not call back   - called pt again  Informed of EM recommendation to try 2 ativan, as this may help with her acute anxiety  , also informed that zoloft will take a little while to begin working  Informed pt of  PP depression support group on Thursday [facility] at [facility] (Count[facility] include [facility] beds at the [facility])  Also, pt was given name of 3 practices where [facility] counsels pts  ( pt states none are close enough to her ) referral was entered in [employer] for ppsych    With [facility] Qu[facility]   Also,  Given pt names of 2 offices in [place] [id number] psych, assoc  Advised pt to call with any further questions/concerns/help  - ER if depression worsens

## 2019-10-04 NOTE — TELEPHONE ENCOUNTER
Referral for psychiatric counseling, [facility]  Entered in epic  Called pt- pt states she will call me back in 40 min, when she arrives home  My direct ext given to pt

## 2019-10-08 ENCOUNTER — TELEPHONE (OUTPATIENT)
Dept: OBGYN CLINIC | Facility: CLINIC | Age: 29
End: 2019-10-08

## 2019-10-08 NOTE — TELEPHONE ENCOUNTER
Placed call to patient to follow up regarding postpartum depression  Patient did not follow up with [facility] [address] because she said it is too far for her  Advised I can provide phone numbers for offices in [place]  Patient states she is feeling much better after starting the medication  Denies HI/SI  Has pp exam with EM 10/9  States will discuss with her at this time if she feels she needs further counseling

## 2019-10-09 ENCOUNTER — POSTPARTUM VISIT (OUTPATIENT)
Dept: OBGYN CLINIC | Facility: MEDICAL CENTER | Age: 29
End: 2019-10-09
Payer: COMMERCIAL

## 2019-10-09 VITALS — WEIGHT: 190.2 LBS | BODY MASS INDEX: 34.79 KG/M2 | DIASTOLIC BLOOD PRESSURE: 80 MMHG | SYSTOLIC BLOOD PRESSURE: 118 MMHG

## 2019-10-09 PROCEDURE — 99213 OFFICE O/P EST LOW 20 MIN: CPT | Performed by: PHYSICIAN ASSISTANT

## 2019-10-09 RX ORDER — ALPRAZOLAM 0.25 MG/1
0.25 TABLET ORAL
Qty: 30 TABLET | Refills: 0 | Status: SHIPPED | OUTPATIENT
Start: 2019-10-09 | End: 2019-12-11

## 2019-10-09 NOTE — PROGRESS NOTES
Assessment/Plan:    Postpartum depression  States PP depression/anxiety symptoms slightly improved from last meeting  Will increase Zoloft to 50mg, requests trial of Xanax as has taken in past and felt was effective  Psych referral placed, aware to call and schedule  Open to counseling - interested in seeing if they'd be able to reach out via phone as transportation is difficult for her right now - will contact to see if this is possibility    Denies HI/SI - to notify w/ worsening symptoms  Declines f/u at this time - will reach out to her in 1 wk to see how she's feeling         Diagnoses and all orders for this visit:    Postpartum depression  -     Ambulatory referral to [facility]; Future  -     ALPRAZolam (XANAX) 0 25 mg tablet; Take 1 tablet (0 25 mg total) by mouth daily at bedtime as needed for anxiety        Subjective:      Patient ID: Mirella Akbar is a 34 y o  female  Pt presents for f/u PP depression  Was started on Zoloft 25mg last week w/ PRN Ativan for anxiety  Declined counseling but called several days after visit and was agreeable for psych referral  Has not scheduled    States she feels mood has slightly improved since last week and start of medication -as discussed at last visit - is aware Zoloft not immediately effective  Still feeling overwhelmed, feels anxiety not manageable   Took 2 Ativan as advised for acute anxiety but felt did not lead to improvement in symptoms  Requests change to Xanax as has used in past and felt had more impact on her anxiety    PPD score still 19 today  Denies any HI/SI  Daughter doing well, is enjoying being her mom , just feeling overwhelmed w/ additional responsibilities at home w/ other children  Does have good support system from friends/family/partner    Not breastfeeding            The following portions of the patient's history were reviewed and updated as appropriate: allergies, current medications, past family history, past medical history, past social history, past surgical history and problem list     Review of Systems   Constitutional: Negative for appetite change, fatigue and unexpected weight change  Respiratory: Negative for chest tightness and shortness of breath  Cardiovascular: Negative for chest pain, palpitations and leg swelling  Gastrointestinal: Negative for abdominal pain, constipation, diarrhea, nausea and vomiting  Genitourinary: Negative for difficulty urinating, dyspareunia, menstrual problem, pelvic pain and vaginal discharge  Musculoskeletal: Negative for arthralgias and myalgias  Neurological: Negative for dizziness, light-headedness and headaches  Psychiatric/Behavioral: Negative for dysphoric mood  The patient is nervous/anxious  All other systems reviewed and are negative  Objective:      /80 (BP Location: Right arm, Patient Position: Sitting, Cuff Size: Standard)   Wt 86 3 kg (190 lb 3 2 oz)   BMI 34 79 kg/m²          Physical Exam   Constitutional: She is oriented to person, place, and time  She appears well-developed and well-nourished  HENT:   Head: Normocephalic and atraumatic  Neurological: She is alert and oriented to person, place, and time  Skin: Skin is warm and dry  Psychiatric: She has a normal mood and affect  Nursing note and vitals reviewed

## 2019-10-09 NOTE — ASSESSMENT & PLAN NOTE
States PP depression/anxiety symptoms slightly improved from last meeting  Will increase Zoloft to 50mg, requests trial of Xanax as has taken in past and felt was effective     Psych referral placed  Open to counseling - interested in seeing if they'd be able to reach out via phone as transportation is difficult for her right now - will contact to see if this is possibility    Denies HI/SI - to notify w/ worsening symptoms  Declines f/u at this time - will reach out to her in 1 wk to see how she's feeling

## 2019-10-11 ENCOUNTER — TELEPHONE (OUTPATIENT)
Dept: OBGYN CLINIC | Facility: CLINIC | Age: 29
End: 2019-10-11

## 2019-10-11 NOTE — TELEPHONE ENCOUNTER
"Jesus Chacon" from [facility] pharmacy called office today  He states Pt's insurance is no longer paying for Pt's psychotropic medication  He further states he needs a pre-authorization for Pt's medication  He can be reached @ [phone]

## 2019-10-11 NOTE — TELEPHONE ENCOUNTER
Pt called to check status, states she is very anxious about not getting the medication  She doesn't understand why medication is not covered, how long will it take for prior auth to get approved or denied  I told her we are doing all we can and have to wait to hear from insurance

## 2019-10-29 ENCOUNTER — TELEPHONE (OUTPATIENT)
Dept: OBGYN CLINIC | Facility: CLINIC | Age: 29
End: 2019-10-29

## 2019-10-29 NOTE — TELEPHONE ENCOUNTER
Patient returned call - she stated she took it upon herself and found someone to talk to   Does not require counseling through us

## 2019-10-29 NOTE — TELEPHONE ENCOUNTER
----- Message from Helga Grant PA-C sent at 10/25/2019  8:44 AM EDT -----   Can we give this pt a call and see if she's still interested in counseling? See note below - she hasn't returned their calls despite them reaching out to her  Thanks!    ----- Message -----  From: Amy Campos  Sent: 10/25/2019   8:34 AM EDT  To: Helga Grant PA-C    Just an FYI    She has not returned our calls to schedule and appt for counseling  She is always welcome to as desired  Have a great weekend  Cresenciano Lusty  ----- Message -----  From: Helga Grant PA-C  Sent: 10/9/2019  10:28 AM EDT  To: Amy Campos    Good morning! I saw this pt today for f/u PP depression - she agrees she'd benefit from counseling but unfortunately does not have the transportation available to her to get to appts  I put a referral in for you - she was wondering if you could reach out via phone? I told her I'd certainly ask  I have her on 50mg Zoloft and Xanax   25 PRN (she's not breastfeeding) - let me know if you need any additional info    Thank you!!   Helga Grant

## 2019-11-02 NOTE — LETTER
February 13, 2019     Patient: Raymnudo Louise   YOB: 1990   Date of Visit: 2/13/2019       To Whom it May Concern:    Raymundo Louise is under my professional care  She was seen in my office on 2/13/2019  She may return to work on 02/14/2019  If you have any questions or concerns, please don't hesitate to call           Sincerely,          Sonam Mcgovern RN        CC: No Recipients
no vascular compromise

## 2019-11-18 ENCOUNTER — TELEPHONE (OUTPATIENT)
Dept: POSTPARTUM | Facility: CLINIC | Age: 29
End: 2019-11-18

## 2019-11-18 ENCOUNTER — OFFICE VISIT (OUTPATIENT)
Dept: OBGYN CLINIC | Facility: MEDICAL CENTER | Age: 29
End: 2019-11-18
Payer: COMMERCIAL

## 2019-11-18 VITALS — WEIGHT: 203 LBS | SYSTOLIC BLOOD PRESSURE: 130 MMHG | DIASTOLIC BLOOD PRESSURE: 80 MMHG | BODY MASS INDEX: 37.13 KG/M2

## 2019-11-18 DIAGNOSIS — F41.9 ANXIETY: Primary | ICD-10-CM

## 2019-11-18 PROCEDURE — 99214 OFFICE O/P EST MOD 30 MIN: CPT | Performed by: NURSE PRACTITIONER

## 2019-11-18 RX ORDER — VITAMIN B COMPLEX
1 CAPSULE ORAL DAILY
COMMUNITY

## 2019-11-18 RX ORDER — BUSPIRONE HYDROCHLORIDE 7.5 MG/1
7.5 TABLET ORAL 2 TIMES DAILY
Qty: 60 TABLET | Refills: 0 | Status: SHIPPED | OUTPATIENT
Start: 2019-11-18 | End: 2019-12-10 | Stop reason: SDUPTHER

## 2019-11-18 NOTE — ASSESSMENT & PLAN NOTE
Assessment/Plan:     Postpartum depression/Anxiety   States that when she attempted to increase the dose of Zoloft th 50 mg she became more anxious  Requesting refill of Xanax  Discussed with Dr Juanita Gaxiola  She would rather the patient switch to Buspar rather than continue on Xanax  Patient is agreeable to same  Declines to see our Psych department  She states she sees a therapist weekly and does not want to start over again with another provider  Advised that we would still like her to see   Called [facility] and they will follow up with her this week to check in        Denies HI/SI - to notify w/ worsening symptoms  Declines f/u at this time - will reach out to her in 1 wk to see how she's feeling

## 2019-11-18 NOTE — PROGRESS NOTES
Assessment/Plan:    Postpartum depression  Assessment/Plan:     Postpartum depression/Anxiety   States that when she attempted to increase the dose of Zoloft th 50 mg she became more anxious  Requesting refill of Xanax  Discussed with Dr Vitaly Ashby  She would rather the patient switch to Buspar rather than continue on Xanax  Patient is agreeable to same  Declines to see our Psych department  She states she sees a therapist weekly and does not want to start over again with another provider  Advised that we would still like her to see  Called [facility] and they will follow up with her this week to check in        Denies HI/SI - to notify w/ worsening symptoms  Declines f/u at this time - will reach out to her in 1 wk to see how she's feeling                            Diagnoses and all orders for this visit:    Anxiety  -     busPIRone (BUSPAR) 7 5 mg tablet; Take 1 tablet (7 5 mg total) by mouth 2 (two) times a day    Postpartum depression  -     busPIRone (BUSPAR) 7 5 mg tablet; Take 1 tablet (7 5 mg total) by mouth 2 (two) times a day    Other orders  -     b complex vitamins capsule; Take 1 capsule by mouth daily          Subjective:      Patient ID: Renee Mathew is a 34 y o  female  Pt presents for F/U of PP depression/anxiety  Has been taking Zoloft 25-50 mg/day with Xanax daily to manage her anxiety  Requesting refill of Xanax  States she sees a therapist weekly  Not interested in seeing psych through [facility]  States that she thinks Zoloft is helping somewhat but still feels anxious daily  Reviewed with Dr Vitaly Ashby  Prefers to try a different medication rather than refill the Xanax  Will try buspar and wean off Zoloft  Will take 25 mg q other day for one week then one every 3rd day for one week then stop Zoloft  Will start Buspar today  PPD score today 20        HPI    The following portions of the patient's history were reviewed and updated as appropriate: allergies, current medications, past family history, past medical history, past social history, past surgical history and problem list     Review of Systems   Constitutional: Negative for appetite change, diaphoresis, fatigue and unexpected weight change  Positive for clammy skin when she has anxiety    Respiratory: Negative for chest tightness and shortness of breath  Cardiovascular: Negative for chest pain and palpitations  Gastrointestinal: Negative for abdominal pain and nausea  Genitourinary: Negative for menstrual problem and pelvic pain  Neurological: Negative for dizziness, light-headedness and headaches  Psychiatric/Behavioral: Negative for agitation, self-injury and suicidal ideas  The patient is not hyperactive          + for anxiety and depression   All other systems reviewed and are negative  Objective:      /80 (BP Location: Left arm, Patient Position: Sitting, Cuff Size: Large)   Wt 92 1 kg (203 lb)   LMP 11/04/2019   Breastfeeding? No   BMI 37 13 kg/m²          Physical Exam   Constitutional: She is oriented to person, place, and time  She appears well-developed  Cardiovascular: Normal rate and regular rhythm  Pulmonary/Chest: Breath sounds normal    Neurological: She is oriented to person, place, and time  Psychiatric: She has a normal mood and affect

## 2019-11-18 NOTE — TELEPHONE ENCOUNTER
Pt was seen today by NP raul liriano ([facility]) for ppd  The nurse practitioner called [facility] so we can f/u with her sometime this week   Pt does see a therapist weekly and does not want to switch at this time please advise

## 2019-11-26 ENCOUNTER — TELEPHONE (OUTPATIENT)
Dept: OBGYN CLINIC | Facility: CLINIC | Age: 29
End: 2019-11-26

## 2019-11-26 NOTE — TELEPHONE ENCOUNTER
Patient is scheduled to be seen tomorrow with LATISHA in [facility]

## 2019-11-26 NOTE — TELEPHONE ENCOUNTER
Patient called stated the Buspar she has been prescribed is not working  Patient stated she is still having anxiety  Patient said Zoloft is not allowing her to function  Patient would like advise on what to do   Please advise

## 2019-11-27 ENCOUNTER — APPOINTMENT (OUTPATIENT)
Dept: LAB | Facility: MEDICAL CENTER | Age: 29
End: 2019-11-27
Payer: COMMERCIAL

## 2019-11-27 ENCOUNTER — POSTPARTUM VISIT (OUTPATIENT)
Dept: OBGYN CLINIC | Facility: MEDICAL CENTER | Age: 29
End: 2019-11-27
Payer: COMMERCIAL

## 2019-11-27 VITALS — BODY MASS INDEX: 37.68 KG/M2 | WEIGHT: 206 LBS | DIASTOLIC BLOOD PRESSURE: 70 MMHG | SYSTOLIC BLOOD PRESSURE: 120 MMHG

## 2019-11-27 DIAGNOSIS — F32.A ANXIETY AND DEPRESSION: Primary | ICD-10-CM

## 2019-11-27 DIAGNOSIS — F32.A ANXIETY AND DEPRESSION: ICD-10-CM

## 2019-11-27 DIAGNOSIS — F41.9 ANXIETY AND DEPRESSION: Primary | ICD-10-CM

## 2019-11-27 DIAGNOSIS — F41.9 ANXIETY AND DEPRESSION: ICD-10-CM

## 2019-11-27 LAB — TSH SERPL DL<=0.05 MIU/L-ACNC: 0.98 UIU/ML (ref 0.36–3.74)

## 2019-11-27 PROCEDURE — 99214 OFFICE O/P EST MOD 30 MIN: CPT | Performed by: OBSTETRICS & GYNECOLOGY

## 2019-11-27 PROCEDURE — 36415 COLL VENOUS BLD VENIPUNCTURE: CPT

## 2019-11-27 PROCEDURE — 84443 ASSAY THYROID STIM HORMONE: CPT

## 2019-11-27 NOTE — PROGRESS NOTES
Assessment/Plan:      Diagnoses and all orders for this visit:    Anxiety and depression  -     TSH, 3rd generation with Free T4 reflex; Future        Increase BuSpar to 7 5 mg 3 tablets daily  Return to office in 1-2 weeks  Subjective:     Patient ID: Urbano France is a 34 y o  female  Patient is a 66-year-old female, para 3, here for postpartum anxiety and depression  Patient is also experiencing housing difficulties as she is living with her partner's relatives and in a custody matos with her 6year old son  Patient also was in the ICU after delivery with postpartum preeclampsia  Patient was recently started on BuSpar 7 5 BID  Review of systems is negative for suicidal or homicidal ideation  Patient finds herself not able to sleep at night and crying all day  Patient sees a counselor named Olivia Orr on a weekly basis in Dr Iban Michael office  Patient did not go to the [facility] [address]  Review of Systems   Psychiatric/Behavioral: Positive for dysphoric mood and sleep disturbance  Negative for hallucinations, self-injury and suicidal ideas  The patient is nervous/anxious  Objective:     Physical Exam   Constitutional: She appears well-developed  Patient tearful during visit  Pulmonary/Chest: [place] normal    Skin: Skin is warm and dry  Nursing note and vitals reviewed  30 minutes was spent with the patient greater than 50% of the time was spent counseling and coordinating care

## 2019-12-05 ENCOUNTER — TELEPHONE (OUTPATIENT)
Dept: OBGYN CLINIC | Facility: MEDICAL CENTER | Age: 29
End: 2019-12-05

## 2019-12-05 NOTE — TELEPHONE ENCOUNTER
Pt missed her pp depression f/u appt this morning with Dr Gomez Mccall  I left a message for her to call back to r/s

## 2019-12-05 NOTE — TELEPHONE ENCOUNTER
Patient called back regarding rescheduling her post partum follow-up  She is going to give us a call Monday when she knows her availability for next week

## 2019-12-09 ENCOUNTER — TELEPHONE (OUTPATIENT)
Dept: OBGYN CLINIC | Facility: CLINIC | Age: 29
End: 2019-12-09

## 2019-12-09 NOTE — TELEPHONE ENCOUNTER
Pt is going to follow up with her pcp for her ppar depression, but needs 1 more fill on her buspar until she can get into to them

## 2019-12-10 ENCOUNTER — TELEPHONE (OUTPATIENT)
Dept: OBGYN CLINIC | Facility: CLINIC | Age: 29
End: 2019-12-10

## 2019-12-10 DIAGNOSIS — F41.9 ANXIETY: ICD-10-CM

## 2019-12-10 RX ORDER — BUSPIRONE HYDROCHLORIDE 7.5 MG/1
7.5 TABLET ORAL 2 TIMES DAILY
Qty: 60 TABLET | Refills: 0 | Status: SHIPPED | OUTPATIENT
Start: 2019-12-10 | End: 2020-01-27

## 2019-12-10 NOTE — TELEPHONE ENCOUNTER
Incoming call from patient  Patient is taking Buspar for PP Depression  Discussed recently with Dr Richard Barone  Patient states the Buspar make her feel very angry and agitated  Does not feel any relief of symptoms  Patient was tearful  Denies HI/SI  Seeing therapist  States she completley forgot about her follow up appt with Dr Richard Barone as she is overwhelmed  Asking if she can go back on Zoloft and Xanax as these were helping her  Appt scheduled with Dr Richard Barone for tomorrow however she is asking if it would be possible to just speak with him via telephone  Advised I will route to Dr Richard Barone as requested

## 2019-12-10 NOTE — TELEPHONE ENCOUNTER
Patient did not keep her follow-up appointment  I am uncomfortable with giving her Xanax  Switching her medications back and forth will not help  Let's see if she shows up to her appointment

## 2019-12-10 NOTE — TELEPHONE ENCOUNTER
Per Dr Morgan Aguiar via tiger text, patient to keep scheduled appt for tomorrow in [place] location  Advised patient if she needs anything further may call on call provider this evening

## 2019-12-11 ENCOUNTER — TELEPHONE (OUTPATIENT)
Dept: OBGYN CLINIC | Facility: CLINIC | Age: 29
End: 2019-12-11

## 2019-12-11 ENCOUNTER — OFFICE VISIT (OUTPATIENT)
Dept: OBGYN CLINIC | Facility: MEDICAL CENTER | Age: 29
End: 2019-12-11
Payer: COMMERCIAL

## 2019-12-11 VITALS
RESPIRATION RATE: 14 BRPM | HEIGHT: 62 IN | WEIGHT: 212.6 LBS | BODY MASS INDEX: 39.12 KG/M2 | DIASTOLIC BLOOD PRESSURE: 80 MMHG | SYSTOLIC BLOOD PRESSURE: 126 MMHG

## 2019-12-11 DIAGNOSIS — E88.09 SERUM ALBUMIN DECREASED: ICD-10-CM

## 2019-12-11 DIAGNOSIS — F41.9 ANXIETY: ICD-10-CM

## 2019-12-11 DIAGNOSIS — R45.4 EXCESSIVE ANGER: ICD-10-CM

## 2019-12-11 PROCEDURE — 99213 OFFICE O/P EST LOW 20 MIN: CPT | Performed by: OBSTETRICS & GYNECOLOGY

## 2019-12-11 RX ORDER — ALPRAZOLAM 0.25 MG/1
0.25 TABLET ORAL
Qty: 15 TABLET | Refills: 0 | Status: SHIPPED | OUTPATIENT
Start: 2019-12-11 | End: 2020-01-27 | Stop reason: SDUPTHER

## 2019-12-11 NOTE — PROGRESS NOTES
Assessment/Plan:      Diagnoses and all orders for this visit:    Post partum depression  -     Cancel: TSH, 3rd generation with Free T4 reflex; Future  -     Cancel: Comprehensive metabolic panel; Future  -     Urinalysis with microscopic; Future    Postpartum depression  -     ALPRAZolam (XANAX) 0 25 mg tablet; Take 1 tablet (0 25 mg total) by mouth daily at bedtime as needed for anxiety    Excessive anger    Anxiety    Serum albumin decreased  -     UA (URINE) with reflex to Scope        Patient to find appointment with a primary care physician ASAP, patient is aware that we will not prescribe benzodiazepines on a long-term basis  This is her last prescription  Increase 7 5Mg Buspar to 2 tablets twice a day  Subjective:     Patient ID: Chang Echavarria is a 34 y o  female  Patient is a 22-year-old female, para 2, here for follow-up on postpartum depression and anxiety  Patient recently delivered by   Her pregnancy was complicated by gestational hypertension and preeclampsia  Patient denies a history of bipolar disorder or feeling manic  Patient's social situation is difficult,  she recently lost her home and her X-in-laws are trying to obtain custody of her 6year-old child  Her 6year-old child lives with X-in-laws  Patient feels betrayed by her X-in-laws and is angry  Patient denies suicidal or homicidal ideations  Patient still finds herself crying her hair is falling out and states the BuSpar is not helping with her anxiety  Patient sees a therapist on a weekly basis named Ara Zhu at Dr Cristin Mejia office  Current medications are B complex prenatal vitamins BuSpar in singular      Review of Systems   Constitutional: Negative for fever  Psychiatric/Behavioral: Positive for dysphoric mood and sleep disturbance  Negative for self-injury and suicidal ideas  The patient is nervous/anxious            Objective:     Physical Exam   Constitutional: She appears well-developed and well-nourished  Pulmonary/Chest: [place] normal    Skin: Skin is warm and dry  Psychiatric: She has a normal mood and affect   Her behavior is normal  Judgment and thought content normal

## 2019-12-11 NOTE — TELEPHONE ENCOUNTER
Pt is calling about her Buspar she saw Dr Aguila Wetzel about today; she had it changed and is now going to be out of pills tonight and won't have any more till Sat  Can we do anything or does she have to call her insurance? Wants to change to [facility] in [place]

## 2019-12-11 NOTE — TELEPHONE ENCOUNTER
Stressed importance of keeping appt today  Patient states she has not liked how she felt on Buspar since she started it  States this was prescribed in place of the Zoloft and Xanax as it works for both anxiety and depression and makes her feel worse  States therapist in agreement

## 2019-12-12 ENCOUNTER — TELEPHONE (OUTPATIENT)
Dept: OBGYN CLINIC | Facility: CLINIC | Age: 29
End: 2019-12-12

## 2019-12-12 NOTE — TELEPHONE ENCOUNTER
I called in Buspar, 7 5 mg, # 112 tabs, sig 2 in am and 2 in pm  Pt will also f/u with pcp  I called rx into [facility]   Pt aware

## 2019-12-12 NOTE — TELEPHONE ENCOUNTER
Patient called stated pharmacy cannot fill her script for Busbar until Saturday  Patient wants to know what to do   Please advise

## 2019-12-12 NOTE — TELEPHONE ENCOUNTER
You wrote in pts chart - will increase Buspar 7 5 mg to two tabs bid    Rx written under medications says take 1 tab bid  She presently has NO Buspar  She has been on it over 1 mo  Not sure why pharm won't fill it - but need to know amt of rx and if I may call in buspar 7 5 mg, # 112 and see if I can get it covered  Pt a bit distressed  -   Thanks  Or you can put in rx also

## 2019-12-18 ENCOUNTER — OFFICE VISIT (OUTPATIENT)
Dept: URGENT CARE | Facility: CLINIC | Age: 29
End: 2019-12-18
Payer: COMMERCIAL

## 2019-12-18 VITALS
SYSTOLIC BLOOD PRESSURE: 124 MMHG | OXYGEN SATURATION: 99 % | WEIGHT: 210 LBS | TEMPERATURE: 98.8 F | BODY MASS INDEX: 38.41 KG/M2 | HEART RATE: 82 BPM | DIASTOLIC BLOOD PRESSURE: 86 MMHG | RESPIRATION RATE: 18 BRPM

## 2019-12-18 DIAGNOSIS — R06.2 WHEEZING: Primary | ICD-10-CM

## 2019-12-18 PROCEDURE — 99283 EMERGENCY DEPT VISIT LOW MDM: CPT | Performed by: NURSE PRACTITIONER

## 2019-12-18 PROCEDURE — G0382 LEV 3 HOSP TYPE B ED VISIT: HCPCS | Performed by: NURSE PRACTITIONER

## 2019-12-18 PROCEDURE — 99203 OFFICE O/P NEW LOW 30 MIN: CPT | Performed by: NURSE PRACTITIONER

## 2019-12-18 RX ORDER — MONTELUKAST SODIUM 10 MG/1
10 TABLET ORAL
Qty: 14 TABLET | Refills: 0 | Status: SHIPPED | OUTPATIENT
Start: 2019-12-18 | End: 2020-01-27 | Stop reason: SDUPTHER

## 2019-12-18 RX ORDER — ALBUTEROL SULFATE 90 UG/1
2 AEROSOL, METERED RESPIRATORY (INHALATION) EVERY 4 HOURS PRN
COMMUNITY
End: 2019-12-18

## 2019-12-18 RX ORDER — ALBUTEROL SULFATE 90 UG/1
2 AEROSOL, METERED RESPIRATORY (INHALATION) EVERY 6 HOURS PRN
Qty: 6.7 G | Refills: 0 | Status: SHIPPED | OUTPATIENT
Start: 2019-12-18 | End: 2020-01-27 | Stop reason: SDUPTHER

## 2019-12-18 NOTE — PROGRESS NOTES
[facility]        NAME: Mireya Rodriguez is a 34 y o  female  : 1990    MRN: [id number]  DATE: 2019  TIME: 12:13 PM    Assessment and Plan   Wheezing [R06 2]  1  Wheezing  montelukast (SINGULAIR) 10 mg tablet    albuterol (PROVENTIL HFA) 90 mcg/act inhaler     Discussed with patient the use of daily rescue inhaler and importance of seeing PCP to get better control of her asthma  Return precautions discussed in length with patient  Patient verbalizes understanding  Patient refused prednisone at this time  Patient Instructions     Patient Instructions     Use inhaler as needed  Keep PCP in 2 weeks  If you develop any sob, wheezing, chest pain, fever, cough, or any worsening symptoms return or proceed to ER  Asthma   WHAT YOU NEED TO KNOW:   Asthma is a lung disease that makes breathing difficult  Chronic inflammation and reactions to triggers narrow the airways in the lungs  Asthma can become life-threatening if it is not managed  DISCHARGE INSTRUCTIONS:   Return to the emergency department if:   · You have severe shortness of breath  · Your lips or nails turn blue or gray  · The skin around your neck and ribs pulls in with each breath  · You have shortness of breath, even after you take your short-term medicine as directed  · Your peak flow numbers are in the red zone of your AAP  Contact your healthcare provider if:   · You run out of medicine before your next refill is due  · Your symptoms get worse  · You need to take more medicine than usual to control your symptoms  · You have questions or concerns about your condition or care  Medicines:   · Medicines  decrease inflammation, open airways, and make it easier to breathe  Medicines may be inhaled, taken as a pill, or injected  Short-term medicines relieve your symptoms quickly  Long-term medicines are used to prevent future attacks   You may also need medicine to help control your allergies  Ask your healthcare provider for more information about the medicine you are given and how to take it safely  · Take your medicine as directed  Contact your healthcare provider if you think your medicine is not helping or if you have side effects  Tell him of her if you are allergic to any medicine  Keep a list of the medicines, vitamins, and herbs you take  Include the amounts, and when and why you take them  Bring the list or the pill bottles to follow-up visits  Carry your medicine list with you in case of an emergency  Follow up with your healthcare provider as directed: You will need to return to make sure your medicine is working and your symptoms are controlled  You may be referred to an asthma specialist  Jagdeep Farah may be asked to keep a record of your peak flow values and bring it with you to your appointments  Write down your questions so you remember to ask them during your visits  Manage your symptoms and prevent future attacks:   · Follow your Asthma Action Plan (AAP)  This is a written plan that you and your healthcare provider create  It explains which medicine you need and when to change doses if necessary  It also explains how you can monitor symptoms and use a peak flow meter  The meter measures how well your lungs are working  · Manage other health conditions , such as allergies, acid reflux, and sleep apnea  · Identify and avoid triggers  These may include pets, dust mites, mold, and cockroaches  · Do not smoke or be around others who smoke  Nicotine and other chemicals in cigarettes and cigars can cause lung damage  Ask your healthcare provider for information if you currently smoke and need help to quit  E-cigarettes or smokeless tobacco still contain nicotine  Talk to your healthcare provider before you use these products  · Ask about the flu vaccine  The flu can make your asthma worse  You may need a yearly flu shot    © 2017 [employer]  Information is for End User's use only and may not be sold, redistributed or otherwise used for commercial purposes  All illustrations and images included in CareNotes® are the copyrighted property of A D A M , Inc  or [employer]  The above information is an  only  It is not intended as medical advice for individual conditions or treatments  Talk to your doctor, nurse or pharmacist before following any medical regimen to see if it is safe and effective for you  Follow up with PCP in 3-5 days  Proceed to  ER if symptoms worsen  Chief Complaint     Chief Complaint   Patient presents with    Asthma     Pt states she is her because she ran out of her asthma medication and she takes it everyday          History of Present Illness       Patient is a 63-year-old female who presents for refill of her inhaler and singular  Patient states that she has an appointment on January 2nd with a PCP to establish care  Patient states that she ran out of her Singulair approximately 1 month ago and ran out of her inhaler yesterday  Patient states that since she ran out of her Singulair she has been requiring her rescue inhaler daily  Patient denies feeling short of breath at this time  Denies any difficulty breathing, chest pain, chest tightness, or palpitations  Patient denies any fever, chills, or body aches  Patient denies any URI symptoms  Review of Systems   Review of Systems   Constitutional: Negative for chills, diaphoresis, fatigue and fever  HENT: Negative for congestion, ear pain, facial swelling, mouth sores, postnasal drip, rhinorrhea, sinus pain, sore throat and trouble swallowing  Eyes: Negative for photophobia, pain, discharge, redness, itching and visual disturbance  Respiratory: Positive for cough and wheezing  Negative for chest tightness, shortness of breath and stridor  Cardiovascular: Negative for chest pain and palpitations     Gastrointestinal: Negative for abdominal pain, diarrhea, nausea and vomiting  Genitourinary: Negative  Musculoskeletal: Negative for arthralgias, back pain, joint swelling, myalgias, neck pain and neck stiffness  Skin: Negative for rash  Neurological: Negative for dizziness, weakness, light-headedness, numbness and headaches           Current Medications       Current Outpatient Medications:     ALPRAZolam (XANAX) 0 25 mg tablet, Take 1 tablet (0 25 mg total) by mouth daily at bedtime as needed for anxiety, Disp: 15 tablet, Rfl: 0    b complex vitamins capsule, Take 1 capsule by mouth daily, Disp: , Rfl:     busPIRone (BUSPAR) 7 5 mg tablet, Take 1 tablet (7 5 mg total) by mouth 2 (two) times a day, Disp: 60 tablet, Rfl: 0    Prenatal Vit-Fe Fumarate-FA (PRENATAL VITAMIN PO), Take by mouth, Disp: , Rfl:     albuterol (PROVENTIL HFA) 90 mcg/act inhaler, Inhale 2 puffs every 6 (six) hours as needed for wheezing, Disp: 6 7 g, Rfl: 0    ibuprofen (MOTRIN) 600 mg tablet, Take 1 tablet (600 mg total) by mouth every 6 (six) hours as needed for mild pain, moderate pain, fever or headaches (cramping) (Patient not taking: Reported on 12/11/2019), Disp: 60 tablet, Rfl: 3    montelukast (SINGULAIR) 10 mg tablet, Take 1 tablet (10 mg total) by mouth daily at bedtime, Disp: 14 tablet, Rfl: 0    sertraline (ZOLOFT) 25 mg tablet, Take 1 tablet (25 mg total) by mouth daily (Patient not taking: Reported on 11/27/2019), Disp: 30 tablet, Rfl: 2    Current Allergies     Allergies as of 12/18/2019 - Reviewed 12/18/2019   Allergen Reaction Noted    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    Nuts Other (See Comments) 04/11/2017    Pineapple Anaphylaxis 04/11/2017    Shellfish allergy Other (See Comments) 04/11/2017    Shellfish-derived products Anaphylaxis 04/11/2017    Other Hives and Rash 04/11/2017    Raspberry Hives and Rash 04/11/2017    Penicillins Rash 10/06/2013            The following portions of the patient's history were reviewed and updated as appropriate: allergies, current medications, past family history, past medical history, past social history, past surgical history and problem list      Past Medical History:   Diagnosis Date    Anemia     Anemia during pregnancy   Anxiety     Asthma     Depression     Postpartum depression   Kidney infection     Kidney stone     Miscarriage     MRSA infection     Preeclampsia 9/10/2019    Rh incompatibility     Urinary tract infection     Varicella     Pt said she had chicken pox twice when she was younger  Past Surgical History:   Procedure Laterality Date    ADENOIDECTOMY       SECTION      DILATION AND CURETTAGE OF UTERUS      MA  DELIVERY ONLY N/A 9/3/2019    Procedure:  SECTION () REPEAT;  Surgeon: Eveline Harrison DO;  [facility]: [facility] ;  Service: Obstetrics    MA LIGATION,FALLOPIAN TUBE W/ Bilateral 9/3/2019    Procedure: LIGATION/COAGULATION TUBAL;  Surgeon: Eveline Harrison DO;  Location: [facility] ;  Service: Obstetrics    TONSILLECTOMY         Family History   Problem Relation Age of Onset    No Known Problems Mother     Heart disease Father     Heart attack Father     Stroke Father     No Known Problems Daughter     No Known Problems Son     Diverticulitis Maternal Grandmother     Diabetes Maternal Grandfather     Aneurysm Maternal Grandfather     No Known Problems Paternal Grandmother     Heart disease Paternal Grandfather     Heart attack Paternal Grandfather     Heart disease Family     Crohn's disease Paternal Aunt          Medications have been verified  Objective   /86   Pulse 82   Temp 98 8 °F (37 1 °C) (Temporal)   Resp 18   Wt 95 3 kg (210 lb)   LMP 12/10/2019   SpO2 99%   BMI 38 41 kg/m²        Physical Exam     Physical Exam   Constitutional: She is oriented to person, place, and time  She appears well-developed and well-nourished  No distress     HENT:   Head: Normocephalic and atraumatic  Right Ear: Hearing, tympanic membrane, external ear and ear canal normal    Left Ear: Hearing, tympanic membrane, external ear and ear canal normal    Nose: Nose normal  Right sinus exhibits no maxillary sinus tenderness and no frontal sinus tenderness  Left sinus exhibits no maxillary sinus tenderness and no frontal sinus tenderness  Mouth/Throat: Uvula is midline, oropharynx is clear and moist and mucous membranes are normal    Cardiovascular: Normal rate, regular rhythm, S1 normal, S2 normal, normal heart sounds, intact distal pulses and normal pulses  Pulmonary/Chest: Effort normal  No accessory muscle usage  No tachypnea and no bradypnea  No respiratory distress  She has wheezes (mild expiratory) in the right lower field and the left lower field  Lymphadenopathy:     She has no cervical adenopathy  Neurological: She is alert and oriented to person, place, and time  GCS eye subscore is 4  GCS verbal subscore is 5  GCS motor subscore is 6  Skin: Skin is warm and dry  Capillary refill takes less than 2 seconds  She is not diaphoretic

## 2019-12-18 NOTE — PATIENT INSTRUCTIONS
Use inhaler as needed  Keep PCP in 2 weeks  If you develop any sob, wheezing, chest pain, fever, cough, or any worsening symptoms return or proceed to ER  Asthma   WHAT YOU NEED TO KNOW:   Asthma is a lung disease that makes breathing difficult  Chronic inflammation and reactions to triggers narrow the airways in the lungs  Asthma can become life-threatening if it is not managed  DISCHARGE INSTRUCTIONS:   Return to the emergency department if:   · You have severe shortness of breath  · Your lips or nails turn blue or gray  · The skin around your neck and ribs pulls in with each breath  · You have shortness of breath, even after you take your short-term medicine as directed  · Your peak flow numbers are in the red zone of your AAP  Contact your healthcare provider if:   · You run out of medicine before your next refill is due  · Your symptoms get worse  · You need to take more medicine than usual to control your symptoms  · You have questions or concerns about your condition or care  Medicines:   · Medicines  decrease inflammation, open airways, and make it easier to breathe  Medicines may be inhaled, taken as a pill, or injected  Short-term medicines relieve your symptoms quickly  Long-term medicines are used to prevent future attacks  You may also need medicine to help control your allergies  Ask your healthcare provider for more information about the medicine you are given and how to take it safely  · Take your medicine as directed  Contact your healthcare provider if you think your medicine is not helping or if you have side effects  Tell him of her if you are allergic to any medicine  Keep a list of the medicines, vitamins, and herbs you take  Include the amounts, and when and why you take them  Bring the list or the pill bottles to follow-up visits  Carry your medicine list with you in case of an emergency  Follow up with your healthcare provider as directed:   You will need to return to make sure your medicine is working and your symptoms are controlled  You may be referred to an asthma specialist  Anna Mcdermott may be asked to keep a record of your peak flow values and bring it with you to your appointments  Write down your questions so you remember to ask them during your visits  Manage your symptoms and prevent future attacks:   · Follow your Asthma Action Plan (AAP)  This is a written plan that you and your healthcare provider create  It explains which medicine you need and when to change doses if necessary  It also explains how you can monitor symptoms and use a peak flow meter  The meter measures how well your lungs are working  · Manage other health conditions , such as allergies, acid reflux, and sleep apnea  · Identify and avoid triggers  These may include pets, dust mites, mold, and cockroaches  · Do not smoke or be around others who smoke  Nicotine and other chemicals in cigarettes and cigars can cause lung damage  Ask your healthcare provider for information if you currently smoke and need help to quit  E-cigarettes or smokeless tobacco still contain nicotine  Talk to your healthcare provider before you use these products  · Ask about the flu vaccine  The flu can make your asthma worse  You may need a yearly flu shot  © 2017 [employer] Information is for End User's use only and may not be sold, redistributed or otherwise used for commercial purposes  All illustrations and images included in CareNotes® are the copyrighted property of A [employer] M , Inc  or [employer]gang Tracy  The above information is an  only  It is not intended as medical advice for individual conditions or treatments  Talk to your doctor, nurse or pharmacist before following any medical regimen to see if it is safe and effective for you

## 2019-12-18 NOTE — PROGRESS NOTES
Working the over due bin  Pt contacted # [phone] vm- per hippa communication consent on file- lmom advising pt friendly reminder lab is due and mailing order to address on file as a reminder

## 2020-01-15 ENCOUNTER — HOSPITAL ENCOUNTER (EMERGENCY)
Facility: HOSPITAL | Age: 30
Discharge: HOME | End: 2020-01-15
Attending: EMERGENCY MEDICINE | Admitting: EMERGENCY MEDICINE
Payer: COMMERCIAL

## 2020-01-15 ENCOUNTER — TRANSCRIBE ORDERS (OUTPATIENT)
Dept: LAB | Facility: CLINIC | Age: 30
End: 2020-01-15

## 2020-01-15 ENCOUNTER — APPOINTMENT (EMERGENCY)
Dept: CT IMAGING | Facility: HOSPITAL | Age: 30
End: 2020-01-15
Payer: COMMERCIAL

## 2020-01-15 ENCOUNTER — OFFICE VISIT (OUTPATIENT)
Dept: URGENT CARE | Facility: CLINIC | Age: 30
End: 2020-01-15
Payer: COMMERCIAL

## 2020-01-15 VITALS
OXYGEN SATURATION: 98 % | SYSTOLIC BLOOD PRESSURE: 136 MMHG | BODY MASS INDEX: 38.63 KG/M2 | DIASTOLIC BLOOD PRESSURE: 93 MMHG | TEMPERATURE: 98.1 F | WEIGHT: 211.2 LBS | RESPIRATION RATE: 18 BRPM | HEART RATE: 83 BPM

## 2020-01-15 VITALS
SYSTOLIC BLOOD PRESSURE: 138 MMHG | TEMPERATURE: 99 F | RESPIRATION RATE: 18 BRPM | DIASTOLIC BLOOD PRESSURE: 80 MMHG | BODY MASS INDEX: 38.23 KG/M2 | HEART RATE: 84 BPM | OXYGEN SATURATION: 98 % | WEIGHT: 209 LBS

## 2020-01-15 DIAGNOSIS — R10.9 RIGHT FLANK PAIN: Primary | ICD-10-CM

## 2020-01-15 DIAGNOSIS — R10.9 FLANK PAIN: Primary | ICD-10-CM

## 2020-01-15 DIAGNOSIS — N20.0 KIDNEY STONE: ICD-10-CM

## 2020-01-15 LAB
ALBUMIN SERPL BCP-MCNC: 3.7 G/DL (ref 3.5–5)
ALP SERPL-CCNC: 71 U/L (ref 46–116)
ALT SERPL W P-5'-P-CCNC: 39 U/L (ref 12–78)
ANION GAP SERPL CALCULATED.3IONS-SCNC: 8 MMOL/L (ref 4–13)
AST SERPL W P-5'-P-CCNC: 20 U/L (ref 5–45)
BACTERIA UR QL AUTO: ABNORMAL /HPF
BASOPHILS # BLD AUTO: 0.03 THOUSANDS/ΜL (ref 0–0.1)
BASOPHILS NFR BLD AUTO: 0 % (ref 0–1)
BILIRUB SERPL-MCNC: 0.3 MG/DL (ref 0.2–1)
BILIRUB UR QL STRIP: NEGATIVE
BUN SERPL-MCNC: 14 MG/DL (ref 5–25)
CALCIUM SERPL-MCNC: 9.1 MG/DL (ref 8.3–10.1)
CHLORIDE SERPL-SCNC: 101 MMOL/L (ref 100–108)
CLARITY UR: CLEAR
CO2 SERPL-SCNC: 29 MMOL/L (ref 21–32)
COLOR UR: ABNORMAL
CREAT SERPL-MCNC: 1.18 MG/DL (ref 0.6–1.3)
EOSINOPHIL # BLD AUTO: 0.09 THOUSAND/ΜL (ref 0–0.61)
EOSINOPHIL NFR BLD AUTO: 1 % (ref 0–6)
ERYTHROCYTE [DISTWIDTH] IN BLOOD BY AUTOMATED COUNT: 15.1 % (ref 11.6–15.1)
EXT PREG TEST URINE: NEGATIVE
EXT. CONTROL ED NAV: NORMAL
GFR SERPL CREATININE-BSD FRML MDRD: 62 ML/MIN/1.73SQ M
GLUCOSE SERPL-MCNC: 91 MG/DL (ref 65–140)
GLUCOSE UR STRIP-MCNC: NEGATIVE MG/DL
HCT VFR BLD AUTO: 38.3 % (ref 34.8–46.1)
HGB BLD-MCNC: 11.7 G/DL (ref 11.5–15.4)
HGB UR QL STRIP.AUTO: ABNORMAL
IMM GRANULOCYTES # BLD AUTO: 0.03 THOUSAND/UL (ref 0–0.2)
IMM GRANULOCYTES NFR BLD AUTO: 0 % (ref 0–2)
KETONES UR STRIP-MCNC: NEGATIVE MG/DL
LEUKOCYTE ESTERASE UR QL STRIP: NEGATIVE
LYMPHOCYTES # BLD AUTO: 1.71 THOUSANDS/ΜL (ref 0.6–4.47)
LYMPHOCYTES NFR BLD AUTO: 24 % (ref 14–44)
MCH RBC QN AUTO: 25.3 PG (ref 26.8–34.3)
MCHC RBC AUTO-ENTMCNC: 30.5 G/DL (ref 31.4–37.4)
MCV RBC AUTO: 83 FL (ref 82–98)
MONOCYTES # BLD AUTO: 0.69 THOUSAND/ΜL (ref 0.17–1.22)
MONOCYTES NFR BLD AUTO: 10 % (ref 4–12)
NEUTROPHILS # BLD AUTO: 4.65 THOUSANDS/ΜL (ref 1.85–7.62)
NEUTS SEG NFR BLD AUTO: 65 % (ref 43–75)
NITRITE UR QL STRIP: NEGATIVE
NON-SQ EPI CELLS URNS QL MICRO: ABNORMAL /HPF
NRBC BLD AUTO-RTO: 0 /100 WBCS
PH UR STRIP.AUTO: 6 [PH]
PLATELET # BLD AUTO: 266 THOUSANDS/UL (ref 149–390)
PMV BLD AUTO: 9.5 FL (ref 8.9–12.7)
POTASSIUM SERPL-SCNC: 3.6 MMOL/L (ref 3.5–5.3)
PROT SERPL-MCNC: 7.9 G/DL (ref 6.4–8.2)
PROT UR STRIP-MCNC: ABNORMAL MG/DL
RBC # BLD AUTO: 4.63 MILLION/UL (ref 3.81–5.12)
RBC #/AREA URNS AUTO: ABNORMAL /HPF
SL AMB  POCT GLUCOSE, UA: ABNORMAL
SL AMB LEUKOCYTE ESTERASE,UA: ABNORMAL
SL AMB POCT BILIRUBIN,UA: ABNORMAL
SL AMB POCT BLOOD,UA: ABNORMAL
SL AMB POCT CLARITY,UA: CLEAR
SL AMB POCT COLOR,UA: ABNORMAL
SL AMB POCT KETONES,UA: ABNORMAL
SL AMB POCT NITRITE,UA: ABNORMAL
SL AMB POCT PH,UA: 6.5
SL AMB POCT SPECIFIC GRAVITY,UA: 1.01
SL AMB POCT URINE PROTEIN: 30
SL AMB POCT UROBILINOGEN: 0.2
SODIUM SERPL-SCNC: 138 MMOL/L (ref 136–145)
SP GR UR STRIP.AUTO: 1.01 (ref 1–1.03)
UROBILINOGEN UR QL STRIP.AUTO: 0.2 E.U./DL
WBC # BLD AUTO: 7.2 THOUSAND/UL (ref 4.31–10.16)
WBC #/AREA URNS AUTO: ABNORMAL /HPF

## 2020-01-15 PROCEDURE — 99284 EMERGENCY DEPT VISIT MOD MDM: CPT

## 2020-01-15 PROCEDURE — 81025 URINE PREGNANCY TEST: CPT | Performed by: EMERGENCY MEDICINE

## 2020-01-15 PROCEDURE — 96376 TX/PRO/DX INJ SAME DRUG ADON: CPT

## 2020-01-15 PROCEDURE — 81001 URINALYSIS AUTO W/SCOPE: CPT | Performed by: EMERGENCY MEDICINE

## 2020-01-15 PROCEDURE — 96374 THER/PROPH/DIAG INJ IV PUSH: CPT

## 2020-01-15 PROCEDURE — 74176 CT ABD & PELVIS W/O CONTRAST: CPT

## 2020-01-15 PROCEDURE — 96361 HYDRATE IV INFUSION ADD-ON: CPT

## 2020-01-15 PROCEDURE — 36415 COLL VENOUS BLD VENIPUNCTURE: CPT | Performed by: EMERGENCY MEDICINE

## 2020-01-15 PROCEDURE — 99283 EMERGENCY DEPT VISIT LOW MDM: CPT | Performed by: PHYSICIAN ASSISTANT

## 2020-01-15 PROCEDURE — 85025 COMPLETE CBC W/AUTO DIFF WBC: CPT | Performed by: EMERGENCY MEDICINE

## 2020-01-15 PROCEDURE — 81002 URINALYSIS NONAUTO W/O SCOPE: CPT | Performed by: PHYSICIAN ASSISTANT

## 2020-01-15 PROCEDURE — G0382 LEV 3 HOSP TYPE B ED VISIT: HCPCS | Performed by: PHYSICIAN ASSISTANT

## 2020-01-15 PROCEDURE — 96375 TX/PRO/DX INJ NEW DRUG ADDON: CPT

## 2020-01-15 PROCEDURE — 99285 EMERGENCY DEPT VISIT HI MDM: CPT | Performed by: EMERGENCY MEDICINE

## 2020-01-15 PROCEDURE — 80053 COMPREHEN METABOLIC PANEL: CPT | Performed by: EMERGENCY MEDICINE

## 2020-01-15 RX ORDER — NAPROXEN 500 MG/1
500 TABLET ORAL 2 TIMES DAILY PRN
Qty: 20 TABLET | Refills: 0 | Status: SHIPPED | OUTPATIENT
Start: 2020-01-15 | End: 2020-01-27

## 2020-01-15 RX ORDER — KETOROLAC TROMETHAMINE 30 MG/ML
30 INJECTION, SOLUTION INTRAMUSCULAR; INTRAVENOUS ONCE
Status: COMPLETED | OUTPATIENT
Start: 2020-01-15 | End: 2020-01-15

## 2020-01-15 RX ORDER — ONDANSETRON 4 MG/1
4 TABLET, ORALLY DISINTEGRATING ORAL ONCE
Status: COMPLETED | OUTPATIENT
Start: 2020-01-15 | End: 2020-01-15

## 2020-01-15 RX ORDER — FENTANYL CITRATE 50 UG/ML
50 INJECTION, SOLUTION INTRAMUSCULAR; INTRAVENOUS ONCE
Status: COMPLETED | OUTPATIENT
Start: 2020-01-15 | End: 2020-01-15

## 2020-01-15 RX ORDER — ONDANSETRON 2 MG/ML
4 INJECTION INTRAMUSCULAR; INTRAVENOUS ONCE
Status: COMPLETED | OUTPATIENT
Start: 2020-01-15 | End: 2020-01-15

## 2020-01-15 RX ORDER — TRAMADOL HYDROCHLORIDE 50 MG/1
50 TABLET ORAL ONCE
Status: COMPLETED | OUTPATIENT
Start: 2020-01-15 | End: 2020-01-15

## 2020-01-15 RX ADMIN — ONDANSETRON 4 MG: 2 INJECTION INTRAMUSCULAR; INTRAVENOUS at 20:26

## 2020-01-15 RX ADMIN — ONDANSETRON 4 MG: 4 TABLET, ORALLY DISINTEGRATING ORAL at 12:16

## 2020-01-15 RX ADMIN — ONDANSETRON 4 MG: 2 INJECTION INTRAMUSCULAR; INTRAVENOUS at 21:37

## 2020-01-15 RX ADMIN — TRAMADOL HYDROCHLORIDE 50 MG: 50 TABLET, FILM COATED ORAL at 21:37

## 2020-01-15 RX ADMIN — SODIUM CHLORIDE 1000 ML: 0.9 INJECTION, SOLUTION INTRAVENOUS at 20:10

## 2020-01-15 RX ADMIN — FENTANYL CITRATE 50 MCG: 50 INJECTION, SOLUTION INTRAMUSCULAR; INTRAVENOUS at 20:17

## 2020-01-15 RX ADMIN — KETOROLAC TROMETHAMINE 30 MG: 30 INJECTION, SOLUTION INTRAMUSCULAR at 20:10

## 2020-01-15 NOTE — LETTER
January 15, 2020     Patient: Monica Rodriguez   YOB: 1990   Date of Visit: 1/15/2020       To Whom it May Concern:    Monica Rodriguez was seen in my clinic on 1/15/2020  Please excuse patient on 01/14/2020 and 01/15/2020 from work  If you have any questions or concerns, please don't hesitate to call           Sincerely,          Niurka Hartman PA-C        CC: No Recipients
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
## 2020-01-16 NOTE — ED NOTES
Patient transported to [address] [place] [address]  [facility]  01/15/20 0600

## 2020-01-16 NOTE — ED PROVIDER NOTES
History  Chief Complaint   Patient presents with    Flank Pain     c/o R sided flank and lower back pain x1 week, seen at urgent care, referred to ED for eval d/t blood in urine  35 yo female with R flank pain and lower back pain x 1 week  Pt denies urinary symptoms  History provided by:  Patient   used: No    Flank Pain   Pain location:  R flank  Pain quality: aching    Pain radiates to:  Does not radiate  Pain severity:  Moderate  Onset quality:  Gradual  Duration:  1 week  Timing:  Constant  Progression:  Waxing and waning  Chronicity:  New  Relieved by:  Nothing  Worsened by:  Nothing  Ineffective treatments:  None tried  Associated symptoms: no chest pain, no chills, no diarrhea, no dysuria, no fatigue, no fever, no hematuria, no nausea, no shortness of breath, no sore throat, no vaginal bleeding, no vaginal discharge and no vomiting        Prior to Admission Medications   Prescriptions Last Dose Informant Patient Reported? Taking?    ALPRAZolam (XANAX) 0 25 mg tablet   No No   Sig: Take 1 tablet (0 25 mg total) by mouth daily at bedtime as needed for anxiety   Prenatal Vit-Fe Fumarate-FA (PRENATAL VITAMIN PO)  Self Yes No   Sig: Take by mouth   albuterol (PROVENTIL HFA) 90 mcg/act inhaler   No No   Sig: Inhale 2 puffs every 6 (six) hours as needed for wheezing   b complex vitamins capsule  Self Yes No   Sig: Take 1 capsule by mouth daily   busPIRone (BUSPAR) 7 5 mg tablet   No No   Sig: Take 1 tablet (7 5 mg total) by mouth 2 (two) times a day   ibuprofen (MOTRIN) 600 mg tablet  Self No No   Sig: Take 1 tablet (600 mg total) by mouth every 6 (six) hours as needed for mild pain, moderate pain, fever or headaches (cramping)   montelukast (SINGULAIR) 10 mg tablet   No No   Sig: Take 1 tablet (10 mg total) by mouth daily at bedtime   sertraline (ZOLOFT) 25 mg tablet  Self No No   Sig: Take 1 tablet (25 mg total) by mouth daily   Patient not taking: Reported on 11/27/2019 Facility-Administered Medications Last Administration Doses Remaining   ondansetron (ZOFRAN-ODT) dispersible tablet 4 mg 1/15/2020 12:16 PM 0          Past Medical History:   Diagnosis Date    Anemia     Anemia during pregnancy   Anxiety     Asthma     Depression     Postpartum depression   Kidney infection     Kidney stone     Miscarriage     MRSA infection     Preeclampsia 9/10/2019    Rh incompatibility     Urinary tract infection     Varicella     Pt said she had chicken pox twice when she was younger  Past Surgical History:   Procedure Laterality Date    ADENOIDECTOMY       SECTION      DILATION AND CURETTAGE OF UTERUS      AK  DELIVERY ONLY N/A 9/3/2019    Procedure:  SECTION () REPEAT;  Surgeon: Nelly Bear DO;  [facility] ;  Service: Obstetrics    AK LIGATION,FALLOPIAN TUBE W/ Bilateral 9/3/2019    Procedure: LIGATION/COAGULATION TUBAL;  Surgeon: Nelly Bear DO;  Location[facility] ;  Service: Obstetrics    TONSILLECTOMY         Family History   Problem Relation Age of Onset    No Known Problems Mother     Heart disease Father     Heart attack Father     Stroke Father     No Known Problems Daughter     No Known Problems Son     Diverticulitis Maternal Grandmother     Diabetes Maternal Grandfather     Aneurysm Maternal Grandfather     No Known Problems Paternal Grandmother     Heart disease Paternal Grandfather     Heart attack Paternal Grandfather     Heart disease Family     Crohn's disease Paternal Aunt      I have reviewed and agree with the history as documented      Social History     Tobacco Use    Smoking status: Never Smoker    Smokeless tobacco: Never Used   Substance Use Topics    Alcohol use: No     Comment: socially    Drug use: Not Currently     Types: Marijuana     Comment: last use approx 2018        Review of Systems   Constitutional: Negative for appetite change, chills, fatigue and fever  HENT: Negative for sore throat  Eyes: Negative for visual disturbance  Respiratory: Negative for shortness of breath  Cardiovascular: Negative for chest pain  Gastrointestinal: Negative for abdominal pain, diarrhea, nausea and vomiting  Genitourinary: Positive for flank pain (R sided)  Negative for dysuria, frequency, hematuria, vaginal bleeding and vaginal discharge  Musculoskeletal: Positive for back pain (lower back R>L)  Negative for neck pain and neck stiffness  Skin: Negative for pallor and rash  Allergic/Immunologic: Negative for immunocompromised state  Neurological: Negative for light-headedness and headaches  Psychiatric/Behavioral: Negative for confusion  All other systems reviewed and are negative  Physical Exam  Physical Exam   Constitutional: She is oriented to person, place, and time  She appears well-developed and well-nourished  No distress  HENT:   Head: Normocephalic and atraumatic  Right Ear: External ear normal    Left Ear: External ear normal    Mouth/Throat: Oropharynx is clear and moist    Eyes: EOM are normal    Neck: Neck supple  Cardiovascular: Normal rate and regular rhythm  No murmur heard  Pulmonary/Chest: Effort normal and breath sounds normal  No respiratory distress  Abdominal: Soft  Bowel sounds are normal    Musculoskeletal: Normal range of motion  She exhibits tenderness  Edema: R CVA tenderness  Neurological: She is alert and oriented to person, place, and time  Skin: Skin is warm  No rash noted  No pallor  Psychiatric: She has a normal mood and affect  Her behavior is normal    Nursing note and vitals reviewed        Vital Signs  ED Triage Vitals   Temperature Pulse Respirations Blood Pressure SpO2   01/15/20 1729 01/15/20 1729 01/15/20 1729 01/15/20 1729 01/15/20 1729   98 6 °F (37 °C) 86 18 155/96 98 %      Temp Source Heart Rate Source Patient Position - Orthostatic VS BP Location FiO2 (%)   01/15/20 1729 01/15/20 2019 01/15/20 2019 01/15/20 2019 --   Oral Monitor Lying Right arm       Pain Score       01/15/20 1729       Worst Possible Pain           Vitals:    01/15/20 1729 01/15/20 2019   BP: 155/96 136/93   Pulse: 86 83   Patient Position - Orthostatic VS:  Lying         Visual Acuity      ED Medications  Medications   sodium chloride 0 9 % bolus 1,000 mL (0 mL Intravenous Stopped 1/15/20 2137)   ketorolac (TORADOL) injection 30 mg (30 mg Intravenous Given 1/15/20 2010)   fentanyl citrate (PF) 100 MCG/2ML 50 mcg (50 mcg Intravenous Given 1/15/20 2017)   ondansetron (ZOFRAN) injection 4 mg (4 mg Intravenous Given 1/15/20 2026)   ondansetron (ZOFRAN) injection 4 mg (4 mg Intravenous Given 1/15/20 2137)   traMADol (ULTRAM) tablet 50 mg (50 mg Oral Given 1/15/20 2137)       Diagnostic Studies  Results Reviewed     Procedure Component Value Units Date/Time    Comprehensive metabolic panel [[id number]] Collected:  01/15/20 2006    Lab Status:  Final result Specimen:  Blood from Arm, Right Updated:  01/15/20 2036     Sodium 138 mmol/L      Potassium 3 6 mmol/L      Chloride 101 mmol/L      CO2 29 mmol/L      ANION GAP 8 mmol/L      BUN 14 mg/dL      Creatinine 1 18 mg/dL      Glucose 91 mg/dL      Calcium 9 1 mg/dL      AST 20 U/L      ALT 39 U/L      Alkaline Phosphatase 71 U/L      Total Protein 7 9 g/dL      Albumin 3 7 g/dL      Total Bilirubin 0 30 mg/dL      eGFR 62 ml/min/1 73sq m     Narrative:       [place] guidelines for Chronic Kidney Disease (CKD):     Stage 1 with normal or high GFR (GFR > 90 mL/min/1 73 square meters)    Stage 2 Mild CKD (GFR = 60-89 mL/min/1 73 square meters)    Stage 3A Moderate CKD (GFR = 45-59 mL/min/1 73 square meters)    Stage 3B Moderate CKD (GFR = 30-44 mL/min/1 73 square meters)    Stage 4 Severe CKD (GFR = 15-29 mL/min/1 73 square meters)    Stage 5 End Stage CKD (GFR <15 mL/min/1 73 square meters)  Note: GFR calculation is accurate only with a steady state creatinine    CBC and differential [[id number]]  (Abnormal) Collected:  01/15/20 2006    Lab Status:  Final result Specimen:  Blood from Arm, Right Updated:  01/15/20 2013     WBC 7 20 Thousand/uL      RBC 4 63 Million/uL      Hemoglobin 11 7 g/dL      Hematocrit 38 3 %      MCV 83 fL      MCH 25 3 pg      MCHC 30 5 g/dL      RDW 15 1 %      MPV 9 5 fL      Platelets 134 Thousands/uL      nRBC 0 /100 WBCs      Neutrophils Relative 65 %      Immat GRANS % 0 %      Lymphocytes Relative 24 %      Monocytes Relative 10 %      Eosinophils Relative 1 %      Basophils Relative 0 %      Neutrophils Absolute 4 65 Thousands/µL      Immature Grans Absolute 0 03 Thousand/uL      Lymphocytes Absolute 1 71 Thousands/µL      Monocytes Absolute 0 69 Thousand/µL      Eosinophils Absolute 0 09 Thousand/µL      Basophils Absolute 0 03 Thousands/µL     Urine Microscopic [[id number]]  (Abnormal) Collected:  01/15/20 1852    Lab Status:  Final result Specimen:  Urine, Clean Catch Updated:  01/15/20 1908     RBC, UA 0-1 /hpf      WBC, UA 1-2 /hpf      Epithelial Cells Occasional /hpf      Bacteria, UA Occasional /hpf     UA w Reflex to Microscopic w Reflex to Culture [[id number]]  (Abnormal) Collected:  01/15/20 1852    Lab Status:  Final result Specimen:  Urine, Clean Catch Updated:  01/15/20 1857     Color, UA Light Yellow     Clarity, UA Clear     Specific Gravity, UA 1 010     pH, UA 6 0     Leukocytes, UA Negative     Nitrite, UA Negative     Protein, UA 30 (1+) mg/dl      Glucose, UA Negative mg/dl      Ketones, UA Negative mg/dl      Urobilinogen, UA 0 2 E U /dl      Bilirubin, UA Negative     Blood, UA Trace-Intact    POCT pregnancy, urine [[id number]]  (Normal) Resulted:  01/15/20 1852    Lab Status:  Final result Updated:  01/15/20 1852     EXT PREG TEST UR (Ref: Negative) negative     Control valid                 CT renal stone study abdomen pelvis without contrast   Final Result by Miky Kelley DO (01/15 2017)   Mild edema surrounding the renal pelvis and proximal ureter on the right  No hydronephrosis or hydroureter  No renal colic identified  Findings most likely reflect recent passage of a stone  The study was marked in [facility] for immediate notification  Workstation performed: [id number]                    Procedures  Procedures         ED Course  ED Course as of Gera 15 2201   Wed Gera 15, 2020   1931 Pt seen and examined  35 yo female with R flank pain and lower back pain x 1 week  Pt denies urinary symptoms  Urine dip in triage + blood only, neg preg  Will give IVF, toradol, fentanyl and check labs, CT r/o stone  2022 CT a/p - Mild edema surrounding the renal pelvis and proximal ureter on the right  No hydronephrosis or hydroureter  No renal colic identified  Findings most likely reflect recent passage of a stone  MDM      Disposition  Final diagnoses:   Right flank pain   Kidney stone     Time reflects when diagnosis was documented in both MDM as applicable and the Disposition within this note     Time User Action Codes Description Comment    1/15/2020  9:01 PM Carlos SOTO Add [R10 9] Right flank pain     1/15/2020  9:01 PM Suzanne Soriano Add [N20 0] Kidney stone       ED Disposition     ED Disposition Condition Date/Time Comment    Discharge Stable Wed Gera 15, 2020  9:01 PM Sondra Munguia discharge to home/self care              Follow-up Information     Follow up With Specialties Details Why [address] [facility], [address] [place] [address] Nurse Practitioner Schedule an appointment as soon as possible for a visit  As needed [address] [facility],[address]  [address]  [phone]            Discharge Medication List as of 1/15/2020  9:02 PM      START taking these medications    Details   naproxen (NAPROSYN) 500 mg tablet Take 1 tablet (500 mg total) by mouth 2 (two) times a day as needed for mild pain or moderate pain for up to 10 days, Starting Wed 1/15/2020, Until Sat 1/25/2020, Print         CONTINUE these medications which have NOT CHANGED    Details   albuterol (PROVENTIL HFA) 90 mcg/act inhaler Inhale 2 puffs every 6 (six) hours as needed for wheezing, Starting Wed 12/18/2019, Normal      ALPRAZolam (XANAX) 0 25 mg tablet Take 1 tablet (0 25 mg total) by mouth daily at bedtime as needed for anxiety, Starting Wed 12/11/2019, Normal      b complex vitamins capsule Take 1 capsule by mouth daily, Historical Med      busPIRone (BUSPAR) 7 5 mg tablet Take 1 tablet (7 5 mg total) by mouth 2 (two) times a day, Starting Tue 12/10/2019, Until u 1/9/2020, Normal      ibuprofen (MOTRIN) 600 mg tablet Take 1 tablet (600 mg total) by mouth every 6 (six) hours as needed for mild pain, moderate pain, fever or headaches (cramping), Starting Fri 9/6/2019, Normal      montelukast (SINGULAIR) 10 mg tablet Take 1 tablet (10 mg total) by mouth daily at bedtime, Starting Wed 12/18/2019, Normal      Prenatal Vit-Fe Fumarate-FA (PRENATAL VITAMIN PO) Take by mouth, Historical Med      sertraline (ZOLOFT) 25 mg tablet Take 1 tablet (25 mg total) by mouth daily, Starting Tue 10/1/2019, Normal           No discharge procedures on file      ED Provider  Electronically Signed by           Gee Carrasquillo DO  01/15/20 2165

## 2020-01-24 NOTE — PROGRESS NOTES
Chart updated per office request  Discrete reportable data documented  Chart updated per office request  Discrete reportable data documented

## 2020-01-27 ENCOUNTER — OFFICE VISIT (OUTPATIENT)
Dept: FAMILY MEDICINE CLINIC | Facility: CLINIC | Age: 30
End: 2020-01-27
Payer: COMMERCIAL

## 2020-01-27 VITALS
DIASTOLIC BLOOD PRESSURE: 82 MMHG | OXYGEN SATURATION: 98 % | SYSTOLIC BLOOD PRESSURE: 126 MMHG | BODY MASS INDEX: 39.01 KG/M2 | HEART RATE: 94 BPM | HEIGHT: 62 IN | TEMPERATURE: 98.2 F | WEIGHT: 212 LBS

## 2020-01-27 DIAGNOSIS — R10.9 RIGHT FLANK PAIN: ICD-10-CM

## 2020-01-27 DIAGNOSIS — R06.2 WHEEZING: ICD-10-CM

## 2020-01-27 DIAGNOSIS — F41.9 ANXIETY: Primary | ICD-10-CM

## 2020-01-27 PROBLEM — L03.311 CELLULITIS OF ABDOMINAL WALL: Status: RESOLVED | Noted: 2019-10-01 | Resolved: 2020-01-27

## 2020-01-27 PROCEDURE — 99204 OFFICE O/P NEW MOD 45 MIN: CPT | Performed by: FAMILY MEDICINE

## 2020-01-27 PROCEDURE — 3008F BODY MASS INDEX DOCD: CPT | Performed by: FAMILY MEDICINE

## 2020-01-27 PROCEDURE — 1036F TOBACCO NON-USER: CPT | Performed by: FAMILY MEDICINE

## 2020-01-27 RX ORDER — MONTELUKAST SODIUM 10 MG/1
10 TABLET ORAL
Qty: 90 TABLET | Refills: 1 | Status: SHIPPED | OUTPATIENT
Start: 2020-01-27 | End: 2020-05-23 | Stop reason: SDUPTHER

## 2020-01-27 RX ORDER — ALBUTEROL SULFATE 90 UG/1
2 AEROSOL, METERED RESPIRATORY (INHALATION) EVERY 6 HOURS PRN
Qty: 18 G | Refills: 1 | Status: SHIPPED | OUTPATIENT
Start: 2020-01-27 | End: 2020-02-27 | Stop reason: SDUPTHER

## 2020-01-27 RX ORDER — ALPRAZOLAM 0.25 MG/1
0.25 TABLET ORAL DAILY PRN
Qty: 30 TABLET | Refills: 0 | Status: SHIPPED | OUTPATIENT
Start: 2020-01-27 | End: 2020-03-20 | Stop reason: SDUPTHER

## 2020-01-27 NOTE — PROGRESS NOTES
Alok Gil 1990 female MRN: [id number]      ASSESSMENT/PLAN  Problem List Items Addressed This Visit        Other    Anxiety - Primary    Postpartum depression    Right flank pain    Wheezing    Relevant Medications    albuterol (PROVENTIL HFA) 90 mcg/act inhaler    montelukast (SINGULAIR) 10 mg tablet        Anxiety: Given pt's sparse Xanax use, will refill  Explained office policy regarding controlled substances  UDS collected  Contract signed  Discussed that pt cannot use both a controlled substance and marijuana given potential interactions/unknown safety of marijuana  Pt expressed understanding  R Flank pain: UA negative for infection/blood  Less likely a stone at this time  Continue good hydration and monitor  Asthma, intermittent: Albuterol, Singulair refills sent  Encouraged to schedule pap  BMI Counseling: Body mass index is 38 78 kg/m²  The BMI is above normal  Nutrition recommendations include decreasing overall calorie intake  Encouraged to cut back on alcohol use due to empty calories  Future Appointments   Date Time Provider Nieves Zamora   4/27/2020 10:40 AM DO [facility]          SUBJECTIVE  CC: Establish Care and Anxiety      HPI:  Alok Gil is a 34 y o  female who presents to establish care  History reviewed and updated as below  Anxiety:   Started as post-partum -- Zoloft gave her more anxiety  Xanax a months worth lasted a few months -- takes half a tab at a time  They switched her to Buspar, but gained weight, made her hair fall out, and made her anxiety worse  Her  takes Celexa  Feels like everyday medication makes her "not me"       R sided flank pain   Seen at [facility] ED on 1/15:   Yesterday had a pain episode 7/10   Had a hard time urinating yesterday   Spotting last week     Asthma:   Albuterol 2-3 times per week due to wheezing   No SOB, cough, nighttime       Review of Systems   Constitutional: Positive for unexpected weight change (gain)  HENT: Negative for congestion, ear pain, rhinorrhea and sore throat  Eyes: Negative for visual disturbance (sees spots)  Respiratory: Positive for wheezing  Negative for cough and shortness of breath  Cardiovascular: Negative for chest pain, palpitations and leg swelling  Gastrointestinal: Negative for abdominal pain, constipation and diarrhea  Endocrine: Negative for polydipsia and polyuria  Genitourinary: Positive for difficulty urinating  Musculoskeletal: Positive for back pain (flank pain)  Neurological: Negative for dizziness and light-headedness  Psychiatric/Behavioral: The patient is nervous/anxious  Historical Information   The patient history was reviewed and updated as follows:    Past Medical History:   Diagnosis Date    Anemia     Anemia during pregnancy   Anxiety     Asthma     Cellulitis of abdominal wall 10/1/2019    Depression     Postpartum depression   Kidney infection     Kidney stone     Miscarriage     MRSA infection     Preeclampsia 9/10/2019    Preeclampsia in postpartum period 2019    Rh incompatibility     Urinary tract infection     Varicella     Pt said she had chicken pox twice when she was younger       Past Surgical History:   Procedure Laterality Date    ADENOIDECTOMY       SECTION      DILATION AND CURETTAGE OF UTERUS      OK  DELIVERY ONLY N/A 9/3/2019    Procedure:  SECTION () REPEAT;  Surgeon: Orly Reilly DO;  [facility] ;  Service: Obstetrics    OK LIGATION,FALLOPIAN TUBE W/ Bilateral 9/3/2019    Procedure: LIGATION/COAGULATION TUBAL;  Surgeon: Orly Reilly DO;  [facility] ;  Service: Obstetrics    TONSILLECTOMY       Family History   Problem Relation Age of Onset    Diverticulitis Mother     Heart disease Father     Heart attack Father     Stroke Father     No Known Problems Daughter     No Known Problems Son     Diverticulitis Maternal Grandmother     Diabetes Maternal Grandfather     Aneurysm Maternal Grandfather     No Known Problems Paternal Grandmother     Heart disease Paternal Grandfather     Heart attack Paternal Grandfather     Heart disease Family     Crohn's disease Paternal Aunt       Social History   Social History     Substance and Sexual Activity   Alcohol Use Yes    Alcohol/week: 2 0 - 3 0 standard drinks    Types: 2 - 3 Cans of beer per week    Frequency: 4 or more times a week     Social History     Substance and Sexual Activity   Drug Use Yes    Types: Marijuana     Social History     Tobacco Use   Smoking Status Never Smoker   Smokeless Tobacco Never Used       Medications:     Current Outpatient Medications:     albuterol (PROVENTIL HFA) 90 mcg/act inhaler, Inhale 2 puffs every 6 (six) hours as needed for wheezing, Disp: 18 g, Rfl: 1    ALPRAZolam (XANAX) 0 25 mg tablet, Take 1 tablet (0 25 mg total) by mouth daily at bedtime as needed for anxiety, Disp: 15 tablet, Rfl: 0    b complex vitamins capsule, Take 1 capsule by mouth daily, Disp: , Rfl:     ibuprofen (MOTRIN) 600 mg tablet, Take 1 tablet (600 mg total) by mouth every 6 (six) hours as needed for mild pain, moderate pain, fever or headaches (cramping), Disp: 60 tablet, Rfl: 3    montelukast (SINGULAIR) 10 mg tablet, Take 1 tablet (10 mg total) by mouth daily at bedtime, Disp: 90 tablet, Rfl: 1  Allergies   Allergen Reactions    Apple Anaphylaxis    Nuts Other (See Comments)     "itchy throat and difficulty swallowing "    Pineapple Anaphylaxis    Shellfish Allergy Other (See Comments)     "itchy throat and difficulty swallowing "    Shellfish-Derived Products Anaphylaxis    Other Hives and Rash     Black berries     Raspberry Hives and Rash    Penicillins Rash       OBJECTIVE    Vitals:   Vitals:    01/27/20 0842   BP: 126/82   Pulse: 94   Temp: 98 2 °F (36 8 °C)   SpO2: 98%   Weight: 96 2 kg (212 lb)   Height: 5' 2" (1 575 m)           Physical Exam   Constitutional: She appears well-developed and well-nourished  No distress  HENT:   Head: Normocephalic and atraumatic  Right Ear: External ear normal    Left Ear: External ear normal    Nose: Nose normal    Mouth/Throat: Oropharynx is clear and moist    Eyes: Conjunctivae are normal    Neck: No thyromegaly present  Cardiovascular: Normal rate and regular rhythm  Pulmonary/Chest: Effort normal and breath sounds normal  No respiratory distress  Abdominal: Soft  Bowel sounds are normal  She exhibits no distension  There is no tenderness  Musculoskeletal: She exhibits no edema  Lymphadenopathy:     She has no cervical adenopathy  Neurological: She is alert  Skin: Skin is warm and dry  Psychiatric: She has a normal mood and affect  Vitals reviewed                   DO Kathrine Mendoza [facility]   1/27/2020  10:31 AM

## 2020-02-27 DIAGNOSIS — R06.2 WHEEZING: ICD-10-CM

## 2020-02-27 RX ORDER — ALBUTEROL SULFATE 90 UG/1
2 AEROSOL, METERED RESPIRATORY (INHALATION) EVERY 6 HOURS PRN
Qty: 18 G | Refills: 0 | OUTPATIENT
Start: 2020-02-27

## 2020-02-27 RX ORDER — ALBUTEROL SULFATE 90 UG/1
2 AEROSOL, METERED RESPIRATORY (INHALATION) EVERY 6 HOURS PRN
Qty: 18 G | Refills: 1 | Status: SHIPPED | OUTPATIENT
Start: 2020-02-27 | End: 2020-04-06 | Stop reason: SDUPTHER

## 2020-02-27 RX ORDER — ALPRAZOLAM 0.25 MG/1
0.25 TABLET ORAL DAILY PRN
Qty: 30 TABLET | Refills: 0 | OUTPATIENT
Start: 2020-02-27

## 2020-02-27 NOTE — TELEPHONE ENCOUNTER
Please have pt make an appointment to discuss refill of her Xanax  Will send Albuterol refill  Thanks!

## 2020-03-20 RX ORDER — ALPRAZOLAM 0.25 MG/1
0.25 TABLET ORAL DAILY PRN
Qty: 30 TABLET | Refills: 0 | Status: SHIPPED | OUTPATIENT
Start: 2020-03-20 | End: 2020-03-23 | Stop reason: SDUPTHER

## 2020-03-23 RX ORDER — ALPRAZOLAM 0.25 MG/1
0.25 TABLET ORAL DAILY PRN
Qty: 30 TABLET | Refills: 0 | Status: SHIPPED | OUTPATIENT
Start: 2020-03-23 | End: 2020-04-27 | Stop reason: SDUPTHER

## 2020-04-06 ENCOUNTER — TELEMEDICINE (OUTPATIENT)
Dept: FAMILY MEDICINE CLINIC | Facility: CLINIC | Age: 30
End: 2020-04-06
Payer: COMMERCIAL

## 2020-04-06 DIAGNOSIS — F41.8 DEPRESSION WITH ANXIETY: Primary | ICD-10-CM

## 2020-04-06 DIAGNOSIS — R06.2 WHEEZING: ICD-10-CM

## 2020-04-06 PROCEDURE — 99214 OFFICE O/P EST MOD 30 MIN: CPT | Performed by: FAMILY MEDICINE

## 2020-04-06 RX ORDER — ALBUTEROL SULFATE 90 UG/1
2 AEROSOL, METERED RESPIRATORY (INHALATION) EVERY 6 HOURS PRN
Qty: 18 G | Refills: 1 | Status: SHIPPED | OUTPATIENT
Start: 2020-04-06 | End: 2020-04-22

## 2020-04-06 RX ORDER — HYDROXYZINE HYDROCHLORIDE 25 MG/1
25 TABLET, FILM COATED ORAL EVERY 6 HOURS PRN
Qty: 30 TABLET | Refills: 0 | Status: SHIPPED | OUTPATIENT
Start: 2020-04-06 | End: 2020-04-27 | Stop reason: SDUPTHER

## 2020-04-13 ENCOUNTER — TELEMEDICINE (OUTPATIENT)
Dept: FAMILY MEDICINE CLINIC | Facility: CLINIC | Age: 30
End: 2020-04-13
Payer: COMMERCIAL

## 2020-04-13 DIAGNOSIS — F51.05 INSOMNIA DUE TO OTHER MENTAL DISORDER: ICD-10-CM

## 2020-04-13 DIAGNOSIS — F41.8 DEPRESSION WITH ANXIETY: Primary | ICD-10-CM

## 2020-04-13 DIAGNOSIS — F99 INSOMNIA DUE TO OTHER MENTAL DISORDER: ICD-10-CM

## 2020-04-13 PROCEDURE — 99213 OFFICE O/P EST LOW 20 MIN: CPT | Performed by: FAMILY MEDICINE

## 2020-04-13 RX ORDER — SERTRALINE HYDROCHLORIDE 100 MG/1
100 TABLET, FILM COATED ORAL DAILY
Qty: 90 TABLET | Refills: 1 | Status: SHIPPED | OUTPATIENT
Start: 2020-04-13 | End: 2020-04-27 | Stop reason: SDUPTHER

## 2020-04-22 DIAGNOSIS — R06.2 WHEEZING: ICD-10-CM

## 2020-04-22 RX ORDER — ALBUTEROL SULFATE 90 UG/1
AEROSOL, METERED RESPIRATORY (INHALATION)
Qty: 18 INHALER | Refills: 1 | Status: SHIPPED | OUTPATIENT
Start: 2020-04-22 | End: 2020-06-25 | Stop reason: SDUPTHER

## 2020-04-27 ENCOUNTER — TELEMEDICINE (OUTPATIENT)
Dept: FAMILY MEDICINE CLINIC | Facility: CLINIC | Age: 30
End: 2020-04-27
Payer: COMMERCIAL

## 2020-04-27 DIAGNOSIS — F51.05 INSOMNIA DUE TO OTHER MENTAL DISORDER: ICD-10-CM

## 2020-04-27 DIAGNOSIS — F41.8 DEPRESSION WITH ANXIETY: Primary | ICD-10-CM

## 2020-04-27 DIAGNOSIS — F99 INSOMNIA DUE TO OTHER MENTAL DISORDER: ICD-10-CM

## 2020-04-27 DIAGNOSIS — F41.8 DEPRESSION WITH ANXIETY: ICD-10-CM

## 2020-04-27 PROCEDURE — 99213 OFFICE O/P EST LOW 20 MIN: CPT | Performed by: FAMILY MEDICINE

## 2020-04-27 RX ORDER — ALPRAZOLAM 0.25 MG/1
0.25 TABLET ORAL DAILY PRN
Qty: 30 TABLET | Refills: 0 | Status: SHIPPED | OUTPATIENT
Start: 2020-04-27 | End: 2020-05-23 | Stop reason: SDUPTHER

## 2020-04-27 RX ORDER — HYDROXYZINE HYDROCHLORIDE 10 MG/1
10 TABLET, FILM COATED ORAL EVERY 6 HOURS PRN
Qty: 30 TABLET | Refills: 2 | Status: SHIPPED | OUTPATIENT
Start: 2020-04-27 | End: 2020-05-31 | Stop reason: SDUPTHER

## 2020-04-27 RX ORDER — SERTRALINE HYDROCHLORIDE 100 MG/1
100 TABLET, FILM COATED ORAL DAILY
Qty: 90 TABLET | Refills: 1 | Status: SHIPPED | OUTPATIENT
Start: 2020-04-27 | End: 2020-07-27 | Stop reason: SDUPTHER

## 2020-05-23 DIAGNOSIS — R06.2 WHEEZING: ICD-10-CM

## 2020-05-26 RX ORDER — ALPRAZOLAM 0.25 MG/1
0.25 TABLET ORAL DAILY PRN
Qty: 30 TABLET | Refills: 0 | Status: SHIPPED | OUTPATIENT
Start: 2020-05-26 | End: 2020-06-25 | Stop reason: SDUPTHER

## 2020-05-26 RX ORDER — MONTELUKAST SODIUM 10 MG/1
10 TABLET ORAL
Qty: 90 TABLET | Refills: 1 | Status: SHIPPED | OUTPATIENT
Start: 2020-05-26 | End: 2020-08-17 | Stop reason: SDUPTHER

## 2020-05-31 DIAGNOSIS — F41.8 DEPRESSION WITH ANXIETY: ICD-10-CM

## 2020-06-01 RX ORDER — HYDROXYZINE HYDROCHLORIDE 10 MG/1
10 TABLET, FILM COATED ORAL EVERY 6 HOURS PRN
Qty: 30 TABLET | Refills: 0 | Status: SHIPPED | OUTPATIENT
Start: 2020-06-01 | End: 2020-06-25 | Stop reason: SDUPTHER

## 2020-06-25 DIAGNOSIS — R06.2 WHEEZING: ICD-10-CM

## 2020-06-25 DIAGNOSIS — F41.8 DEPRESSION WITH ANXIETY: ICD-10-CM

## 2020-06-25 RX ORDER — ALBUTEROL SULFATE 90 UG/1
2 AEROSOL, METERED RESPIRATORY (INHALATION) EVERY 4 HOURS PRN
Qty: 18 INHALER | Refills: 0 | Status: SHIPPED | OUTPATIENT
Start: 2020-06-25 | End: 2020-07-27 | Stop reason: SDUPTHER

## 2020-06-25 RX ORDER — HYDROXYZINE HYDROCHLORIDE 10 MG/1
10 TABLET, FILM COATED ORAL EVERY 6 HOURS PRN
Qty: 30 TABLET | Refills: 0 | Status: SHIPPED | OUTPATIENT
Start: 2020-06-25 | End: 2020-07-29 | Stop reason: SDUPTHER

## 2020-06-25 RX ORDER — ALPRAZOLAM 0.25 MG/1
0.25 TABLET ORAL DAILY PRN
Qty: 30 TABLET | Refills: 0 | Status: SHIPPED | OUTPATIENT
Start: 2020-06-25 | End: 2020-07-27 | Stop reason: SDUPTHER

## 2020-07-13 DIAGNOSIS — R21 RASH: Primary | ICD-10-CM

## 2020-07-13 RX ORDER — PERMETHRIN 50 MG/G
CREAM TOPICAL ONCE
Qty: 60 G | Refills: 0 | Status: SHIPPED | OUTPATIENT
Start: 2020-07-13 | End: 2020-07-13

## 2020-07-13 RX ORDER — PERMETHRIN 50 MG/G
CREAM TOPICAL ONCE
Qty: 60 G | Refills: 0 | Status: SHIPPED | OUTPATIENT
Start: 2020-07-13 | End: 2020-07-13 | Stop reason: ALTCHOICE

## 2020-07-27 DIAGNOSIS — F41.8 DEPRESSION WITH ANXIETY: ICD-10-CM

## 2020-07-27 DIAGNOSIS — R06.2 WHEEZING: ICD-10-CM

## 2020-07-27 RX ORDER — SERTRALINE HYDROCHLORIDE 100 MG/1
100 TABLET, FILM COATED ORAL DAILY
Qty: 90 TABLET | Refills: 1 | Status: SHIPPED | OUTPATIENT
Start: 2020-07-27 | End: 2020-12-15 | Stop reason: SDUPTHER

## 2020-07-27 RX ORDER — ALPRAZOLAM 0.25 MG/1
0.25 TABLET ORAL DAILY PRN
Qty: 30 TABLET | Refills: 0 | Status: SHIPPED | OUTPATIENT
Start: 2020-07-27 | End: 2020-08-31 | Stop reason: SDUPTHER

## 2020-07-27 RX ORDER — ALBUTEROL SULFATE 90 UG/1
2 AEROSOL, METERED RESPIRATORY (INHALATION) EVERY 4 HOURS PRN
Qty: 18 INHALER | Refills: 0 | Status: SHIPPED | OUTPATIENT
Start: 2020-07-27 | End: 2020-08-17 | Stop reason: SDUPTHER

## 2020-07-29 DIAGNOSIS — F41.8 DEPRESSION WITH ANXIETY: ICD-10-CM

## 2020-07-29 RX ORDER — HYDROXYZINE HYDROCHLORIDE 10 MG/1
10 TABLET, FILM COATED ORAL EVERY 6 HOURS PRN
Qty: 30 TABLET | Refills: 0 | Status: SHIPPED | OUTPATIENT
Start: 2020-07-29 | End: 2020-08-31 | Stop reason: SDUPTHER

## 2020-07-29 NOTE — TELEPHONE ENCOUNTER
7/29/2020 -spoke with pt and she called R/A and she was able to  the Zoloft today  But she did ask about the Atarax 10mg she would like for you to send in both Atarax 25mg and 10mg for at bedtime b/c she wakes up during the night and she said that she would rather take Atarax rather than Xanax to help with sleep  She said that she would like for you to give her a call    So I made a virtual appt for her with you for tomorrow 7/30/2020

## 2020-07-29 NOTE — TELEPHONE ENCOUNTER
7/29/2020 pt called and said that she was not able to  the Zoloft 100mg b/c it was too early  She stated that she took the Zoloft 100mg twice daily for a while b/c she thought that is what you told her to do  So she said that she has been out about 3 days now  She was wondering if there was anything else you could rx for now

## 2020-08-03 ENCOUNTER — TELEMEDICINE (OUTPATIENT)
Dept: FAMILY MEDICINE CLINIC | Facility: CLINIC | Age: 30
End: 2020-08-03
Payer: COMMERCIAL

## 2020-08-03 DIAGNOSIS — F51.05 INSOMNIA DUE TO OTHER MENTAL DISORDER: ICD-10-CM

## 2020-08-03 DIAGNOSIS — F99 INSOMNIA DUE TO OTHER MENTAL DISORDER: ICD-10-CM

## 2020-08-03 DIAGNOSIS — F41.8 DEPRESSION WITH ANXIETY: Primary | ICD-10-CM

## 2020-08-03 PROBLEM — R10.9 RIGHT FLANK PAIN: Status: RESOLVED | Noted: 2020-01-27 | Resolved: 2020-08-03

## 2020-08-03 PROCEDURE — 99213 OFFICE O/P EST LOW 20 MIN: CPT | Performed by: FAMILY MEDICINE

## 2020-08-03 PROCEDURE — 1036F TOBACCO NON-USER: CPT | Performed by: FAMILY MEDICINE

## 2020-08-03 NOTE — PROGRESS NOTES
Virtual Regular Visit      Assessment/Plan:    Problem List Items Addressed This Visit        Other    Depression with anxiety - Primary    Insomnia due to other mental disorder        Reviewed options for management including trial of alternative SSRI  Also discussed that at this point, I think she would benefit from evaluation from a psychiatrist (Dr Sergio Bloch office)  Pt states she was previously seeing a provider, and will call to schedule a follow up  Continue current regimen at this time  Reason for visit is   Chief Complaint   Patient presents with    Anxiety    Virtual Regular Visit        Encounter provider Shira Quiroz DO    Provider located at [place] [address]  [address] [place] [id number]      Recent Visits  No visits were found meeting these conditions  Showing recent visits within past 7 days and meeting all other requirements     Today's Visits  Date Type Provider Dept   08/03/20 Telemedicine Zoe Rivers DO [address] today's visits and meeting all other requirements     Future Appointments  No visits were found meeting these conditions  Showing future appointments within next 150 days and meeting all other requirements        The patient was identified by name and date of birth  Kisha Nava was informed that this is a telemedicine visit and that the visit is being conducted through [address] [place] [address] and patient was informed that this is not a secure, HIPAA-complaint platform  She agrees to proceed     My office door was closed  No one else was in the room  She acknowledged consent and understanding of privacy and security of the video platform  The patient has agreed to participate and understands they can discontinue the visit at any time  Patient is aware this is a billable service  Riaz King is a 27 y o  female who presents to discuss medications      HPI     Ran out of Zoloft for a few days, and was "not ok"  Started taking it again, and feels like it is "not doing what it is supposed to be doing"  Feels like her anxiety is at an all time high  Only takes Xanax at night, unless she is having a really bad day  Uses Hydroxyzine if she wakes up in the middle of the night  She just wants to be home, and doesn't want to go out at all  Feels safer at home  Doesn't want to do much  Past Medical History:   Diagnosis Date    Anemia     Anemia during pregnancy   Anxiety     Asthma     Cellulitis of abdominal wall 10/1/2019    Depression     Postpartum depression   Kidney infection     Kidney stone     Miscarriage     MRSA infection     Postpartum depression 10/1/2019    Preeclampsia 9/10/2019    Preeclampsia in postpartum period 2019    Rh incompatibility     Right flank pain 2020    Urinary tract infection     Varicella     Pt said she had chicken pox twice when she was younger         Past Surgical History:   Procedure Laterality Date    ADENOIDECTOMY       SECTION      DILATION AND CURETTAGE OF UTERUS      AL  DELIVERY ONLY N/A 9/3/2019    Procedure:  SECTION () REPEAT;  Surgeon: Derrick Mckeon DO;  [facility];  Service: Obstetrics    AL LIGATION,FALLOPIAN TUBE W/ Bilateral 9/3/2019    Procedure: LIGATION/COAGULATION TUBAL;  Surgeon: Derrick Mckeon DO;  [facility];  Service: Obstetrics    TONSILLECTOMY         Current Outpatient Medications   Medication Sig Dispense Refill    albuterol (PROVENTIL HFA,VENTOLIN HFA) 90 mcg/act inhaler Inhale 2 puffs every 4 (four) hours as needed for wheezing or shortness of breath 18 Inhaler 0    ALPRAZolam (XANAX) 0 25 mg tablet Take 1 tablet (0 25 mg total) by mouth daily as needed for anxiety 30 tablet 0    b complex vitamins capsule Take 1 capsule by mouth daily      hydrOXYzine HCL (ATARAX) 10 mg tablet Take 1 tablet (10 mg total) by mouth every 6 (six) hours as needed for anxiety (insomnia) 30 tablet 0    ibuprofen (MOTRIN) 600 mg tablet Take 1 tablet (600 mg total) by mouth every 6 (six) hours as needed for mild pain, moderate pain, fever or headaches (cramping) 60 tablet 3    montelukast (SINGULAIR) 10 mg tablet Take 1 tablet (10 mg total) by mouth daily at bedtime 90 tablet 1    sertraline (ZOLOFT) 100 mg tablet Take 1 tablet (100 mg total) by mouth daily 90 tablet 1     No current facility-administered medications for this visit  Allergies   Allergen Reactions    Apple Anaphylaxis    Nuts Other (See Comments)     "itchy throat and difficulty swallowing "    Pineapple Anaphylaxis    Shellfish Allergy Other (See Comments)     "itchy throat and difficulty swallowing "    Shellfish-Derived Products Anaphylaxis    Other Hives and Rash     Black berries     Raspberry Hives and Rash    Penicillins Rash       Review of Systems   Psychiatric/Behavioral: Positive for self-injury  The patient is nervous/anxious  Video Exam    There were no vitals filed for this visit  Physical Exam   Constitutional: No distress  HENT:   Head: Normocephalic and atraumatic  Pulmonary/Chest: Effort normal    Abdominal: Normal appearance  Neurological: She is alert  Psychiatric: Mood normal    Nursing note and vitals reviewed  I spent 8 minutes directly with the patient during this visit      VIRTUAL VISIT DISCLAIMER    Colin Rohini acknowledges that she has consented to an online visit or consultation  She understands that the online visit is based solely on information provided by her, and that, in the absence of a face-to-face physical evaluation by the physician, the diagnosis she receives is both limited and provisional in terms of accuracy and completeness  This is not intended to replace a full medical face-to-face evaluation by the physician  Colin Nova understands and accepts these terms

## 2020-08-04 ENCOUNTER — TELEPHONE (OUTPATIENT)
Dept: FAMILY MEDICINE CLINIC | Facility: CLINIC | Age: 30
End: 2020-08-04

## 2020-08-04 DIAGNOSIS — F41.8 DEPRESSION WITH ANXIETY: Primary | ICD-10-CM

## 2020-08-04 DIAGNOSIS — R06.2 WHEEZING: ICD-10-CM

## 2020-08-04 RX ORDER — ALBUTEROL SULFATE 2.5 MG/3ML
2.5 SOLUTION RESPIRATORY (INHALATION) EVERY 6 HOURS PRN
Qty: 60 VIAL | Refills: 1 | Status: SHIPPED | OUTPATIENT
Start: 2020-08-04

## 2020-08-04 NOTE — TELEPHONE ENCOUNTER
Patient called and asked for her Albuterol for nebulizer sent to [facility] in [place]  She only uses it once a day on days like today

## 2020-08-17 DIAGNOSIS — R06.2 WHEEZING: ICD-10-CM

## 2020-08-17 RX ORDER — ALBUTEROL SULFATE 90 UG/1
2 AEROSOL, METERED RESPIRATORY (INHALATION) EVERY 4 HOURS PRN
Qty: 18 INHALER | Refills: 0 | Status: SHIPPED | OUTPATIENT
Start: 2020-08-17 | End: 2020-12-15 | Stop reason: SDUPTHER

## 2020-08-17 RX ORDER — MONTELUKAST SODIUM 10 MG/1
10 TABLET ORAL
Qty: 90 TABLET | Refills: 1 | Status: SHIPPED | OUTPATIENT
Start: 2020-08-17 | End: 2021-01-18

## 2020-08-31 DIAGNOSIS — F41.8 DEPRESSION WITH ANXIETY: ICD-10-CM

## 2020-08-31 RX ORDER — HYDROXYZINE HYDROCHLORIDE 10 MG/1
10 TABLET, FILM COATED ORAL EVERY 6 HOURS PRN
Qty: 30 TABLET | Refills: 0 | Status: SHIPPED | OUTPATIENT
Start: 2020-08-31 | End: 2020-10-02 | Stop reason: SDUPTHER

## 2020-08-31 RX ORDER — ALPRAZOLAM 0.25 MG/1
0.25 TABLET ORAL DAILY PRN
Qty: 30 TABLET | Refills: 0 | Status: SHIPPED | OUTPATIENT
Start: 2020-08-31 | End: 2020-10-02 | Stop reason: SDUPTHER

## 2020-10-02 DIAGNOSIS — F41.8 DEPRESSION WITH ANXIETY: ICD-10-CM

## 2020-10-02 RX ORDER — HYDROXYZINE HYDROCHLORIDE 10 MG/1
10 TABLET, FILM COATED ORAL EVERY 6 HOURS PRN
Qty: 30 TABLET | Refills: 0 | Status: SHIPPED | OUTPATIENT
Start: 2020-10-02 | End: 2020-11-04 | Stop reason: SDUPTHER

## 2020-10-02 RX ORDER — ALPRAZOLAM 0.25 MG/1
0.25 TABLET ORAL DAILY PRN
Qty: 30 TABLET | Refills: 0 | Status: SHIPPED | OUTPATIENT
Start: 2020-10-02 | End: 2020-11-04 | Stop reason: SDUPTHER

## 2020-10-05 ENCOUNTER — TELEMEDICINE (OUTPATIENT)
Dept: FAMILY MEDICINE CLINIC | Facility: CLINIC | Age: 30
End: 2020-10-05
Payer: COMMERCIAL

## 2020-10-05 DIAGNOSIS — R19.7 DIARRHEA, UNSPECIFIED TYPE: Primary | ICD-10-CM

## 2020-10-05 PROCEDURE — 99214 OFFICE O/P EST MOD 30 MIN: CPT | Performed by: FAMILY MEDICINE

## 2020-10-05 PROCEDURE — 1036F TOBACCO NON-USER: CPT | Performed by: FAMILY MEDICINE

## 2020-11-04 DIAGNOSIS — F41.8 DEPRESSION WITH ANXIETY: ICD-10-CM

## 2020-11-04 RX ORDER — HYDROXYZINE HYDROCHLORIDE 10 MG/1
10 TABLET, FILM COATED ORAL EVERY 6 HOURS PRN
Qty: 30 TABLET | Refills: 0 | Status: SHIPPED | OUTPATIENT
Start: 2020-11-04 | End: 2020-12-15 | Stop reason: SDUPTHER

## 2020-11-04 RX ORDER — ALPRAZOLAM 0.25 MG/1
0.25 TABLET ORAL DAILY PRN
Qty: 30 TABLET | Refills: 0 | Status: SHIPPED | OUTPATIENT
Start: 2020-11-04 | End: 2020-12-15 | Stop reason: SDUPTHER

## 2020-12-15 DIAGNOSIS — F41.8 DEPRESSION WITH ANXIETY: ICD-10-CM

## 2020-12-15 DIAGNOSIS — R06.2 WHEEZING: ICD-10-CM

## 2020-12-16 RX ORDER — ALBUTEROL SULFATE 90 UG/1
2 AEROSOL, METERED RESPIRATORY (INHALATION) EVERY 4 HOURS PRN
Qty: 18 INHALER | Refills: 1 | Status: SHIPPED | OUTPATIENT
Start: 2020-12-16 | End: 2021-01-15 | Stop reason: SDUPTHER

## 2020-12-16 RX ORDER — SERTRALINE HYDROCHLORIDE 100 MG/1
100 TABLET, FILM COATED ORAL DAILY
Qty: 90 TABLET | Refills: 1 | Status: SHIPPED | OUTPATIENT
Start: 2020-12-16 | End: 2021-07-20

## 2020-12-16 RX ORDER — ALPRAZOLAM 0.25 MG/1
0.25 TABLET ORAL DAILY PRN
Qty: 30 TABLET | Refills: 0 | Status: SHIPPED | OUTPATIENT
Start: 2020-12-16 | End: 2021-01-15 | Stop reason: SDUPTHER

## 2020-12-16 RX ORDER — HYDROXYZINE HYDROCHLORIDE 10 MG/1
10 TABLET, FILM COATED ORAL EVERY 6 HOURS PRN
Qty: 30 TABLET | Refills: 2 | Status: SHIPPED | OUTPATIENT
Start: 2020-12-16 | End: 2021-01-15 | Stop reason: SDUPTHER

## 2021-01-15 DIAGNOSIS — R06.2 WHEEZING: ICD-10-CM

## 2021-01-15 DIAGNOSIS — F41.8 DEPRESSION WITH ANXIETY: ICD-10-CM

## 2021-01-15 RX ORDER — HYDROXYZINE HYDROCHLORIDE 10 MG/1
10 TABLET, FILM COATED ORAL EVERY 6 HOURS PRN
Qty: 30 TABLET | Refills: 0 | Status: SHIPPED | OUTPATIENT
Start: 2021-01-15 | End: 2021-02-26 | Stop reason: SDUPTHER

## 2021-01-15 RX ORDER — ALBUTEROL SULFATE 90 UG/1
2 AEROSOL, METERED RESPIRATORY (INHALATION) EVERY 4 HOURS PRN
Qty: 18 INHALER | Refills: 0 | Status: SHIPPED | OUTPATIENT
Start: 2021-01-15 | End: 2021-02-26 | Stop reason: SDUPTHER

## 2021-01-15 RX ORDER — ALPRAZOLAM 0.25 MG/1
0.25 TABLET ORAL DAILY PRN
Qty: 30 TABLET | Refills: 0 | Status: SHIPPED | OUTPATIENT
Start: 2021-01-15 | End: 2021-02-24

## 2021-01-15 NOTE — TELEPHONE ENCOUNTER
Will send one month refill; however, pt needs to schedule office visit as she is due to update her controlled substances contract  Also if she is using her albuterol this frequently we need to re-evaluate her breathing  Thanks!

## 2021-01-17 DIAGNOSIS — R06.2 WHEEZING: ICD-10-CM

## 2021-01-18 RX ORDER — MONTELUKAST SODIUM 10 MG/1
TABLET ORAL
Qty: 90 TABLET | Refills: 1 | Status: SHIPPED | OUTPATIENT
Start: 2021-01-18 | End: 2021-09-01

## 2021-02-22 DIAGNOSIS — F41.8 DEPRESSION WITH ANXIETY: ICD-10-CM

## 2021-02-22 DIAGNOSIS — R06.2 WHEEZING: ICD-10-CM

## 2021-02-22 NOTE — TELEPHONE ENCOUNTER
Received refill request -- pt needs to make an appointment to come in to the office  We need to update her controlled substance agreement as it is >3year old  She also seems to be using her albuterol inhaler much more frequently than she should be needing it, so this needs to be addressed  Please have her schedule an appointment, and then I will send in refills  Thanks!

## 2021-02-22 NOTE — TELEPHONE ENCOUNTER
Received refill request -- pt needs to make an appointment to come in to the office  We need to update her controlled substance agreement as it is >3year old  She also seems to be using her albuterol inhaler much more frequently than she should be needing it, so this needs to be addressed  Please have her schedule an appointment, and then I will send in refills  Thanks! Received refill request -- pt needs to make an appointment to come in to the office  We need to update her controlled substance agreement as it is >3year old  She also seems to be using her albuterol inhaler much more frequently than she should be needing it, so this needs to be addressed  Please have her schedule an appointment, and then I will send in refills  Thanks!

## 2021-02-24 RX ORDER — ALPRAZOLAM 0.25 MG/1
TABLET ORAL
Qty: 30 TABLET | Refills: 0 | Status: SHIPPED | OUTPATIENT
Start: 2021-02-24 | End: 2021-11-08

## 2021-02-26 DIAGNOSIS — R06.2 WHEEZING: ICD-10-CM

## 2021-02-26 DIAGNOSIS — F41.8 DEPRESSION WITH ANXIETY: ICD-10-CM

## 2021-02-26 RX ORDER — ALPRAZOLAM 0.25 MG/1
0.25 TABLET ORAL DAILY PRN
Qty: 30 TABLET | Refills: 0 | OUTPATIENT
Start: 2021-02-26

## 2021-02-26 RX ORDER — ALBUTEROL SULFATE 90 UG/1
2 AEROSOL, METERED RESPIRATORY (INHALATION) EVERY 4 HOURS PRN
Qty: 18 INHALER | Refills: 0 | OUTPATIENT
Start: 2021-02-26

## 2021-02-26 RX ORDER — HYDROXYZINE HYDROCHLORIDE 10 MG/1
10 TABLET, FILM COATED ORAL EVERY 6 HOURS PRN
Qty: 30 TABLET | Refills: 0 | OUTPATIENT
Start: 2021-02-26

## 2021-02-26 RX ORDER — HYDROXYZINE HYDROCHLORIDE 10 MG/1
10 TABLET, FILM COATED ORAL EVERY 6 HOURS PRN
Qty: 30 TABLET | Refills: 0 | Status: SHIPPED | OUTPATIENT
Start: 2021-02-26 | End: 2021-04-09 | Stop reason: SDUPTHER

## 2021-02-26 RX ORDER — MONTELUKAST SODIUM 10 MG/1
10 TABLET ORAL
Qty: 90 TABLET | Refills: 1 | OUTPATIENT
Start: 2021-02-26

## 2021-02-26 RX ORDER — ALBUTEROL SULFATE 90 UG/1
2 AEROSOL, METERED RESPIRATORY (INHALATION) EVERY 4 HOURS PRN
Qty: 18 INHALER | Refills: 0 | Status: SHIPPED | OUTPATIENT
Start: 2021-02-26 | End: 2021-04-09 | Stop reason: SDUPTHER

## 2021-02-26 NOTE — TELEPHONE ENCOUNTER
I sent in a refill for Hydroxyzine, Benzo and Albuterol  Singulair is too early  Can we please send her a letter that she needs a visit for further refills since we cannot reach her by phone  Thanks!

## 2021-04-06 ENCOUNTER — HOSPITAL ENCOUNTER (EMERGENCY)
Facility: HOSPITAL | Age: 31
Discharge: HOME | End: 2021-04-06
Attending: EMERGENCY MEDICINE
Payer: COMMERCIAL

## 2021-04-06 ENCOUNTER — APPOINTMENT (EMERGENCY)
Dept: RADIOLOGY | Facility: HOSPITAL | Age: 31
End: 2021-04-06
Payer: COMMERCIAL

## 2021-04-06 VITALS
HEART RATE: 91 BPM | WEIGHT: 220 LBS | DIASTOLIC BLOOD PRESSURE: 77 MMHG | OXYGEN SATURATION: 95 % | RESPIRATION RATE: 18 BRPM | TEMPERATURE: 98.4 F | BODY MASS INDEX: 40.24 KG/M2 | SYSTOLIC BLOOD PRESSURE: 137 MMHG

## 2021-04-06 DIAGNOSIS — S63.501A SPRAIN OF RIGHT WRIST, INITIAL ENCOUNTER: Primary | ICD-10-CM

## 2021-04-06 DIAGNOSIS — S60.211A CONTUSION OF RIGHT WRIST, INITIAL ENCOUNTER: ICD-10-CM

## 2021-04-06 PROCEDURE — 99282 EMERGENCY DEPT VISIT SF MDM: CPT | Performed by: EMERGENCY MEDICINE

## 2021-04-06 PROCEDURE — 73110 X-RAY EXAM OF WRIST: CPT

## 2021-04-06 PROCEDURE — 73130 X-RAY EXAM OF HAND: CPT

## 2021-04-06 PROCEDURE — 99283 EMERGENCY DEPT VISIT LOW MDM: CPT

## 2021-04-06 RX ORDER — IBUPROFEN 600 MG/1
600 TABLET ORAL ONCE
Status: COMPLETED | OUTPATIENT
Start: 2021-04-06 | End: 2021-04-06

## 2021-04-06 RX ADMIN — IBUPROFEN 600 MG: 600 TABLET ORAL at 12:32

## 2021-04-06 NOTE — ED PROVIDER NOTES
History  Chief Complaint   Patient presents with    Arm Pain     c/o right forearm pain after attempting cartwheel last night     HPI    Prior to Admission Medications   Prescriptions Last Dose Informant Patient Reported? Taking? ALPRAZolam (XANAX) 0 25 mg tablet   No No   Sig: take 1 tablet by mouth daily if needed for anxiety   albuterol (2 5 mg/3 mL) 0 083 % nebulizer solution   No No   Sig: Take 1 vial (2 5 mg total) by nebulization every 6 (six) hours as needed for wheezing or shortness of breath   albuterol (PROVENTIL HFA,VENTOLIN HFA) 90 mcg/act inhaler   No No   Sig: Inhale 2 puffs every 4 (four) hours as needed for wheezing or shortness of breath   b complex vitamins capsule  Self Yes No   Sig: Take 1 capsule by mouth daily   hydrOXYzine HCL (ATARAX) 10 mg tablet   No No   Sig: Take 1 tablet (10 mg total) by mouth every 6 (six) hours as needed for anxiety (insomnia)   ibuprofen (MOTRIN) 600 mg tablet  Self No No   Sig: Take 1 tablet (600 mg total) by mouth every 6 (six) hours as needed for mild pain, moderate pain, fever or headaches (cramping)   montelukast (SINGULAIR) 10 mg tablet   No No   Sig: take 1 tablet by mouth at bedtime   sertraline (ZOLOFT) 100 mg tablet   No No   Sig: Take 1 tablet (100 mg total) by mouth daily      Facility-Administered Medications: None       Past Medical History:   Diagnosis Date    Anemia     Anemia during pregnancy   Anxiety     Asthma     Cellulitis of abdominal wall 10/1/2019    Depression     Postpartum depression   Kidney infection     Kidney stone     Miscarriage     MRSA infection     Postpartum depression 10/1/2019    Preeclampsia 9/10/2019    Preeclampsia in postpartum period 2019    Rh incompatibility     Right flank pain 2020    Urinary tract infection     Varicella     Pt said she had chicken pox twice when she was younger         Past Surgical History:   Procedure Laterality Date    ADENOIDECTOMY       SECTION      DILATION AND CURETTAGE OF UTERUS  2014    [place]  DELIVERY ONLY N/A 9/3/2019    Procedure:  SECTION () REPEAT;  Surgeon: Alejandra Izaguirre DO;  [facility] ;  Service: Obstetrics    MN LIGATION,FALLOPIAN TUBE W/ Bilateral 9/3/2019    Procedure: LIGATION/COAGULATION TUBAL;  Surgeon: Alejandra Izaguirre DO;  [facility] ;  Service: Obstetrics    TONSILLECTOMY         Family History   Problem Relation Age of Onset    Diverticulitis Mother     Heart disease Father     Heart attack Father     Stroke Father     No Known Problems Daughter     No Known Problems Son     Diverticulitis Maternal Grandmother     Diabetes Maternal Grandfather     Aneurysm Maternal Grandfather     No Known Problems Paternal Grandmother     Heart disease Paternal Grandfather     Heart attack Paternal Grandfather     Heart disease Family     Crohn's disease Paternal Aunt      I have reviewed and agree with the history as documented  E-Cigarette/Vaping     E-Cigarette/Vaping Substances     Social History     Tobacco Use    Smoking status: Never Smoker    Smokeless tobacco: Never Used   Substance Use Topics    Alcohol use: Yes     Alcohol/week: 2 0 - 3 0 standard drinks     Types: 2 - 3 Cans of beer per week     Frequency: 4 or more times a week    Drug use: Yes     Types: Marijuana       Review of Systems    Physical Exam  Physical Exam  Vitals signs and nursing note reviewed  Constitutional:       General: She is not in acute distress  Appearance: She is well-developed  HENT:      Head: Normocephalic and atraumatic  Eyes:      Conjunctiva/sclera: Conjunctivae normal       Pupils: Pupils are equal, round, and reactive to light  Neck:      Musculoskeletal: Normal range of motion  Trachea: No tracheal deviation  Cardiovascular:      Rate and Rhythm: Normal rate and regular rhythm  Pulses:           Radial pulses are 2+ on the right side     Pulmonary:      Effort: Pulmonary effort is normal  No respiratory distress  Musculoskeletal:      Right wrist: She exhibits decreased range of motion (Mild, due to pain in the wrist), tenderness (Ulnar side) and swelling (mild, ulnar side)  She exhibits no effusion, no crepitus and no deformity  Right forearm: She exhibits tenderness, bony tenderness (Primarily ulnar side, very mild to radial side) and swelling  She exhibits no deformity and no laceration  Arms:       Right hand: She exhibits decreased range of motion (mild, 4th & 5th fingers due to pain) and tenderness (along 5th MC and 5th finger)  She exhibits normal capillary refill, no deformity and no swelling  Comments: NVI distally   Skin:     General: Skin is warm and dry  Neurological:      Mental Status: She is alert and oriented to person, place, and time  GCS: GCS eye subscore is 4  GCS verbal subscore is 5  GCS motor subscore is 6  Psychiatric:         Behavior: Behavior normal          Vital Signs  ED Triage Vitals   Temp Pulse Resp BP SpO2   -- -- -- -- --      Temp src Heart Rate Source Patient Position - Orthostatic VS BP Location FiO2 (%)   -- -- -- -- --      Pain Score       --           There were no vitals filed for this visit  Visual Acuity      ED Medications  Medications   ibuprofen (MOTRIN) tablet 600 mg (has no administration in time range)       Diagnostic Studies  Results Reviewed     None                 XR wrist 3+ views RIGHT    (Results Pending)   XR hand 3+ views RIGHT    (Results Pending)              Procedures  Procedures         ED Course                                           MDM  Number of Diagnoses or Management Options  Contusion of right wrist, initial encounter: new and requires workup  Sprain of right wrist, initial encounter: new and requires workup  Diagnosis management comments:  This is a 32 year right-hand-dominant female who presents here today with an injury to her right wrist   She states last night she was having "girls night" with her friends, was trying to do a cartwheel and was unsuccessful  She states she fell  She does endorse mild pain in multiple places from falling, but states worst pain is in her right wrist on the ulnar side which she is having difficulties moving  She has not taken or done anything for pain  She was still hurting this morning, prompting her to come in for evaluation  Review of systems:  Otherwise negative unless stated as above    She has pain along the distal ulna extending out through the fifth finger, mildly to the radial side  She does have swelling and ecchymoses to the area  There is no deformity  She is neurovascular intact distally  Exam is otherwise unremarkable  We will get x-rays to evaluate for underlying bony injury  X-rays were reviewed by myself, and show no acute bony abnormalities  I discussed with the patient findings, treatment at home, follow-up, and indications for return, and she expresses understanding with this plan         Amount and/or Complexity of Data Reviewed  Tests in the radiology section of CPT®: ordered and reviewed  Independent visualization of images, tracings, or specimens: yes        Disposition  Final diagnoses:   Sprain of right wrist, initial encounter   Contusion of right wrist, initial encounter     Time reflects when diagnosis was documented in both MDM as applicable and the Disposition within this note     Time User Action Codes Description Comment    4/6/2021  1:14 PM Osei Ledesma Add [S60 212A] Contusion of left wrist, initial encounter     4/6/2021  1:14 PM Osei Ledesma Add [S63 501A] Sprain of right wrist, initial encounter     4/6/2021  1:14 PM Osei Ledesma Modify [R44 082R] Sprain of right wrist, initial encounter     4/6/2021  1:14 PM Osei Ledesma Remove [S60 212A] Contusion of left wrist, initial encounter     4/6/2021  1:14 PM Callie Mimi Leroy Add [D42 [id number]] Contusion of right wrist, initial encounter       ED Disposition     ED Disposition Condition Date/Time Comment    Discharge Good Tue Apr 6, 2021  1:13 PM Ger Spicer discharge to home/self care  Follow-up Information     Follow up With Specialties Details Why Contact Info Additional Information    Irina Mcnamara DO Family Medicine Schedule an appointment as soon as possible for a visit in 3 days As needed, to follow up on your wrist [address] [id number] [facility] Orthopedic Surgery Schedule an appointment as soon as possible for a visit in 1 week As needed, for no improvement of your pain [address] [facility] [address], [address], [place], [place], [address] [phone]          Patient's Medications   Discharge Prescriptions    No medications on file     No discharge procedures on file      PDMP Review       Value Time User    PDMP Reviewed  Yes 2/24/2021  9:43 AM Irina Mcnamara DO          ED Provider  Electronically Signed by           Mateus Miranda MD  04/06/21 [id number]

## 2021-04-06 NOTE — DISCHARGE INSTRUCTIONS
Wear the wrist to protect arm  Given elevated at rest   Apply ice to help with pain and swelling  Take ibuprofen (Motrin, Advil) or acetaminophen (Tylenol) as needed for pain, as per the instructions  Follow-up with your care doctor to make sure that your wrist is doing better  If you are not having any improvement in about a week, follow-up with Orthopedics for further evaluation

## 2021-04-08 DIAGNOSIS — R06.2 WHEEZING: ICD-10-CM

## 2021-04-08 DIAGNOSIS — F41.8 DEPRESSION WITH ANXIETY: ICD-10-CM

## 2021-04-08 RX ORDER — ALPRAZOLAM 0.25 MG/1
0.25 TABLET ORAL DAILY PRN
Qty: 30 TABLET | Refills: 0 | OUTPATIENT
Start: 2021-04-08

## 2021-04-08 RX ORDER — HYDROXYZINE HYDROCHLORIDE 10 MG/1
10 TABLET, FILM COATED ORAL EVERY 6 HOURS PRN
Qty: 30 TABLET | Refills: 0 | OUTPATIENT
Start: 2021-04-08

## 2021-04-08 RX ORDER — ALBUTEROL SULFATE 90 UG/1
2 AEROSOL, METERED RESPIRATORY (INHALATION) EVERY 4 HOURS PRN
Qty: 18 INHALER | Refills: 0 | OUTPATIENT
Start: 2021-04-08

## 2021-04-08 NOTE — TELEPHONE ENCOUNTER
Any issues please call [phone] ('s phone - hers is not working)    Also patient seen in ER and having a lot of wrist pain - does have an virtual appointment in the morning but asked me to ask you if you could send in something for pain  Using tylenol and ibuprofen  And it is not working

## 2021-04-08 NOTE — TELEPHONE ENCOUNTER
Pt needs to be seen in the office for refills -- overdue for UDS + contract and has been requesting albuterol too frequently -- needs to be evaluated  THanks!

## 2021-04-08 NOTE — TELEPHONE ENCOUNTER
Pt needs an in office appointment -- due for UDS + contract, and has been requesting albuterol too frequently -- needs to be evaluated  Please have her schedule and then I will send in refills   THanks

## 2021-04-09 ENCOUNTER — TELEMEDICINE (OUTPATIENT)
Dept: FAMILY MEDICINE CLINIC | Facility: CLINIC | Age: 31
End: 2021-04-09
Payer: COMMERCIAL

## 2021-04-09 DIAGNOSIS — M25.531 WRIST PAIN, RIGHT: Primary | ICD-10-CM

## 2021-04-09 DIAGNOSIS — R06.2 WHEEZING: ICD-10-CM

## 2021-04-09 DIAGNOSIS — F41.8 DEPRESSION WITH ANXIETY: ICD-10-CM

## 2021-04-09 PROCEDURE — 1036F TOBACCO NON-USER: CPT | Performed by: FAMILY MEDICINE

## 2021-04-09 PROCEDURE — 99213 OFFICE O/P EST LOW 20 MIN: CPT | Performed by: FAMILY MEDICINE

## 2021-04-09 RX ORDER — HYDROXYZINE HYDROCHLORIDE 10 MG/1
10 TABLET, FILM COATED ORAL EVERY 6 HOURS PRN
Qty: 90 TABLET | Refills: 1 | Status: SHIPPED | OUTPATIENT
Start: 2021-04-09

## 2021-04-09 RX ORDER — ALBUTEROL SULFATE 90 UG/1
2 AEROSOL, METERED RESPIRATORY (INHALATION) EVERY 4 HOURS PRN
Qty: 18 INHALER | Refills: 0 | Status: SHIPPED | OUTPATIENT
Start: 2021-04-09 | End: 2021-10-01 | Stop reason: SDUPTHER

## 2021-04-09 NOTE — PROGRESS NOTES
Virtual Regular Visit      Assessment/Plan:    Problem List Items Addressed This Visit        Other    Depression with anxiety    Relevant Medications    hydrOXYzine HCL (ATARAX) 10 mg tablet    Wheezing    Relevant Medications    albuterol (PROVENTIL HFA,VENTOLIN HFA) 90 mcg/act inhaler      Other Visit Diagnoses     Wrist pain, right    -  Primary    Relevant Orders    Ambulatory referral to Orthopedic Surgery    Ambulatory referral to Occupational Therapy        Wrist pain -- will refer to OT and Ortho Hand for further evaluation  Continue supportive care  Refill Hydroxyzine, Albuterol sent  Encouraged pt to schedule appointment to discuss breathing/wheezing symptoms, as she is using her albuterol far more than recommended  Reviewed that pt is due to renew her UDS + contract for Xanax -- pt states she stopped taking it a few weeks ago, and is not sure she would like to start it again  Reason for visit is   Chief Complaint   Patient presents with    Wrist Pain     right hand dominant, fell on it a few days ago     Virtual Regular Visit        Encounter provider Shlomo Rudolph DO    Provider located at [address]  [address] [place] [id number]      Recent Visits  No visits were found meeting these conditions  Showing recent visits within past 7 days and meeting all other requirements     Today's Visits  Date Type Provider Dept   04/09/21 Telemedicine Zoe Rivers DO [facility]   Showing today's visits and meeting all other requirements     Future Appointments  No visits were found meeting these conditions  Showing future appointments within next 150 days and meeting all other requirements        The patient was identified by name and date of birth   Ahsan Lee was informed that this is a telemedicine visit and that the visit is being conducted through [facility] and patient was informed that this is not a secure, HIPAA-compliant platform  She agrees to proceed     My office door was closed  No one else was in the room  She acknowledged consent and understanding of privacy and security of the video platform  The patient has agreed to participate and understands they can discontinue the visit at any time  Patient is aware this is a billable service  Sergei Shultz is a 32 y o  female who presents due to wrist pain  HPI      ED: Attempted to do a cartwheel and fell the night prior   XR hand/wrist benign   Her hand is bruised along the ulnar/extensor surface of her wrist  Able to move her fingers   She is wearing a brace and taking Tylenol/Motrin without much relief   She was told to make an Orthopedic appointment, but then they told her to call here    Past Medical History:   Diagnosis Date    Anemia     Anemia during pregnancy   Anxiety     Asthma     Cellulitis of abdominal wall 10/1/2019    Depression     Postpartum depression   Kidney infection     Kidney stone     Miscarriage     MRSA infection     Postpartum depression 10/1/2019    Preeclampsia 9/10/2019    Preeclampsia in postpartum period 2019    Rh incompatibility     Right flank pain 2020    Urinary tract infection     Varicella     Pt said she had chicken pox twice when she was younger         Past Surgical History:   Procedure Laterality Date    ADENOIDECTOMY       SECTION      DILATION AND CURETTAGE OF UTERUS      CO  DELIVERY ONLY N/A 9/3/2019    Procedure:  SECTION () REPEAT;  Surgeon: Dave Arechiga DO;  [facility]: [facility];  Service: Obstetrics    CO LIGATION,FALLOPIAN TUBE W/ Bilateral 9/3/2019    Procedure: LIGATION/COAGULATION TUBAL;  Surgeon: Dave Arechiga DO;  Location: [facility] ;  Service: Obstetrics    TONSILLECTOMY         Current Outpatient Medications   Medication Sig Dispense Refill    albuterol (2 5 mg/3 mL) 0 083 % nebulizer solution Take 1 vial (2 5 mg total) by nebulization every 6 (six) hours as needed for wheezing or shortness of breath 60 vial 1    albuterol (PROVENTIL HFA,VENTOLIN HFA) 90 mcg/act inhaler Inhale 2 puffs every 4 (four) hours as needed for wheezing or shortness of breath 18 Inhaler 0    ALPRAZolam (XANAX) 0 25 mg tablet take 1 tablet by mouth daily if needed for anxiety 30 tablet 0    b complex vitamins capsule Take 1 capsule by mouth daily      hydrOXYzine HCL (ATARAX) 10 mg tablet Take 1 tablet (10 mg total) by mouth every 6 (six) hours as needed for anxiety (insomnia) 90 tablet 1    ibuprofen (MOTRIN) 600 mg tablet Take 1 tablet (600 mg total) by mouth every 6 (six) hours as needed for mild pain, moderate pain, fever or headaches (cramping) 60 tablet 3    montelukast (SINGULAIR) 10 mg tablet take 1 tablet by mouth at bedtime 90 tablet 1    sertraline (ZOLOFT) 100 mg tablet Take 1 tablet (100 mg total) by mouth daily 90 tablet 1     No current facility-administered medications for this visit  Allergies   Allergen Reactions    Apple - Food Allergy Anaphylaxis    Nuts - Food Allergy Other (See Comments)     "itchy throat and difficulty swallowing "    Pineapple - Food Allergy Anaphylaxis    Shellfish Allergy - Food Allergy Other (See Comments)     "itchy throat and difficulty swallowing "    Shellfish-Derived Products - Food Allergy Anaphylaxis    Other Hives and Rash     Black berries     Raspberry - Food Allergy Hives and Rash    Penicillins Rash       Review of Systems   Musculoskeletal: Positive for arthralgias  Skin: Positive for color change (ecchymosis)  Video Exam    There were no vitals filed for this visit  Physical Exam  Vitals signs and nursing note reviewed  Constitutional:       General: She is not in acute distress  Appearance: Normal appearance  HENT:      Head: Normocephalic and atraumatic  Pulmonary:      Effort: Pulmonary effort is normal  No respiratory distress  Skin:     Comments: R wrist in brace  When removed -- ecchymosis noted over ulnar and extensor surface of wrist   Able to move all digits    Neurological:      General: No focal deficit present  Mental Status: She is alert  Psychiatric:         Mood and Affect: Mood normal           I spent 8 minutes directly with the patient during this visit      Sallie Francois Subha acknowledges that she has consented to an online visit or consultation  She understands that the online visit is based solely on information provided by her, and that, in the absence of a face-to-face physical evaluation by the physician, the diagnosis she receives is both limited and provisional in terms of accuracy and completeness  This is not intended to replace a full medical face-to-face evaluation by the physician  Ash Glover understands and accepts these terms

## 2021-07-19 DIAGNOSIS — F41.8 DEPRESSION WITH ANXIETY: ICD-10-CM

## 2021-07-20 RX ORDER — SERTRALINE HYDROCHLORIDE 100 MG/1
TABLET, FILM COATED ORAL
Qty: 90 TABLET | Refills: 1 | Status: SHIPPED | OUTPATIENT
Start: 2021-07-20

## 2021-08-31 DIAGNOSIS — R06.2 WHEEZING: ICD-10-CM

## 2021-09-01 RX ORDER — MONTELUKAST SODIUM 10 MG/1
TABLET ORAL
Qty: 90 TABLET | Refills: 1 | Status: SHIPPED | OUTPATIENT
Start: 2021-09-01 | End: 2022-03-11

## 2021-10-01 DIAGNOSIS — R06.2 WHEEZING: ICD-10-CM

## 2021-10-01 RX ORDER — ALBUTEROL SULFATE 90 UG/1
2 AEROSOL, METERED RESPIRATORY (INHALATION) EVERY 4 HOURS PRN
Qty: 18 G | Refills: 1 | Status: SHIPPED | OUTPATIENT
Start: 2021-10-01 | End: 2022-03-11

## 2021-11-05 ENCOUNTER — TELEPHONE (OUTPATIENT)
Dept: FAMILY MEDICINE CLINIC | Facility: CLINIC | Age: 31
End: 2021-11-05

## 2021-11-05 PROCEDURE — U0005 INFEC AGEN DETEC AMPLI PROBE: HCPCS | Performed by: FAMILY MEDICINE

## 2021-11-05 PROCEDURE — U0003 INFECTIOUS AGENT DETECTION BY NUCLEIC ACID (DNA OR RNA); SEVERE ACUTE RESPIRATORY SYNDROME CORONAVIRUS 2 (SARS-COV-2) (CORONAVIRUS DISEASE [COVID-19]), AMPLIFIED PROBE TECHNIQUE, MAKING USE OF HIGH THROUGHPUT TECHNOLOGIES AS DESCRIBED BY CMS-2020-01-R: HCPCS | Performed by: FAMILY MEDICINE

## 2021-11-06 ENCOUNTER — TELEPHONE (OUTPATIENT)
Dept: OTHER | Facility: OTHER | Age: 31
End: 2021-11-06

## 2021-11-08 ENCOUNTER — TELEMEDICINE (OUTPATIENT)
Dept: FAMILY MEDICINE CLINIC | Facility: CLINIC | Age: 31
End: 2021-11-08
Payer: COMMERCIAL

## 2021-11-08 DIAGNOSIS — U07.1 COVID-19: Primary | ICD-10-CM

## 2021-11-08 PROBLEM — R06.2 WHEEZING: Status: RESOLVED | Noted: 2020-01-27 | Resolved: 2021-11-08

## 2021-11-08 PROBLEM — R19.7 DIARRHEA: Status: RESOLVED | Noted: 2020-10-05 | Resolved: 2021-11-08

## 2021-11-08 PROCEDURE — G2012 BRIEF CHECK IN BY MD/QHP: HCPCS | Performed by: FAMILY MEDICINE

## 2021-11-08 RX ORDER — ASCORBIC ACID 500 MG
500 TABLET ORAL DAILY
COMMUNITY

## 2021-11-09 ENCOUNTER — TELEMEDICINE (OUTPATIENT)
Dept: FAMILY MEDICINE CLINIC | Facility: CLINIC | Age: 31
End: 2021-11-09
Payer: COMMERCIAL

## 2021-11-09 DIAGNOSIS — U07.1 COVID-19: Primary | ICD-10-CM

## 2021-11-09 PROCEDURE — 99213 OFFICE O/P EST LOW 20 MIN: CPT | Performed by: FAMILY MEDICINE

## 2021-11-11 ENCOUNTER — TELEMEDICINE (OUTPATIENT)
Dept: FAMILY MEDICINE CLINIC | Facility: CLINIC | Age: 31
End: 2021-11-11
Payer: COMMERCIAL

## 2021-11-11 DIAGNOSIS — U07.1 COVID-19: Primary | ICD-10-CM

## 2021-11-11 PROCEDURE — G2012 BRIEF CHECK IN BY MD/QHP: HCPCS | Performed by: FAMILY MEDICINE

## 2022-03-11 DIAGNOSIS — R06.2 WHEEZING: ICD-10-CM

## 2022-03-11 RX ORDER — ALBUTEROL SULFATE 90 UG/1
AEROSOL, METERED RESPIRATORY (INHALATION)
Qty: 18 G | Refills: 1 | Status: SHIPPED | OUTPATIENT
Start: 2022-03-11

## 2022-03-11 RX ORDER — MONTELUKAST SODIUM 10 MG/1
TABLET ORAL
Qty: 90 TABLET | Refills: 1 | Status: SHIPPED | OUTPATIENT
Start: 2022-03-11

## 2022-07-05 ENCOUNTER — VBI (OUTPATIENT)
Dept: ADMINISTRATIVE | Facility: OTHER | Age: 32
End: 2022-07-05

## 2022-09-06 DIAGNOSIS — R06.2 WHEEZING: ICD-10-CM

## 2022-09-06 RX ORDER — MONTELUKAST SODIUM 10 MG/1
TABLET ORAL
Qty: 90 TABLET | Refills: 1 | Status: SHIPPED | OUTPATIENT
Start: 2022-09-06

## 2023-01-03 ENCOUNTER — APPOINTMENT (OUTPATIENT)
Dept: LAB | Facility: CLINIC | Age: 33
End: 2023-01-03

## 2023-01-03 ENCOUNTER — OFFICE VISIT (OUTPATIENT)
Dept: OBGYN CLINIC | Age: 33
End: 2023-01-03

## 2023-01-03 VITALS
SYSTOLIC BLOOD PRESSURE: 120 MMHG | DIASTOLIC BLOOD PRESSURE: 84 MMHG | WEIGHT: 218.8 LBS | BODY MASS INDEX: 40.27 KG/M2 | HEIGHT: 62 IN

## 2023-01-03 DIAGNOSIS — N93.9 ABNORMAL UTERINE BLEEDING (AUB): Primary | ICD-10-CM

## 2023-01-03 DIAGNOSIS — N93.9 ABNORMAL UTERINE BLEEDING (AUB): ICD-10-CM

## 2023-01-03 LAB
BASOPHILS # BLD AUTO: 0.03 THOUSANDS/ÂΜL (ref 0–0.1)
BASOPHILS NFR BLD AUTO: 0 % (ref 0–1)
EOSINOPHIL # BLD AUTO: 0.1 THOUSAND/ÂΜL (ref 0–0.61)
EOSINOPHIL NFR BLD AUTO: 1 % (ref 0–6)
ERYTHROCYTE [DISTWIDTH] IN BLOOD BY AUTOMATED COUNT: 14.1 % (ref 11.6–15.1)
HCT VFR BLD AUTO: 36.8 % (ref 34.8–46.1)
HGB BLD-MCNC: 11.5 G/DL (ref 11.5–15.4)
IMM GRANULOCYTES # BLD AUTO: 0.05 THOUSAND/UL (ref 0–0.2)
IMM GRANULOCYTES NFR BLD AUTO: 1 % (ref 0–2)
LYMPHOCYTES # BLD AUTO: 1.71 THOUSANDS/ÂΜL (ref 0.6–4.47)
LYMPHOCYTES NFR BLD AUTO: 22 % (ref 14–44)
MCH RBC QN AUTO: 27.4 PG (ref 26.8–34.3)
MCHC RBC AUTO-ENTMCNC: 31.3 G/DL (ref 31.4–37.4)
MCV RBC AUTO: 88 FL (ref 82–98)
MONOCYTES # BLD AUTO: 0.49 THOUSAND/ÂΜL (ref 0.17–1.22)
MONOCYTES NFR BLD AUTO: 6 % (ref 4–12)
NEUTROPHILS # BLD AUTO: 5.52 THOUSANDS/ÂΜL (ref 1.85–7.62)
NEUTS SEG NFR BLD AUTO: 70 % (ref 43–75)
NRBC BLD AUTO-RTO: 0 /100 WBCS
PLATELET # BLD AUTO: 273 THOUSANDS/UL (ref 149–390)
PMV BLD AUTO: 10.7 FL (ref 8.9–12.7)
RBC # BLD AUTO: 4.19 MILLION/UL (ref 3.81–5.12)
TSH SERPL DL<=0.05 MIU/L-ACNC: 1.37 UIU/ML (ref 0.45–4.5)
WBC # BLD AUTO: 7.9 THOUSAND/UL (ref 4.31–10.16)

## 2023-01-03 NOTE — PROGRESS NOTES
Assessment/Plan:     Problem List Items Addressed This Visit    None  Visit Diagnoses     Abnormal uterine bleeding (AUB)    -  Primary    Relevant Orders    US pelvis complete w transvaginal    CBC and differential    TSH, 3rd generation with Free T4 reflex        - reviewed possible causes of abnormal uterine bleeding, recommend CBC, TSH, TVUS  - will need endometrial biopsy     RTO 1 mo discussion management, likely EMB    Subjective:      Patient ID: Mick Villegas is a 28 y o  female  HPI  She presents today in consultation for heavy and irregular menstrual bleeding  She notes daily bleeding since July  Bleeding alternates between heavy menstrual bleeding, where she bleeds through maxi pad in 20 minutes and passes golf ball sized clots, and brown or red spotting  Always with more bleeding when up and active  She has previously used OCPs, did not like how they made her feel  The following portions of the patient's history were reviewed and updated as appropriate: allergies, current medications, past family history, past medical history, past social history, past surgical history and problem list  Tubal in 2019 with last Cesearean    Review of Systems  as above    Objective:  /84 (BP Location: Right arm, Patient Position: Sitting, Cuff Size: Large)   Ht 5' 2" (1 575 m)   Wt 99 2 kg (218 lb 12 8 oz)   LMP 12/25/2022 (Exact Date)   BMI 40 02 kg/m²      Physical Exam  Vitals reviewed  Constitutional:       Appearance: She is well-developed  HENT:      Head: Normocephalic  Cardiovascular:      Rate and Rhythm: Normal rate  Pulmonary:      Effort: Pulmonary effort is normal    Abdominal:      General: There is no distension  Palpations: Abdomen is soft  Tenderness: There is no abdominal tenderness  There is no guarding or rebound  Genitourinary:     General: Normal vulva  Exam position: Lithotomy position  Vagina: No bleeding        Cervix: No cervical motion tenderness  Comments: Scant blood in vaginal vault  Cervix difficult to visualize secondary to adhesive disease  Uterus adherent to  scar, does not feel enlarged  Adnexal exam limited by habitus  Musculoskeletal:         General: Normal range of motion  Cervical back: Normal range of motion  Skin:     General: Skin is warm and dry  Neurological:      Mental Status: She is alert and oriented to person, place, and time     Psychiatric:         Behavior: Behavior normal          Overall MDM: moderate   Diagnosis moderate, Data moderate, Risk low

## 2023-01-06 ENCOUNTER — TELEPHONE (OUTPATIENT)
Dept: OBGYN CLINIC | Facility: CLINIC | Age: 33
End: 2023-01-06

## 2023-01-06 NOTE — TELEPHONE ENCOUNTER
Pt wants to get a call back about her test results  Pt said if she is unavailable to  the call you can LV  Thanks!

## 2023-01-13 ENCOUNTER — VBI (OUTPATIENT)
Dept: ADMINISTRATIVE | Facility: OTHER | Age: 33
End: 2023-01-13

## 2023-01-13 ENCOUNTER — HOSPITAL ENCOUNTER (OUTPATIENT)
Dept: ULTRASOUND IMAGING | Facility: HOSPITAL | Age: 33
End: 2023-01-13
Attending: STUDENT IN AN ORGANIZED HEALTH CARE EDUCATION/TRAINING PROGRAM

## 2023-01-13 DIAGNOSIS — N93.9 ABNORMAL UTERINE BLEEDING (AUB): ICD-10-CM

## 2023-01-19 ENCOUNTER — TELEPHONE (OUTPATIENT)
Dept: OBGYN CLINIC | Facility: CLINIC | Age: 33
End: 2023-01-19

## 2023-01-19 NOTE — TELEPHONE ENCOUNTER
Called and left VM with results of ultrasound  Will need D&C  Has f/u scheduled 2/6, but could bring into office in next few days to sign consents  Informed pt of no open appointments, but if she would like to do this, I will try to find her a spot   Otherwise will f/u 2/6/23

## 2023-02-06 ENCOUNTER — OFFICE VISIT (OUTPATIENT)
Dept: OBGYN CLINIC | Age: 33
End: 2023-02-06

## 2023-02-06 VITALS — BODY MASS INDEX: 39.56 KG/M2 | HEIGHT: 62 IN | WEIGHT: 215 LBS

## 2023-02-06 DIAGNOSIS — N93.9 ABNORMAL UTERINE BLEEDING DUE TO ENDOMETRIAL POLYP: Primary | ICD-10-CM

## 2023-02-06 DIAGNOSIS — N84.0 ABNORMAL UTERINE BLEEDING DUE TO ENDOMETRIAL POLYP: Primary | ICD-10-CM

## 2023-02-06 NOTE — H&P (VIEW-ONLY)
Assessment/Plan:       Problem List Items Addressed This Visit        Genitourinary    Abnormal uterine bleeding due to endometrial polyp - Primary     - recommend D&C polypectomy  I also recommend IUD placement given her concern for heavy bleeding moving forwards, though she declines this now and moving forwards  Discussed that while hysterectomy is definitive management, this is a major surgery, with increased risks compared to D&C  We discussed that, if D&C is not sufficient, that we could discuss hysterectomy further, but that I would recommend some form of intervention (IUD, hysteroscopy/D&C, OCPs) prior to consideration of hysterectomy  -Desires to move forward with hysteroscopy, D&C, polypectomy  We discussed risks, benefits, alternatives  We discussed alternatives to include expectant management, office hysteroscopy  We discussed risks including uterine perforation, bleeding, infection, damage to surrounding organs, DVT/PE, death  Discussed increased risk with anesthesia BMI 39  Minimal risks with blood transfusion to include transfusion reaction, TRALI, rare transmission of HIV/Hepatitis C  Benefits include definitive diagnosis  Patient wishes to proceed  -Will plan to proceed with hysteroscopy, D&C  Consent form reviewed in detail with patient, signed by patient      Subjective:      Patient ID: Coni Kuhn is a 28 y o  female  HPI  She presents today for further discussion regarding AUB after ultrasound indicated a large endometrial polyp  She notes that she has completed childbearing, really does not want to have to deal with this moving forwards  Would be interested in hysterectomy if this were a reasonable option  Is not interested in an IUD, does not want a foreign object in her body  As previously noted, she did not like how OCPs made her feel and underwent tubal with  in 2019       The following portions of the patient's history were reviewed and updated as appropriate: allergies, current medications, past family history, past medical history, past social history, past surgical history and problem list  History  x3,     Review of Systems  +AUB    Objective:  Ht 5' 2" (1 575 m)   Wt 97 5 kg (215 lb)   BMI 39 32 kg/m²      Physical Exam  Vitals reviewed  Constitutional:       Appearance: She is well-developed  HENT:      Head: Normocephalic  Cardiovascular:      Rate and Rhythm: Normal rate and regular rhythm  Pulmonary:      Effort: Pulmonary effort is normal    Abdominal:      Palpations: Abdomen is soft  Musculoskeletal:         General: Normal range of motion  Cervical back: Normal range of motion  Skin:     General: Skin is warm and dry  Neurological:      Mental Status: She is alert and oriented to person, place, and time  Psychiatric:         Behavior: Behavior normal          IUD images reviewed with patient: evidence of fundal lesion, likely polyp   Some cystic changes to myometrium, raising possibility of adenomyosis    Overall MDM: moderate   Diagnosis moderate, Data low, Risk moderate

## 2023-02-06 NOTE — PROGRESS NOTES
Assessment/Plan:       Problem List Items Addressed This Visit        Genitourinary    Abnormal uterine bleeding due to endometrial polyp - Primary     - recommend D&C polypectomy  I also recommend IUD placement given her concern for heavy bleeding moving forwards, though she declines this now and moving forwards  Discussed that while hysterectomy is definitive management, this is a major surgery, with increased risks compared to D&C  We discussed that, if D&C is not sufficient, that we could discuss hysterectomy further, but that I would recommend some form of intervention (IUD, hysteroscopy/D&C, OCPs) prior to consideration of hysterectomy  -Desires to move forward with hysteroscopy, D&C, polypectomy  We discussed risks, benefits, alternatives  We discussed alternatives to include expectant management, office hysteroscopy  We discussed risks including uterine perforation, bleeding, infection, damage to surrounding organs, DVT/PE, death  Discussed increased risk with anesthesia BMI 39  Minimal risks with blood transfusion to include transfusion reaction, TRALI, rare transmission of HIV/Hepatitis C  Benefits include definitive diagnosis  Patient wishes to proceed  -Will plan to proceed with hysteroscopy, D&C  Consent form reviewed in detail with patient, signed by patient      Subjective:      Patient ID: Ja Montoya is a 28 y o  female  HPI  She presents today for further discussion regarding AUB after ultrasound indicated a large endometrial polyp  She notes that she has completed childbearing, really does not want to have to deal with this moving forwards  Would be interested in hysterectomy if this were a reasonable option  Is not interested in an IUD, does not want a foreign object in her body  As previously noted, she did not like how OCPs made her feel and underwent tubal with  in 2019       The following portions of the patient's history were reviewed and updated as appropriate: allergies, current medications, past family history, past medical history, past social history, past surgical history and problem list  History  x3,     Review of Systems  +AUB    Objective:  Ht 5' 2" (1 575 m)   Wt 97 5 kg (215 lb)   BMI 39 32 kg/m²      Physical Exam  Vitals reviewed  Constitutional:       Appearance: She is well-developed  HENT:      Head: Normocephalic  Cardiovascular:      Rate and Rhythm: Normal rate and regular rhythm  Pulmonary:      Effort: Pulmonary effort is normal    Abdominal:      Palpations: Abdomen is soft  Musculoskeletal:         General: Normal range of motion  Cervical back: Normal range of motion  Skin:     General: Skin is warm and dry  Neurological:      Mental Status: She is alert and oriented to person, place, and time  Psychiatric:         Behavior: Behavior normal          IUD images reviewed with patient: evidence of fundal lesion, likely polyp   Some cystic changes to myometrium, raising possibility of adenomyosis    Overall MDM: moderate   Diagnosis moderate, Data low, Risk moderate

## 2023-02-07 ENCOUNTER — TELEPHONE (OUTPATIENT)
Dept: OBGYN CLINIC | Facility: CLINIC | Age: 33
End: 2023-02-07

## 2023-02-07 NOTE — TELEPHONE ENCOUNTER
Talked to patient she is scheduled for her surgical procedure on 3/2/2023 with Dr Huber Pérez in the [facility]  Surgical packet mailed out today

## 2023-02-24 NOTE — PRE-PROCEDURE INSTRUCTIONS
Pre-Surgery Instructions:   Medication Instructions   • albuterol (2 5 mg/3 mL) 0 083 % nebulizer solution Uses PRN- OK to take day of surgery   • albuterol (PROVENTIL HFA,VENTOLIN HFA) 90 mcg/act inhaler Uses PRN- OK to take day of surgery   • ascorbic acid (VITAMIN C) 500 MG tablet Hold starting today 2/24   • b complex vitamins capsule Hold starting today 2/24   • ibuprofen (MOTRIN) 600 mg tablet Stop taking 3 days prior to surgery  - Reviewed with patient, in detail, instructions from "My Surgical Experience"  - Instructed to avoid all OTC vitamins/supplements one week prior to surgery and NSAIDS for 3 days prior to surgery per anesthesia guidelines  Tylenol ok to take PRN  - Advised patient nothing eat or drink after midnight prior to surgery, except medications that he/she is to take morning DOS with only small sip of water     - Advised patient that Nahid Miller will call with surgery arrival time and hospital directions the business day prior to surgery  Patient verbalized understanding of current visitor restrictions/masking guidelines and advised that he/she can confirm these at time of arrival call with Nahid Miller      - Patient verbalized understanding and knows to call surgeon's office with any additional questions prior to surgery  Instructed to call surgeon's office in meantime with any new illnesses/exposure, patient verbalized understanding

## 2023-02-27 ENCOUNTER — APPOINTMENT (OUTPATIENT)
Dept: LAB | Facility: CLINIC | Age: 33
End: 2023-02-27

## 2023-02-27 DIAGNOSIS — Z01.818 PRE-OP TESTING: ICD-10-CM

## 2023-02-27 LAB
ANION GAP SERPL CALCULATED.3IONS-SCNC: 5 MMOL/L (ref 4–13)
BUN SERPL-MCNC: 10 MG/DL (ref 5–25)
CALCIUM SERPL-MCNC: 9.7 MG/DL (ref 8.3–10.1)
CHLORIDE SERPL-SCNC: 108 MMOL/L (ref 96–108)
CO2 SERPL-SCNC: 25 MMOL/L (ref 21–32)
CREAT SERPL-MCNC: 0.69 MG/DL (ref 0.6–1.3)
ERYTHROCYTE [DISTWIDTH] IN BLOOD BY AUTOMATED COUNT: 13.7 % (ref 11.6–15.1)
GFR SERPL CREATININE-BSD FRML MDRD: 114 ML/MIN/1.73SQ M
GLUCOSE P FAST SERPL-MCNC: 91 MG/DL (ref 65–99)
HCT VFR BLD AUTO: 35.6 % (ref 34.8–46.1)
HGB BLD-MCNC: 11.1 G/DL (ref 11.5–15.4)
MCH RBC QN AUTO: 26.6 PG (ref 26.8–34.3)
MCHC RBC AUTO-ENTMCNC: 31.2 G/DL (ref 31.4–37.4)
MCV RBC AUTO: 85 FL (ref 82–98)
PLATELET # BLD AUTO: 267 THOUSANDS/UL (ref 149–390)
PMV BLD AUTO: 10.7 FL (ref 8.9–12.7)
POTASSIUM SERPL-SCNC: 4.4 MMOL/L (ref 3.5–5.3)
RBC # BLD AUTO: 4.18 MILLION/UL (ref 3.81–5.12)
SODIUM SERPL-SCNC: 138 MMOL/L (ref 135–147)
WBC # BLD AUTO: 6.72 THOUSAND/UL (ref 4.31–10.16)

## 2023-03-01 ENCOUNTER — ANESTHESIA EVENT (OUTPATIENT)
Dept: PERIOP | Facility: HOSPITAL | Age: 33
End: 2023-03-01

## 2023-03-01 NOTE — ANESTHESIA PREPROCEDURE EVALUATION
Procedure:  DILATATION AND CURETTAGE (D&C) WITH HYSTEROSCOPY (Uterus)  POLYPECTOMY (Uterus)    Relevant Problems   NEURO/PSYCH   (+) Depression with anxiety      Asthma    Depression Postpartum depression  Anxiety    Kidney stone    Kidney infection    Rh incompatibility    Urinary tract infection    Varicella Pt said she had chicken pox twice when she was younger  Anemia Anemia during pregnancy  Miscarriage    MRSA infection    Preeclampsia    Preeclampsia in postpartum period    Cellulitis of abdominal wall    Postpartum depression    Right flank pain        Physical Exam    Airway    Mallampati score: II  TM Distance: >3 FB  Neck ROM: full     Dental       Cardiovascular  Cardiovascular exam normal    Pulmonary  Pulmonary exam normal     Other Findings        Anesthesia Plan  ASA Score- 2     Anesthesia Type- general with ASA Monitors  Additional Monitors:   Airway Plan:           Plan Factors-Exercise tolerance (METS): >4 METS  Chart reviewed  EKG reviewed  Imaging results reviewed  Existing labs reviewed  Patient summary reviewed  Patient is a current smoker  Patient instructed to abstain from smoking on day of procedure  Induction- intravenous  Postoperative Plan- Plan for postoperative opioid use  Planned trial extubation    Informed Consent- Anesthetic plan and risks discussed with patient  I personally reviewed this patient with the CRNA  Discussed and agreed on the Anesthesia Plan with the CRNA  Evelio Morel

## 2023-03-02 ENCOUNTER — HOSPITAL ENCOUNTER (OUTPATIENT)
Facility: HOSPITAL | Age: 33
Setting detail: OUTPATIENT SURGERY
Discharge: HOME | End: 2023-03-02
Attending: STUDENT IN AN ORGANIZED HEALTH CARE EDUCATION/TRAINING PROGRAM | Admitting: STUDENT IN AN ORGANIZED HEALTH CARE EDUCATION/TRAINING PROGRAM

## 2023-03-02 ENCOUNTER — ANESTHESIA (OUTPATIENT)
Dept: PERIOP | Facility: HOSPITAL | Age: 33
End: 2023-03-02

## 2023-03-02 VITALS
RESPIRATION RATE: 18 BRPM | SYSTOLIC BLOOD PRESSURE: 113 MMHG | OXYGEN SATURATION: 100 % | HEIGHT: 62 IN | BODY MASS INDEX: 39.19 KG/M2 | WEIGHT: 212.96 LBS | HEART RATE: 79 BPM | DIASTOLIC BLOOD PRESSURE: 87 MMHG | TEMPERATURE: 98.2 F

## 2023-03-02 DIAGNOSIS — N84.0 UTERINE POLYP: ICD-10-CM

## 2023-03-02 LAB
EXT PREGNANCY TEST URINE: NEGATIVE
EXT. CONTROL: NORMAL

## 2023-03-02 RX ORDER — SODIUM CHLORIDE, SODIUM LACTATE, POTASSIUM CHLORIDE, CALCIUM CHLORIDE 600; 310; 30; 20 MG/100ML; MG/100ML; MG/100ML; MG/100ML
INJECTION, SOLUTION INTRAVENOUS CONTINUOUS PRN
Status: DISCONTINUED | OUTPATIENT
Start: 2023-03-02 | End: 2023-03-02

## 2023-03-02 RX ORDER — MAGNESIUM HYDROXIDE 1200 MG/15ML
LIQUID ORAL AS NEEDED
Status: DISCONTINUED | OUTPATIENT
Start: 2023-03-02 | End: 2023-03-02 | Stop reason: HOSPADM

## 2023-03-02 RX ORDER — ONDANSETRON 2 MG/ML
4 INJECTION INTRAMUSCULAR; INTRAVENOUS ONCE AS NEEDED
Status: DISCONTINUED | OUTPATIENT
Start: 2023-03-02 | End: 2023-03-02 | Stop reason: HOSPADM

## 2023-03-02 RX ORDER — LIDOCAINE HYDROCHLORIDE 20 MG/ML
INJECTION, SOLUTION EPIDURAL; INFILTRATION; INTRACAUDAL; PERINEURAL AS NEEDED
Status: DISCONTINUED | OUTPATIENT
Start: 2023-03-02 | End: 2023-03-02

## 2023-03-02 RX ORDER — FENTANYL CITRATE 50 UG/ML
INJECTION, SOLUTION INTRAMUSCULAR; INTRAVENOUS AS NEEDED
Status: DISCONTINUED | OUTPATIENT
Start: 2023-03-02 | End: 2023-03-02

## 2023-03-02 RX ORDER — PROPOFOL 10 MG/ML
INJECTION, EMULSION INTRAVENOUS AS NEEDED
Status: DISCONTINUED | OUTPATIENT
Start: 2023-03-02 | End: 2023-03-02

## 2023-03-02 RX ORDER — SODIUM CHLORIDE, SODIUM LACTATE, POTASSIUM CHLORIDE, CALCIUM CHLORIDE 600; 310; 30; 20 MG/100ML; MG/100ML; MG/100ML; MG/100ML
75 INJECTION, SOLUTION INTRAVENOUS CONTINUOUS
Status: DISCONTINUED | OUTPATIENT
Start: 2023-03-02 | End: 2023-03-02 | Stop reason: HOSPADM

## 2023-03-02 RX ORDER — LIDOCAINE HYDROCHLORIDE AND EPINEPHRINE 10; 10 MG/ML; UG/ML
INJECTION, SOLUTION INFILTRATION; PERINEURAL AS NEEDED
Status: DISCONTINUED | OUTPATIENT
Start: 2023-03-02 | End: 2023-03-02 | Stop reason: HOSPADM

## 2023-03-02 RX ORDER — HYDROMORPHONE HCL/PF 1 MG/ML
0.2 SYRINGE (ML) INJECTION
Status: DISCONTINUED | OUTPATIENT
Start: 2023-03-02 | End: 2023-03-02 | Stop reason: HOSPADM

## 2023-03-02 RX ORDER — KETOROLAC TROMETHAMINE 30 MG/ML
INJECTION, SOLUTION INTRAMUSCULAR; INTRAVENOUS AS NEEDED
Status: DISCONTINUED | OUTPATIENT
Start: 2023-03-02 | End: 2023-03-02

## 2023-03-02 RX ORDER — MIDAZOLAM HYDROCHLORIDE 2 MG/2ML
INJECTION, SOLUTION INTRAMUSCULAR; INTRAVENOUS AS NEEDED
Status: DISCONTINUED | OUTPATIENT
Start: 2023-03-02 | End: 2023-03-02

## 2023-03-02 RX ORDER — ONDANSETRON 2 MG/ML
INJECTION INTRAMUSCULAR; INTRAVENOUS AS NEEDED
Status: DISCONTINUED | OUTPATIENT
Start: 2023-03-02 | End: 2023-03-02

## 2023-03-02 RX ORDER — DEXAMETHASONE SODIUM PHOSPHATE 10 MG/ML
INJECTION, SOLUTION INTRAMUSCULAR; INTRAVENOUS AS NEEDED
Status: DISCONTINUED | OUTPATIENT
Start: 2023-03-02 | End: 2023-03-02

## 2023-03-02 RX ORDER — FENTANYL CITRATE/PF 50 MCG/ML
25 SYRINGE (ML) INJECTION
Status: DISCONTINUED | OUTPATIENT
Start: 2023-03-02 | End: 2023-03-02 | Stop reason: HOSPADM

## 2023-03-02 RX ADMIN — FENTANYL CITRATE 50 MCG: 50 INJECTION INTRAMUSCULAR; INTRAVENOUS at 08:59

## 2023-03-02 RX ADMIN — KETOROLAC TROMETHAMINE 30 MG: 30 INJECTION, SOLUTION INTRAMUSCULAR at 09:21

## 2023-03-02 RX ADMIN — FENTANYL CITRATE 25 MCG: 50 INJECTION INTRAMUSCULAR; INTRAVENOUS at 09:41

## 2023-03-02 RX ADMIN — SODIUM CHLORIDE, SODIUM LACTATE, POTASSIUM CHLORIDE, AND CALCIUM CHLORIDE: .6; .31; .03; .02 INJECTION, SOLUTION INTRAVENOUS at 08:54

## 2023-03-02 RX ADMIN — MIDAZOLAM 2 MG: 1 INJECTION INTRAMUSCULAR; INTRAVENOUS at 08:54

## 2023-03-02 RX ADMIN — ONDANSETRON 4 MG: 2 INJECTION INTRAMUSCULAR; INTRAVENOUS at 09:05

## 2023-03-02 RX ADMIN — FENTANYL CITRATE 25 MCG: 50 INJECTION INTRAMUSCULAR; INTRAVENOUS at 09:07

## 2023-03-02 RX ADMIN — PROPOFOL 200 MG: 10 INJECTION, EMULSION INTRAVENOUS at 08:59

## 2023-03-02 RX ADMIN — DEXAMETHASONE SODIUM PHOSPHATE 10 MG: 10 INJECTION, SOLUTION INTRAMUSCULAR; INTRAVENOUS at 09:05

## 2023-03-02 RX ADMIN — LIDOCAINE HYDROCHLORIDE 100 MG: 20 INJECTION, SOLUTION EPIDURAL; INFILTRATION; INTRACAUDAL at 08:59

## 2023-03-02 NOTE — INTERVAL H&P NOTE
H&P reviewed  After examining the patient I find no changes in the patients condition since the H&P had been written      Vitals:    03/02/23 0825   BP: 126/87   Pulse: 80   Resp: 16   Temp: 97 7 °F (36 5 °C)   SpO2: 98%

## 2023-03-02 NOTE — ANESTHESIA POSTPROCEDURE EVALUATION
Post-Op Assessment Note    CV Status:  Stable    Pain management: adequate     Mental Status:  Alert and awake   Hydration Status:  Euvolemic   PONV Controlled:  Controlled   Airway Patency:  Patent      Post Op Vitals Reviewed: Yes      Staff: CRNA         There were no known notable events for this encounter      BP   117/61   Temp 98 4   Pulse 84   Resp 18   SpO2 100

## 2023-03-02 NOTE — OP NOTE
OPERATIVE REPORT  PATIENT NAME: Chase Flowers    :  1990  MRN: [id number]  Pt Location: [facility] OR ROOM 02    SURGERY DATE: 3/2/2023    Surgeon(s) and Role:     * Ruth Armas MD - Primary    Preop Diagnosis:  Uterine polyp [N84 0]    Post-Op Diagnosis Codes:     * Uterine polyp [N84 0]    Procedure(s):  DILATATION AND CURETTAGE (D&C) WITH HYSTEROSCOPY  POLYPECTOMY    Specimen(s):  ID Type Source Tests Collected by Time Destination   1 : endometrial polyp and curettings  Tissue Endometrium TISSUE EXAM Flora Gray MD 3/2/2023 0915        Estimated Blood Loss: 5 mL    Drains: none    Anesthesia Type: LMA     Operative Indications:  Uterine polyp [N84 0]    Operative Findings:  NEFG, bilateral tubal ostea patent  Large pedunculated polyp arising from left uterine wall, removed    Complications:   None    Procedure and Technique:  Brief History    All risks, benefits, and alternatives to the procedure were discussed with the patient and she had the opportunity to ask questions  Informed consent was obtained  Description of Procedure    Patient was taken to the operating room were a time out was performed to confirm correct patient and correct procedure  General LMA anesthesia (LMA) was administered and the patient was positioned on the OR table in the dorsal lithotomy position  All pressure points were padded and a blankets were placed to maintain control of core body temperature  The patient was prepped and draped in the usual sterile fashion  Operative Technique    Speculum was inserted into the vagina and the anterior lip of the cervix was grasped with a single toothed tenaculum  Cervical block was placed  The uterus was sounded to 10 cm  The cervix was serially dilated for introduction of the hysteroscope  Hysteroscope was introduced under direct visualization using normal saline solution as the distention media   Hysteroscope was advanced to the uterine fundus and the entire uterine cavity was inspected in a systematic manner  There was noted to be a large pedunculated fibroid  Hysteroscope was withdrawn and polyp was removed in pieces  The hysteroscope was then re-introduced under direct visualization, again using normal saline solution as the distention media  Polyp was noted to have been removed  Sharp curetting was then performed, starting at the 12'oclock position and rotating a total of 360 degrees to cover all surfaces  Endometrial tissue was obtained and sent for pathology  The single toothed tenaculum was removed from the anterior lip of the cervix  Hemostasis was confirmed at the tenaculum puncture sites  Speculum was then removed from the vagina  At the conclusion of the procedure, all needle, sponge, and instrument counts were noted to be correct x2  Patient tolerated the procedure well and was transferred to PACU in stable condition        I was present for the entire procedure    Patient Disposition:  PACU  and extubated and stable        SIGNATURE: Tylor Henry MD  DATE: March 2, 2023  TIME: 9:23 AM

## 2023-03-08 ENCOUNTER — TELEPHONE (OUTPATIENT)
Dept: OBGYN CLINIC | Facility: CLINIC | Age: 33
End: 2023-03-08

## 2023-04-03 ENCOUNTER — TELEPHONE (OUTPATIENT)
Dept: OBGYN CLINIC | Facility: CLINIC | Age: 33
End: 2023-04-03

## 2023-04-03 NOTE — TELEPHONE ENCOUNTER
D and C and polypectomy 3/2  She had watery dscfor few days ff  Mid March had 8 day menses and watery discharge a few more days  About a week ago she started with watery discharge again - some pink, yellow tinge and slight odor  She had shaky feeling one evening but no fever  BV?    Thanks

## 2023-04-03 NOTE — TELEPHONE ENCOUNTER
Patient had polyp removed 03/02/23 and over the weekend has had cramps and pink and yellow bleeding

## 2023-05-03 ENCOUNTER — TELEPHONE (OUTPATIENT)
Dept: OBGYN CLINIC | Facility: CLINIC | Age: 33
End: 2023-05-03

## 2023-05-03 NOTE — TELEPHONE ENCOUNTER
Pt took provera 10 mg 4/17 to 4/27   Has an appt with Iggy Figueroa 5/4 and EC 5/22  Pt had bleeding every day while on provera  1 pad/day and sometimes clots  Was very emotional on the provera also  Since she has stopped provera, she is bleeding about 2 pads a day  I told pt this is expected but pt very concerned  She made an appt with Iggy Henry tomorrow - I feel not needed and appt with you 5/22  When can she expect bleeding to stop? What f/u? Cancel Ashley appt?    Thanks

## 2023-05-03 NOTE — TELEPHONE ENCOUNTER
She has now cancelled two appointments with physicians  So its up to her if she would like to see [facility] tomorrow or wait to see me   We can order an ultrasound now, or she can discuss with [facility] tomorrow

## 2023-05-03 NOTE — TELEPHONE ENCOUNTER
Pt had to cancel her appt for Friday with Dr David Monroy because of work  She is scheduled with Dr Josr Morris for 5/22 in [place]  But is still having ongoing bleeding and cramping  She has finished medicine ordered post op but is still bleeding  She booked an ovs with the PA for Thursday (tomorrow)  Feels maybe she needs another ultrasound  Would like to talk to Dr Josr Morris  Please advise

## 2023-05-03 NOTE — TELEPHONE ENCOUNTER
Pt will keep appt with [facility] tomorrow - and at that time discuss also whether to keep appt with [facility] in 2 weeks

## 2023-05-04 ENCOUNTER — OFFICE VISIT (OUTPATIENT)
Dept: OBGYN CLINIC | Facility: CLINIC | Age: 33
End: 2023-05-04

## 2023-05-04 VITALS — BODY MASS INDEX: 39.43 KG/M2 | WEIGHT: 215.6 LBS | DIASTOLIC BLOOD PRESSURE: 64 MMHG | SYSTOLIC BLOOD PRESSURE: 112 MMHG

## 2023-05-04 DIAGNOSIS — Z11.51 SCREENING FOR HPV (HUMAN PAPILLOMAVIRUS): ICD-10-CM

## 2023-05-04 DIAGNOSIS — Z11.3 SCREENING FOR STDS (SEXUALLY TRANSMITTED DISEASES): ICD-10-CM

## 2023-05-04 DIAGNOSIS — N93.9 ABNORMAL UTERINE BLEEDING (AUB): Primary | ICD-10-CM

## 2023-05-04 RX ORDER — NORETHINDRONE ACETATE AND ETHINYL ESTRADIOL 1MG-20(21)
1 KIT ORAL DAILY
Qty: 90 TABLET | Refills: 1 | Status: SHIPPED | OUTPATIENT
Start: 2023-05-04

## 2023-05-04 NOTE — PROGRESS NOTES
Yuan Bryan  1990    S:  35 y o  female with c/o irregular bleeding since her D&C on 3/2/23  She was first seen in January 2023 for AUB  CBC and TSH were normal  TVUS showed 2 1 cm polypoid lesion of upper endometrial canal, 2 2 cm fibroid of CLIFTON, and normal ovaries  Was recommended D&C polypectomy  Was then seen 2/6/23 to be consented for D&C  At the time it was discussed that if D&C failed, hysterectomy would be provide definitive management, but was recommended some alternative method of control (I e  contraception) prior to consideration of hysterectomy  She declined contraceptive management as she birth control pills she was on as a young adult caused mood deterioration  She underwent D&C on 3/2/23  Pathology revealed endometrial polyp and proliferative endometrium  Otherwise benign w/o hyperplasia, atypia, or malignancy  She reports irregular bleeding and occasional discharge since procedure  Had a fever a few weeks ago, Tmax 99, associated with rigors  This is high for her as she typically runs 97  Temp resolved after a day or two  Bleeding starts and stops  Increases with movement  She works as a  so on her feet a lot and feels she bleeds more after her shifts  Bleeding was heavier, now mostly spotting to light flow  Was treated with provera 4/17 - 4/27  This lightened her bleeding, but then increased slightly x a few days  No bleeding today  When she last called in she was offered US to evaluate, but declined preferring to wait for appt  Her discharge was yellow, now more clear to white  No itching or odor  Sexually active with her   Tubal ligation used for contraception  Desires STD testing  Review of Systems   Constitutional: + fatigue  Neg F/C/S  Respiratory: Negative  Cardiovascular: Negative  Gastrointestinal: Negative for abd pain, N/V  Genitourinary: + vaginal bleeding, discharge - now resovled    Negative for difficulty urinating, pelvic pain, vaginal itching or odor  O:  /64 (BP Location: Left arm, Patient Position: Sitting, Cuff Size: Large)   Wt 97 8 kg (215 lb 9 6 oz)   LMP  (LMP Unknown) Comment: has been bleeding since March 2023  BMI 39 43 kg/m²      She appears well and is in no distress  Obese  Normocephalic, atraumatic  Normal respiratory effort  Abdomen is soft and nontender  External genitals are normal without lesions or rashes  Vagina is without discharge or bleeding  Cervix is without lesions or discharge  No CMT  Uterus is nontender, no masses  Adnexa are nontender, no pelvic masses appreciated  No focal neurological deficits  Normal mood, affect, and behavior  A/P:  Diagnoses and all orders for this visit:    Abnormal uterine bleeding (AUB)  -     US pelvis complete w transvaginal; Future  -     CBC and differential; Future  -     Chlamydia/GC amplified DNA by PCR  -     Liquid-based pap, screening  -     norethindrone-ethinyl estradiol (Loestrin Fe 1/20) 1-20 MG-MCG per tablet; Take 1 tablet by mouth daily    Screening for STDs (sexually transmitted diseases)  -     Chlamydia/GC amplified DNA by PCR    Screening for HPV (human papillomavirus)  -     Chlamydia/GC amplified DNA by PCR  -     Liquid-based pap, screening        Reviewed options for management of AUB  Given fatigue sx and persistent bleeding, will check CBC to evaluate for anemia  Advised iron supplement  Recent TSH was normal  Slip for TVUS given  We reviewed hormonal options to control bleeding  She is aware of option of hysterectomy, but would need f/u with physician to discuss  Strongly encouraged hormonal therapy while awaiting preop appt and ultimately surgical scheduling, which would likely take months  She is a smoker of 1-2 cigarettes per day, most days  Strongly encouraged cessation as this is more a bad habit rather than dependency  She states she is aware  We discussed risk associated with estrogen use and smoking       Denies migraine with aura, HTN, CV ds, liver disease, personal hx or current dx of breast CA, or prior blood clot  No FH of clot formation  We reviewed available contraception options, along with the risks, benefits, and efficacy profile of each method in detail  Vehemently opposed to IUD  Declines Depo for fear of weight gain and mood changes  Will trial pill  · Compliance with daily pill taking reinforced  Reviewed management for missed pills  · Reviewed possibility for irregular bleeding in first 3 months of pill use  Can call with concerns  · Condoms recommended for STD prevention  Will notify with today's results  · Warning sings of use reviewed  She will report these immediately should they occur  · Potential for drug interaction reviewed  Should notify all HCP of use to avoid dec efficacy     RTO in 3 months for pill check and annual exam, sooner as needed or if desires hysterectomy consult

## 2023-05-08 ENCOUNTER — TELEPHONE (OUTPATIENT)
Dept: OBGYN CLINIC | Facility: CLINIC | Age: 33
End: 2023-05-08

## 2023-05-08 NOTE — TELEPHONE ENCOUNTER
PT last seen 5/4 with [facility],     Pt would like a call regarding her results from that visit please,    thanks

## 2023-05-09 LAB
C TRACH DNA SPEC QL NAA+PROBE: NEGATIVE
HPV HR 12 DNA CVX QL NAA+PROBE: NEGATIVE
HPV16 DNA CVX QL NAA+PROBE: NEGATIVE
HPV18 DNA CVX QL NAA+PROBE: NEGATIVE
N GONORRHOEA DNA SPEC QL NAA+PROBE: NEGATIVE

## 2023-05-10 ENCOUNTER — APPOINTMENT (OUTPATIENT)
Dept: LAB | Facility: CLINIC | Age: 33
End: 2023-05-10

## 2023-05-10 DIAGNOSIS — N93.9 ABNORMAL UTERINE BLEEDING (AUB): ICD-10-CM

## 2023-05-10 LAB
BASOPHILS # BLD AUTO: 0.04 THOUSANDS/ÂΜL (ref 0–0.1)
BASOPHILS NFR BLD AUTO: 0 % (ref 0–1)
EOSINOPHIL # BLD AUTO: 0.14 THOUSAND/ÂΜL (ref 0–0.61)
EOSINOPHIL NFR BLD AUTO: 1 % (ref 0–6)
ERYTHROCYTE [DISTWIDTH] IN BLOOD BY AUTOMATED COUNT: 15 % (ref 11.6–15.1)
HCT VFR BLD AUTO: 39.7 % (ref 34.8–46.1)
HGB BLD-MCNC: 12.3 G/DL (ref 11.5–15.4)
IMM GRANULOCYTES # BLD AUTO: 0.04 THOUSAND/UL (ref 0–0.2)
IMM GRANULOCYTES NFR BLD AUTO: 0 % (ref 0–2)
LYMPHOCYTES # BLD AUTO: 2.3 THOUSANDS/ÂΜL (ref 0.6–4.47)
LYMPHOCYTES NFR BLD AUTO: 22 % (ref 14–44)
MCH RBC QN AUTO: 25.8 PG (ref 26.8–34.3)
MCHC RBC AUTO-ENTMCNC: 31 G/DL (ref 31.4–37.4)
MCV RBC AUTO: 83 FL (ref 82–98)
MONOCYTES # BLD AUTO: 0.72 THOUSAND/ÂΜL (ref 0.17–1.22)
MONOCYTES NFR BLD AUTO: 7 % (ref 4–12)
NEUTROPHILS # BLD AUTO: 7.22 THOUSANDS/ÂΜL (ref 1.85–7.62)
NEUTS SEG NFR BLD AUTO: 70 % (ref 43–75)
NRBC BLD AUTO-RTO: 0 /100 WBCS
PLATELET # BLD AUTO: 307 THOUSANDS/UL (ref 149–390)
PMV BLD AUTO: 10.8 FL (ref 8.9–12.7)
RBC # BLD AUTO: 4.76 MILLION/UL (ref 3.81–5.12)
WBC # BLD AUTO: 10.46 THOUSAND/UL (ref 4.31–10.16)

## 2023-05-11 LAB
LAB AP GYN PRIMARY INTERPRETATION: NORMAL
Lab: NORMAL

## 2023-05-12 ENCOUNTER — TELEPHONE (OUTPATIENT)
Dept: OBGYN CLINIC | Facility: CLINIC | Age: 33
End: 2023-05-12

## 2023-05-12 NOTE — TELEPHONE ENCOUNTER
----- Message from Queenie Regalado PA-C sent at 5/11/2023  5:10 PM EDT -----  Please call Luther (she does not use Mychart) to let her know her CBC showed she is not currently anemic  Please let me know if she has questions    Duncan Betancourt

## 2023-05-23 ENCOUNTER — TELEPHONE (OUTPATIENT)
Dept: OBGYN CLINIC | Facility: MEDICAL CENTER | Age: 33
End: 2023-05-23

## 2023-05-23 DIAGNOSIS — R31.9 HEMATURIA, UNSPECIFIED TYPE: Primary | ICD-10-CM

## 2023-05-23 NOTE — TELEPHONE ENCOUNTER
Spoke with [employer]  She reports light bleeding for a few days then stops  Started in the 2 days with a heavier flow, now more like a period  Completing her first Loestrin pack tomorrow  No relief with NSAIDS  Recommended she double up on pills today and tomorrow  Discussed this may not stop bleeding and reviewed potential for irregular bleeding for the first 3 months on contraception  She is scheduled for US 5/31/23  Will notify with results  Over weekend had right flank pain and ? Hematuria  Not sure if from urethra or from vaginal bleeding  No other UTI sx reported  will check UA/UC  Precautions given

## 2023-06-06 ENCOUNTER — TELEMEDICINE (OUTPATIENT)
Dept: OBGYN CLINIC | Facility: MEDICAL CENTER | Age: 33
End: 2023-06-06
Payer: COMMERCIAL

## 2023-06-06 ENCOUNTER — TELEPHONE (OUTPATIENT)
Dept: OBGYN CLINIC | Facility: MEDICAL CENTER | Age: 33
End: 2023-06-06

## 2023-06-06 DIAGNOSIS — N93.9 ABNORMAL UTERINE BLEEDING (AUB): Primary | ICD-10-CM

## 2023-06-06 DIAGNOSIS — D72.828 OTHER ELEVATED WHITE BLOOD CELL (WBC) COUNT: ICD-10-CM

## 2023-06-06 PROCEDURE — G2012 BRIEF CHECK IN BY MD/QHP: HCPCS | Performed by: STUDENT IN AN ORGANIZED HEALTH CARE EDUCATION/TRAINING PROGRAM

## 2023-06-06 NOTE — PROGRESS NOTES
Virtual Brief Visit    This Visit is being completed by telephone  The Patient is located at Home and in the following state in which I hold an active license [place]    The patient was identified by name and date of birth  Kat Srivastava was informed that this is a telemedicine visit and that the visit is being conducted through Telephone  My office door was closed  No one else was in the room  She acknowledged consent and understanding of privacy and security of the video platform  The patient has agreed to participate and understands they can discontinue the visit at any time  Patient is aware this is a billable service  Assessment/Plan:    Problem List Items Addressed This Visit    None  Visit Diagnoses     Abnormal uterine bleeding (AUB)    -  Primary  We discussed treatment options at length  Has only been on this pill a little over a month, could continue trial 3-6 months  Could try different OCP  Declines, prefers to avoid meds  Discussed mirena, depo-provera - declines  We discussed ablation vs hysterectomy  Leaning towards hysterectomy for definitive management  She has h/o CS x 3  Discussed increased risk of surgical complications as well as recovery time  She is interested in proceeding with TLH, BS w/ ovarian conservation  F/u for pre-op  Repeat US should be complete by then  Other elevated white blood cell (WBC) count        Relevant Orders    CBC          Recent Visits  No visits were found meeting these conditions  Showing recent visits within past 7 days and meeting all other requirements  Today's Visits  Date Type Provider Dept   06/06/23 Telephone Ja Palomo MA [facility]   Showing today's visits and meeting all other requirements  Future Appointments  No visits were found meeting these conditions  Showing future appointments within next 150 days and meeting all other requirements     34 yo [id number] presents for AUB  We briefly recapped her course so far  Started with abnormal bleeding July 2022  Had bloodwork and US  US showed polyp  S/p hysteroscopy, polypectomy in March w/ Dr Nitza Kenyon  Has repeat ultrasound Monday  Feels fatigued  Continues to bleed daily  Started provera which didn't stop bleeding  Then started [facility] 5/4/23  Feels very agitated, think due to OCP  Has tubal; does not desire future pregnancy  Leaning towards surgical intervention      Visit Time  Total Visit Duration: 20

## 2023-06-12 ENCOUNTER — APPOINTMENT (OUTPATIENT)
Dept: LAB | Facility: HOSPITAL | Age: 33
End: 2023-06-12
Payer: COMMERCIAL

## 2023-06-12 ENCOUNTER — HOSPITAL ENCOUNTER (OUTPATIENT)
Dept: ULTRASOUND IMAGING | Facility: HOSPITAL | Age: 33
Discharge: HOME | End: 2023-06-12
Payer: COMMERCIAL

## 2023-06-12 DIAGNOSIS — N93.9 ABNORMAL UTERINE BLEEDING (AUB): ICD-10-CM

## 2023-06-12 DIAGNOSIS — D72.828 OTHER ELEVATED WHITE BLOOD CELL (WBC) COUNT: ICD-10-CM

## 2023-06-12 DIAGNOSIS — R31.9 HEMATURIA, UNSPECIFIED TYPE: ICD-10-CM

## 2023-06-12 LAB
BACTERIA UR QL AUTO: ABNORMAL /HPF
BILIRUB UR QL STRIP: NEGATIVE
CLARITY UR: CLEAR
COLOR UR: ABNORMAL
ERYTHROCYTE [DISTWIDTH] IN BLOOD BY AUTOMATED COUNT: 14.8 % (ref 11.6–15.1)
GLUCOSE UR STRIP-MCNC: NEGATIVE MG/DL
HCT VFR BLD AUTO: 38 % (ref 34.8–46.1)
HGB BLD-MCNC: 12.3 G/DL (ref 11.5–15.4)
HGB UR QL STRIP.AUTO: ABNORMAL
KETONES UR STRIP-MCNC: NEGATIVE MG/DL
LEUKOCYTE ESTERASE UR QL STRIP: NEGATIVE
MCH RBC QN AUTO: 26.5 PG (ref 26.8–34.3)
MCHC RBC AUTO-ENTMCNC: 32.4 G/DL (ref 31.4–37.4)
MCV RBC AUTO: 82 FL (ref 82–98)
NITRITE UR QL STRIP: NEGATIVE
NON-SQ EPI CELLS URNS QL MICRO: ABNORMAL /HPF
PH UR STRIP.AUTO: 6 [PH]
PLATELET # BLD AUTO: 266 THOUSANDS/UL (ref 149–390)
PMV BLD AUTO: 10.3 FL (ref 8.9–12.7)
PROT UR STRIP-MCNC: NEGATIVE MG/DL
RBC # BLD AUTO: 4.64 MILLION/UL (ref 3.81–5.12)
RBC #/AREA URNS AUTO: ABNORMAL /HPF
SP GR UR STRIP.AUTO: 1.01 (ref 1–1.03)
UROBILINOGEN UR STRIP-ACNC: <2 MG/DL
WBC # BLD AUTO: 7.9 THOUSAND/UL (ref 4.31–10.16)
WBC #/AREA URNS AUTO: ABNORMAL /HPF

## 2023-06-12 PROCEDURE — 36415 COLL VENOUS BLD VENIPUNCTURE: CPT

## 2023-06-12 PROCEDURE — 81001 URINALYSIS AUTO W/SCOPE: CPT

## 2023-06-12 PROCEDURE — 76830 TRANSVAGINAL US NON-OB: CPT

## 2023-06-12 PROCEDURE — 87086 URINE CULTURE/COLONY COUNT: CPT

## 2023-06-12 PROCEDURE — 85027 COMPLETE CBC AUTOMATED: CPT

## 2023-06-12 PROCEDURE — 76856 US EXAM PELVIC COMPLETE: CPT

## 2023-06-13 LAB — BACTERIA UR CULT: NORMAL

## 2023-06-14 ENCOUNTER — TELEPHONE (OUTPATIENT)
Dept: OBGYN CLINIC | Facility: CLINIC | Age: 33
End: 2023-06-14

## 2023-06-14 NOTE — TELEPHONE ENCOUNTER
Patient stated her lower left side is swollen and throbbing - she took pain meds to help relieve the pain but the pain has not got better - she asked to speak to a nurse

## 2023-06-14 NOTE — TELEPHONE ENCOUNTER
Patient had transvaginal ultrasound 6/12 and reports llq pain since, 5/10 on pain scale unrelieved with Motrin/Tyelnol  Hurts more when she stretches  Spotting continues daily on Norethindrone, started 3rd pack today  Also sees US results and thought had questions about polyp and fibroid  Thought they were removed during D&C in March  Will route to Dr Barry Wetzel to advise

## 2023-06-14 NOTE — TELEPHONE ENCOUNTER
Patient has cancelled and no showed for several appointments, will not be double booked   If we have an opening, we can use that spot

## 2023-06-14 NOTE — TELEPHONE ENCOUNTER
Hasn't seen me in some time and I did not order ultrasound but I reviewed  Polyp was removed, so this is a different one: see report and pathology  Fibroid was not removed, plan was not to remove   Probably sore from ultrasound, would continue what she is doing nirav

## 2023-06-14 NOTE — TELEPHONE ENCOUNTER
Spoke with patient  Patient would like to schedule rep-op appointment to discuss other surgical options such as TLH  Appt scheduled for 7/26 3 pm  Patient was hoping for sooner  Will route to scheduling to see if we can accommodate sooner  In the meantime, continue with Advil/Tylneol around the clock  If no improvement or worsening symptoms to call back for appt

## 2023-06-22 ENCOUNTER — TELEPHONE (OUTPATIENT)
Dept: OBGYN CLINIC | Facility: CLINIC | Age: 33
End: 2023-06-22

## 2023-06-30 DIAGNOSIS — R06.2 WHEEZING: ICD-10-CM

## 2023-06-30 RX ORDER — ALBUTEROL SULFATE 90 UG/1
AEROSOL, METERED RESPIRATORY (INHALATION)
Qty: 18 G | Refills: 1 | OUTPATIENT
Start: 2023-06-30

## 2023-07-03 ENCOUNTER — TELEPHONE (OUTPATIENT)
Dept: OBGYN CLINIC | Facility: CLINIC | Age: 33
End: 2023-07-03

## 2023-07-03 DIAGNOSIS — R06.2 WHEEZING: ICD-10-CM

## 2023-07-03 DIAGNOSIS — N93.9 ABNORMAL UTERINE BLEEDING (AUB): ICD-10-CM

## 2023-07-03 RX ORDER — ALBUTEROL SULFATE 90 UG/1
1 AEROSOL, METERED RESPIRATORY (INHALATION) EVERY 6 HOURS PRN
Qty: 18 G | Refills: 1 | Status: SHIPPED | OUTPATIENT
Start: 2023-07-03

## 2023-07-03 NOTE — TELEPHONE ENCOUNTER
Patient needs refill on bc - she stated that her provider told her to double up on the pills up until her hysterectomy - she stated she really needs her pills refilled or she will hemorrhage

## 2023-07-03 NOTE — TELEPHONE ENCOUNTER
per notes from 6/14 EC stated (Patient has cancelled and no showed for several appointments)     In May, 6 months were sent not sure why she needed a refill in June. I didn't see any note about doubling up.

## 2023-07-05 RX ORDER — NORETHINDRONE ACETATE AND ETHINYL ESTRADIOL 1MG-20(21)
1 KIT ORAL DAILY
Qty: 90 TABLET | Refills: 0 | Status: SHIPPED | OUTPATIENT
Start: 2023-07-05

## 2023-07-05 NOTE — TELEPHONE ENCOUNTER
I don't see this recommendation. She should not be doubling up on pills continuously. Doubling pills is something only done for a couple days to stop bleeding, but shouldn't be done routinely. Would recommend she take one tablet daily. Will send in refill until her next appt. Please let me know if she has any questions.

## 2023-07-26 ENCOUNTER — OFFICE VISIT (OUTPATIENT)
Dept: OBGYN CLINIC | Facility: CLINIC | Age: 33
End: 2023-07-26
Payer: COMMERCIAL

## 2023-07-26 VITALS
SYSTOLIC BLOOD PRESSURE: 114 MMHG | BODY MASS INDEX: 36.86 KG/M2 | DIASTOLIC BLOOD PRESSURE: 82 MMHG | HEIGHT: 63 IN | WEIGHT: 208 LBS

## 2023-07-26 DIAGNOSIS — N93.9 ABNORMAL UTERINE BLEEDING (AUB): ICD-10-CM

## 2023-07-26 PROCEDURE — 99214 OFFICE O/P EST MOD 30 MIN: CPT | Performed by: STUDENT IN AN ORGANIZED HEALTH CARE EDUCATION/TRAINING PROGRAM

## 2023-07-26 RX ORDER — NORETHINDRONE ACETATE AND ETHINYL ESTRADIOL 1MG-20(21)
1 KIT ORAL DAILY
Qty: 30 TABLET | Refills: 0 | Status: SHIPPED | OUTPATIENT
Start: 2023-07-26

## 2023-07-26 NOTE — PROGRESS NOTES
Assessment/Plan:     Problem List Items Addressed This Visit    None   - We have previously discussed management of AUB in detail. Reviewed counseling today. -Desires definitive management with total hysterectomy: we discussed risks, benefits, alternatives. We discussed risks including bleeding, infection, damage to surrounding organs (bowel, bladder), nerves and vessels, DVT/PE, very unlikely risk of death. Increased risk anesthesia, surgical complexity, with BMI 37. Increased risk of surgical complexity, injury to bladder, bowel, other organs, secondary to history 3  deliveries. Minimal risks with blood transfusion to include transfusion reaction, TRALI, rare transmission of HIV/Hepatitis C. Discussed bilateral salpingectomy and benefit of decreased risk of ovarian cancer, cystoscopy. Discussed plan to leave ovaries in situ. Benefits include removal cervical dysplasia, decreased risk ovarian cancer. Patient wishes to proceed. -Will plan to proceed with TLH/BS, cystoscopy. Consent form reviewed in detail with patient, signed by patient      Subjective:      Patient ID: Oly Stuart is a 35 y.o. female. HPI  She presents today for preop consultation for hysterectomy. She continues to have heavy, painful and frequent bleeding, despite D&C with polypectomy, treatment with OCPs. She desires definitive treatment at this time. The following portions of the patient's history were reviewed and updated as appropriate: allergies, current medications, past family history, past medical history, past social history, past surgical history and problem list.    Review of Systems  as above    Objective:  /82 (BP Location: Left arm, Patient Position: Sitting, Cuff Size: Standard)   Ht 5' 2.5" (1.588 m)   Wt 94.3 kg (208 lb)   BMI 37.44 kg/m²      Physical Exam  Vitals reviewed. Constitutional:       Appearance: She is well-developed. HENT:      Head: Normocephalic.    Cardiovascular:      Rate and Rhythm: Normal rate and regular rhythm. Pulmonary:      Effort: Pulmonary effort is normal.   Abdominal:      Palpations: Abdomen is soft. Musculoskeletal:         General: Normal range of motion. Cervical back: Normal range of motion. Skin:     General: Skin is warm and dry. Neurological:      Mental Status: She is alert and oriented to person, place, and time.    Psychiatric:         Behavior: Behavior normal.         Overall MDM: moderate   Diagnosis moderate, Data moderate, Risk low

## 2023-08-14 ENCOUNTER — TELEPHONE (OUTPATIENT)
Dept: OBGYN CLINIC | Facility: CLINIC | Age: 33
End: 2023-08-14

## 2023-08-14 NOTE — TELEPHONE ENCOUNTER
Tried to call patient to see why they will not cover the BCP- is it the ABNORMAL UTERINE BLEEDING diagnosis?

## 2023-08-14 NOTE — TELEPHONE ENCOUNTER
Patient says she has to wait 20 days to refill it-  She thinks she threw out last pack- she was told in wont cover.      Asked if EC can change dose or pill so they cover it- and cx the 7/26 script- told her I can call and maybe ins will do an override on this 1 pk - she said she thinks best/easier to change the pill    Surgery schedule for Sept.

## 2023-08-14 NOTE — TELEPHONE ENCOUNTER
Pt  lm on vm 10:10 am    Pt nees 1 month of birth ctl -is having surgery 9/1 with Dr. Yamini Campbell. Her insurance will not cover it-wants to know if we can reword it or change the dose so her insurance will pay. Please advise.

## 2023-08-15 NOTE — TELEPHONE ENCOUNTER
We can send it to [facility] or [facility] - rx is $4 there out of pocket.  She just needs to let us know which one

## 2023-08-15 NOTE — TELEPHONE ENCOUNTER
Those pharmacies are both too far form her for the $4  Will goto [facility] for $15  She will pick it up tomorrow. Has labwork to do also.

## 2023-08-16 ENCOUNTER — LAB REQUISITION (OUTPATIENT)
Dept: LAB | Facility: HOSPITAL | Age: 33
End: 2023-08-16
Payer: COMMERCIAL

## 2023-08-16 ENCOUNTER — APPOINTMENT (OUTPATIENT)
Dept: LAB | Facility: CLINIC | Age: 33
End: 2023-08-16
Payer: COMMERCIAL

## 2023-08-16 DIAGNOSIS — Z01.818 PRE-OP TESTING: ICD-10-CM

## 2023-08-16 DIAGNOSIS — Z01.818 ENCOUNTER FOR OTHER PREPROCEDURAL EXAMINATION: ICD-10-CM

## 2023-08-16 LAB
ABO GROUP BLD: NORMAL
ANION GAP SERPL CALCULATED.3IONS-SCNC: 5 MMOL/L
BLD GP AB SCN SERPL QL: NEGATIVE
BUN SERPL-MCNC: 11 MG/DL (ref 5–25)
CALCIUM SERPL-MCNC: 9.3 MG/DL (ref 8.3–10.1)
CHLORIDE SERPL-SCNC: 109 MMOL/L (ref 96–108)
CO2 SERPL-SCNC: 24 MMOL/L (ref 21–32)
CREAT SERPL-MCNC: 0.75 MG/DL (ref 0.6–1.3)
ERYTHROCYTE [DISTWIDTH] IN BLOOD BY AUTOMATED COUNT: 14.4 % (ref 11.6–15.1)
GFR SERPL CREATININE-BSD FRML MDRD: 105 ML/MIN/1.73SQ M
GLUCOSE P FAST SERPL-MCNC: 103 MG/DL (ref 65–99)
HCT VFR BLD AUTO: 37.5 % (ref 34.8–46.1)
HGB BLD-MCNC: 12.3 G/DL (ref 11.5–15.4)
MCH RBC QN AUTO: 27.8 PG (ref 26.8–34.3)
MCHC RBC AUTO-ENTMCNC: 32.8 G/DL (ref 31.4–37.4)
MCV RBC AUTO: 85 FL (ref 82–98)
PLATELET # BLD AUTO: 241 THOUSANDS/UL (ref 149–390)
PMV BLD AUTO: 10.7 FL (ref 8.9–12.7)
POTASSIUM SERPL-SCNC: 4.1 MMOL/L (ref 3.5–5.3)
RBC # BLD AUTO: 4.42 MILLION/UL (ref 3.81–5.12)
RH BLD: NEGATIVE
SODIUM SERPL-SCNC: 138 MMOL/L (ref 135–147)
SPECIMEN EXPIRATION DATE: NORMAL
WBC # BLD AUTO: 8.6 THOUSAND/UL (ref 4.31–10.16)

## 2023-08-16 PROCEDURE — 36415 COLL VENOUS BLD VENIPUNCTURE: CPT

## 2023-08-16 PROCEDURE — 86900 BLOOD TYPING SEROLOGIC ABO: CPT | Performed by: STUDENT IN AN ORGANIZED HEALTH CARE EDUCATION/TRAINING PROGRAM

## 2023-08-16 PROCEDURE — 86901 BLOOD TYPING SEROLOGIC RH(D): CPT | Performed by: STUDENT IN AN ORGANIZED HEALTH CARE EDUCATION/TRAINING PROGRAM

## 2023-08-16 PROCEDURE — 80048 BASIC METABOLIC PNL TOTAL CA: CPT

## 2023-08-16 PROCEDURE — 86850 RBC ANTIBODY SCREEN: CPT | Performed by: STUDENT IN AN ORGANIZED HEALTH CARE EDUCATION/TRAINING PROGRAM

## 2023-08-16 PROCEDURE — 85027 COMPLETE CBC AUTOMATED: CPT

## 2023-08-31 ENCOUNTER — ANESTHESIA EVENT (OUTPATIENT)
Dept: PERIOP | Facility: HOSPITAL | Age: 33
End: 2023-08-31
Payer: COMMERCIAL

## 2023-08-31 PROBLEM — E66.9 OBESITY (BMI 30-39.9): Status: ACTIVE | Noted: 2023-08-31

## 2023-08-31 PROCEDURE — NC001 PR NO CHARGE: Performed by: STUDENT IN AN ORGANIZED HEALTH CARE EDUCATION/TRAINING PROGRAM

## 2023-08-31 NOTE — H&P
H&P Exam - Gynecology   Anju Bush 35 y.o. female MRN: [id number]  Unit/Bed#:  Encounter: [id number]    Assessment/Plan   32yo with AUB, for TLH/BS  - ancef, flagyl for preop antibiotics  - plan for celebrex, tylenol, gabapentin preop for ERAS protocol  - outpatient procedure      History of Present Illness     HPI:  Anju Bush is a 35 y.o. female who presents for devinitive management of heavy, painful and frequent menstrual bleeding. Has undergone polypectomy without improvement. No improvement with OCPs. Review of Systems as above    Historical Information   Past Medical History:   Diagnosis Date   • Anemia     Anemia during pregnancy. • Anxiety    • Asthma    • Cellulitis of abdominal wall 10/01/2019   • Depression     Postpartum depression. • Kidney infection    • Kidney stone    • Miscarriage    • MRSA infection    • Postpartum depression 10/01/2019   • Preeclampsia in postpartum period 2019   • Rh incompatibility    • Right flank pain 2020   • Urinary tract infection    • Varicella     Pt said she had chicken pox twice when she was younger.      Past Surgical History:   Procedure Laterality Date   • ADENOIDECTOMY     •  SECTION     • DILATION AND CURETTAGE OF UTERUS     • ND  DELIVERY ONLY N/A 9/3/2019    Procedure:  SECTION () REPEAT;  Surgeon: Jessica Alexander DO;  Location: [facility];  Service: Obstetrics   • ND HYSTEROSCOPY BX ENDOMETRIUM&/POLYPC W/WO D&C N/A 3/2/2023    Procedure: DILATATION AND CURETTAGE (D&C) WITH HYSTEROSCOPY;  Surgeon: Arjun Paiz MD;  Location: [facility];  Service: Gynecology   • ND HYSTEROSCOPY BX ENDOMETRIUM&/POLYPC W/WO D&C N/A 3/2/2023    Procedure: POLYPECTOMY;  Surgeon: Arjun Paiz MD;  Location: [facility];  Service: Gynecology   • ND LIG/TRNSXJ FALOPIAN TUBE  DEL/ABDML SURG Bilateral 9/3/2019    Procedure: LIGATION/COAGULATION TUBAL;  Surgeon: Jessica Alexander DO;  Location: [facility]; Service: Obstetrics   • TONSILLECTOMY       OB/GYN History:   Family History   Problem Relation Age of Onset   • Diverticulitis Mother    • Heart disease Father    • Heart attack Father    • Stroke Father    • No Known Problems Daughter    • No Known Problems Son    • Diverticulitis Maternal Grandmother    • Diabetes Maternal Grandfather    • Aneurysm Maternal Grandfather    • No Known Problems Paternal Grandmother    • Heart disease Paternal Grandfather    • Heart attack Paternal Grandfather    • Heart disease Family    • Crohn's disease Paternal Aunt      Social History   Social History     Substance and Sexual Activity   Alcohol Use Yes   • Alcohol/week: 2.0 - 3.0 standard drinks of alcohol   • Types: 2 - 3 Cans of beer per week    Comment: 2x weekly-beer     Social History     Substance and Sexual Activity   Drug Use Yes   • Types: Marijuana    Comment: occasionally     Social History     Tobacco Use   Smoking Status Every Day   • Packs/day: 0.00   • Types: Cigarettes   Smokeless Tobacco Never   Tobacco Comments    A few cigarettes a day     E-Cigarette/Vaping   • E-Cigarette Use Never User      E-Cigarette/Vaping Substances       Meds/Allergies   all current active meds have been reviewed  Allergies   Allergen Reactions   • Apple - Food Allergy Anaphylaxis     Tolerates apple juice, takes bite without peel and tolerates   • Nuts - Food Allergy Other (See Comments)     "itchy throat and difficulty swallowing."  Eats walnuts, macadamia, almonds, brazil nuts when roasted   • Pineapple - Food Allergy Anaphylaxis   • Shellfish Allergy - Food Allergy Other (See Comments)     "itchy throat and difficulty swallowing."   • Shellfish-Derived Products - Food Allergy Anaphylaxis   • Other Hives and Rash     Black berries    • Raspberry - Food Allergy Hives and Rash   • Penicillins Rash       Objective   Vitals: not currently breastfeeding.     No intake or output data in the 24 hours ending 08/31/23 1137     Physical Exam  Constitutional:       Appearance: She is well-developed. HENT:      Head: Normocephalic. Cardiovascular:      Rate and Rhythm: Normal rate and regular rhythm. Pulmonary:      Effort: Pulmonary effort is normal.   Abdominal:      Palpations: Abdomen is soft. Musculoskeletal:         General: Normal range of motion. Cervical back: Normal range of motion. Skin:     General: Skin is warm and dry. Neurological:      Mental Status: She is alert and oriented to person, place, and time.    Psychiatric:         Behavior: Behavior normal.

## 2023-08-31 NOTE — ANESTHESIA PREPROCEDURE EVALUATION
Procedure:  TOTAL LAPAROSCOPIC HYSTERECTOMY BILATERAL SALPINGECTOMY (Bilateral: Abdomen)  CYSTOSCOPY (Bladder)    Relevant Problems   ANESTHESIA (within normal limits)   (-) History of anesthesia complications      CARDIO (within normal limits)      ENDO (within normal limits)      GI/HEPATIC (within normal limits)      /RENAL (within normal limits)      GYN   (-) Currently pregnant      HEMATOLOGY (within normal limits)      MUSCULOSKELETAL (within normal limits)      NEURO/PSYCH   (+) Depression with anxiety      PULMONARY   (+) Asthma      Genitourinary   (+) Abnormal uterine bleeding (AUB)      Other   (+) Obesity (BMI 30-39. 9)        Physical Exam    Airway    Mallampati score: II  TM Distance: >3 FB  Neck ROM: full     Dental   No notable dental hx     Cardiovascular  Rhythm: regular, Rate: normal, Cardiovascular exam normal    Pulmonary  Pulmonary exam normal Breath sounds clear to auscultation,     Other Findings        Anesthesia Plan  ASA Score- 2     Anesthesia Type- general with ASA Monitors. Additional Monitors:   Airway Plan: ETT. Plan Factors-Exercise tolerance (METS): >4 METS. Chart reviewed. EKG reviewed. Imaging results reviewed. Existing labs reviewed. Patient summary reviewed. Patient is not a current smoker. Patient instructed to abstain from smoking on day of procedure. Patient smoked on day of surgery. Induction- intravenous. Postoperative Plan- . Planned trial extubation    Informed Consent- Anesthetic plan and risks discussed with patient. I personally reviewed this patient with the CRNA. Discussed and agreed on the Anesthesia Plan with the CRNA. .        NPO and allergies verified. Patient completed pre-op carbohydrate drink at around 5:30am today. Urine pregnancy test negative today, 9/1/23.   Patient received celecoxib, gabapentin, and Tylenol preoperatively as ordered by the Gyne team.    Plan:  GETA    Risks and benefits of general anesthesia were discussed with the patient. Discussed risks of anesthesia including, but not limited to, the risk of dental injury, n/v, sore throat, corneal abrasions, and arrhythmias. All questions were answered. Anesthesia consent was obtained from the patient.

## 2023-09-01 ENCOUNTER — HOSPITAL ENCOUNTER (OUTPATIENT)
Facility: HOSPITAL | Age: 33
Setting detail: OUTPATIENT SURGERY
Discharge: HOME | End: 2023-09-01
Attending: STUDENT IN AN ORGANIZED HEALTH CARE EDUCATION/TRAINING PROGRAM | Admitting: STUDENT IN AN ORGANIZED HEALTH CARE EDUCATION/TRAINING PROGRAM
Payer: COMMERCIAL

## 2023-09-01 ENCOUNTER — ANESTHESIA (OUTPATIENT)
Dept: PERIOP | Facility: HOSPITAL | Age: 33
End: 2023-09-01
Payer: COMMERCIAL

## 2023-09-01 VITALS
RESPIRATION RATE: 16 BRPM | HEART RATE: 90 BPM | TEMPERATURE: 97.5 F | WEIGHT: 205 LBS | SYSTOLIC BLOOD PRESSURE: 138 MMHG | OXYGEN SATURATION: 98 % | DIASTOLIC BLOOD PRESSURE: 86 MMHG | BODY MASS INDEX: 38.71 KG/M2 | HEIGHT: 61 IN

## 2023-09-01 DIAGNOSIS — G89.18 POSTOPERATIVE PAIN: Primary | ICD-10-CM

## 2023-09-01 DIAGNOSIS — N93.9 ABNORMAL UTERINE BLEEDING (AUB): ICD-10-CM

## 2023-09-01 DIAGNOSIS — D21.9 FIBROIDS: ICD-10-CM

## 2023-09-01 LAB
EXT PREGNANCY TEST URINE: NEGATIVE
EXT. CONTROL: NORMAL

## 2023-09-01 PROCEDURE — 88307 TISSUE EXAM BY PATHOLOGIST: CPT | Performed by: PATHOLOGY

## 2023-09-01 PROCEDURE — 58571 TLH W/T/O 250 G OR LESS: CPT | Performed by: STUDENT IN AN ORGANIZED HEALTH CARE EDUCATION/TRAINING PROGRAM

## 2023-09-01 PROCEDURE — 81025 URINE PREGNANCY TEST: CPT | Performed by: STUDENT IN AN ORGANIZED HEALTH CARE EDUCATION/TRAINING PROGRAM

## 2023-09-01 RX ORDER — SODIUM CHLORIDE, SODIUM LACTATE, POTASSIUM CHLORIDE, CALCIUM CHLORIDE 600; 310; 30; 20 MG/100ML; MG/100ML; MG/100ML; MG/100ML
75 INJECTION, SOLUTION INTRAVENOUS CONTINUOUS
Status: DISCONTINUED | OUTPATIENT
Start: 2023-09-01 | End: 2023-09-01 | Stop reason: HOSPADM

## 2023-09-01 RX ORDER — FENTANYL CITRATE/PF 50 MCG/ML
50 SYRINGE (ML) INJECTION
Status: DISCONTINUED | OUTPATIENT
Start: 2023-09-01 | End: 2023-09-01 | Stop reason: HOSPADM

## 2023-09-01 RX ORDER — ROCURONIUM BROMIDE 10 MG/ML
INJECTION, SOLUTION INTRAVENOUS AS NEEDED
Status: DISCONTINUED | OUTPATIENT
Start: 2023-09-01 | End: 2023-09-01

## 2023-09-01 RX ORDER — MAGNESIUM HYDROXIDE 1200 MG/15ML
LIQUID ORAL AS NEEDED
Status: DISCONTINUED | OUTPATIENT
Start: 2023-09-01 | End: 2023-09-01 | Stop reason: HOSPADM

## 2023-09-01 RX ORDER — ONDANSETRON 2 MG/ML
4 INJECTION INTRAMUSCULAR; INTRAVENOUS ONCE AS NEEDED
Status: DISCONTINUED | OUTPATIENT
Start: 2023-09-01 | End: 2023-09-01 | Stop reason: HOSPADM

## 2023-09-01 RX ORDER — BUPIVACAINE HYDROCHLORIDE 2.5 MG/ML
INJECTION, SOLUTION EPIDURAL; INFILTRATION; INTRACAUDAL AS NEEDED
Status: DISCONTINUED | OUTPATIENT
Start: 2023-09-01 | End: 2023-09-01 | Stop reason: HOSPADM

## 2023-09-01 RX ORDER — OXYCODONE HYDROCHLORIDE 5 MG/1
10 TABLET ORAL EVERY 4 HOURS PRN
Status: DISCONTINUED | OUTPATIENT
Start: 2023-09-01 | End: 2023-09-01 | Stop reason: HOSPADM

## 2023-09-01 RX ORDER — FENTANYL CITRATE 50 UG/ML
INJECTION, SOLUTION INTRAMUSCULAR; INTRAVENOUS AS NEEDED
Status: DISCONTINUED | OUTPATIENT
Start: 2023-09-01 | End: 2023-09-01

## 2023-09-01 RX ORDER — ACETAMINOPHEN 325 MG/1
975 TABLET ORAL EVERY 6 HOURS PRN
Status: DISCONTINUED | OUTPATIENT
Start: 2023-09-01 | End: 2023-09-01 | Stop reason: HOSPADM

## 2023-09-01 RX ORDER — ALBUTEROL SULFATE 90 UG/1
AEROSOL, METERED RESPIRATORY (INHALATION) AS NEEDED
Status: DISCONTINUED | OUTPATIENT
Start: 2023-09-01 | End: 2023-09-01

## 2023-09-01 RX ORDER — PROPOFOL 10 MG/ML
INJECTION, EMULSION INTRAVENOUS AS NEEDED
Status: DISCONTINUED | OUTPATIENT
Start: 2023-09-01 | End: 2023-09-01

## 2023-09-01 RX ORDER — DEXAMETHASONE SODIUM PHOSPHATE 10 MG/ML
INJECTION, SOLUTION INTRAMUSCULAR; INTRAVENOUS AS NEEDED
Status: DISCONTINUED | OUTPATIENT
Start: 2023-09-01 | End: 2023-09-01

## 2023-09-01 RX ORDER — IBUPROFEN 800 MG/1
800 TABLET ORAL EVERY 8 HOURS PRN
Qty: 30 TABLET | Refills: 0
Start: 2023-09-01

## 2023-09-01 RX ORDER — SODIUM CHLORIDE 9 MG/ML
INJECTION, SOLUTION INTRAVENOUS CONTINUOUS PRN
Status: DISCONTINUED | OUTPATIENT
Start: 2023-09-01 | End: 2023-09-01

## 2023-09-01 RX ORDER — PROMETHAZINE HYDROCHLORIDE 25 MG/ML
6.25 INJECTION, SOLUTION INTRAMUSCULAR; INTRAVENOUS ONCE AS NEEDED
Status: DISCONTINUED | OUTPATIENT
Start: 2023-09-01 | End: 2023-09-01 | Stop reason: HOSPADM

## 2023-09-01 RX ORDER — SODIUM CHLORIDE, SODIUM LACTATE, POTASSIUM CHLORIDE, CALCIUM CHLORIDE 600; 310; 30; 20 MG/100ML; MG/100ML; MG/100ML; MG/100ML
100 INJECTION, SOLUTION INTRAVENOUS CONTINUOUS
Status: DISCONTINUED | OUTPATIENT
Start: 2023-09-01 | End: 2023-09-01 | Stop reason: HOSPADM

## 2023-09-01 RX ORDER — KETAMINE HCL IN NACL, ISO-OSM 100MG/10ML
SYRINGE (ML) INJECTION AS NEEDED
Status: DISCONTINUED | OUTPATIENT
Start: 2023-09-01 | End: 2023-09-01

## 2023-09-01 RX ORDER — OXYCODONE HYDROCHLORIDE 5 MG/1
5 TABLET ORAL EVERY 4 HOURS PRN
Status: DISCONTINUED | OUTPATIENT
Start: 2023-09-01 | End: 2023-09-01 | Stop reason: HOSPADM

## 2023-09-01 RX ORDER — OXYCODONE HYDROCHLORIDE 5 MG/1
5 TABLET ORAL EVERY 6 HOURS PRN
Qty: 5 TABLET | Refills: 0 | Status: SHIPPED | OUTPATIENT
Start: 2023-09-01

## 2023-09-01 RX ORDER — LORATADINE 10 MG/1
10 TABLET ORAL DAILY
COMMUNITY

## 2023-09-01 RX ORDER — GABAPENTIN 300 MG/1
300 CAPSULE ORAL ONCE
Status: COMPLETED | OUTPATIENT
Start: 2023-09-01 | End: 2023-09-01

## 2023-09-01 RX ORDER — HYDROMORPHONE HCL/PF 1 MG/ML
0.25 SYRINGE (ML) INJECTION
Status: DISCONTINUED | OUTPATIENT
Start: 2023-09-01 | End: 2023-09-01 | Stop reason: HOSPADM

## 2023-09-01 RX ORDER — ACETAMINOPHEN 325 MG/1
1000 TABLET ORAL ONCE
Status: COMPLETED | OUTPATIENT
Start: 2023-09-01 | End: 2023-09-01

## 2023-09-01 RX ORDER — HYDROMORPHONE HCL/PF 1 MG/ML
SYRINGE (ML) INJECTION AS NEEDED
Status: DISCONTINUED | OUTPATIENT
Start: 2023-09-01 | End: 2023-09-01

## 2023-09-01 RX ORDER — LIDOCAINE HYDROCHLORIDE 10 MG/ML
INJECTION, SOLUTION EPIDURAL; INFILTRATION; INTRACAUDAL; PERINEURAL AS NEEDED
Status: DISCONTINUED | OUTPATIENT
Start: 2023-09-01 | End: 2023-09-01

## 2023-09-01 RX ORDER — MIDAZOLAM HYDROCHLORIDE 2 MG/2ML
INJECTION, SOLUTION INTRAMUSCULAR; INTRAVENOUS AS NEEDED
Status: DISCONTINUED | OUTPATIENT
Start: 2023-09-01 | End: 2023-09-01

## 2023-09-01 RX ORDER — CELECOXIB 200 MG/1
200 CAPSULE ORAL ONCE
Status: COMPLETED | OUTPATIENT
Start: 2023-09-01 | End: 2023-09-01

## 2023-09-01 RX ORDER — ONDANSETRON 2 MG/ML
4 INJECTION INTRAMUSCULAR; INTRAVENOUS EVERY 6 HOURS PRN
Status: DISCONTINUED | OUTPATIENT
Start: 2023-09-01 | End: 2023-09-01 | Stop reason: HOSPADM

## 2023-09-01 RX ORDER — CEFAZOLIN SODIUM 2 G/50ML
2000 SOLUTION INTRAVENOUS ONCE
Status: COMPLETED | OUTPATIENT
Start: 2023-09-01 | End: 2023-09-01

## 2023-09-01 RX ORDER — ONDANSETRON 2 MG/ML
INJECTION INTRAMUSCULAR; INTRAVENOUS AS NEEDED
Status: DISCONTINUED | OUTPATIENT
Start: 2023-09-01 | End: 2023-09-01

## 2023-09-01 RX ORDER — METRONIDAZOLE 500 MG/100ML
500 INJECTION, SOLUTION INTRAVENOUS ONCE
Status: COMPLETED | OUTPATIENT
Start: 2023-09-01 | End: 2023-09-01

## 2023-09-01 RX ORDER — ACETAMINOPHEN 500 MG
1000 TABLET ORAL EVERY 8 HOURS PRN
Qty: 30 TABLET | Refills: 0 | Status: SHIPPED | OUTPATIENT
Start: 2023-09-01

## 2023-09-01 RX ORDER — LIDOCAINE HYDROCHLORIDE 10 MG/ML
0.5 INJECTION, SOLUTION EPIDURAL; INFILTRATION; INTRACAUDAL; PERINEURAL ONCE AS NEEDED
Status: DISCONTINUED | OUTPATIENT
Start: 2023-09-01 | End: 2023-09-01 | Stop reason: HOSPADM

## 2023-09-01 RX ORDER — SODIUM CHLORIDE, SODIUM LACTATE, POTASSIUM CHLORIDE, CALCIUM CHLORIDE 600; 310; 30; 20 MG/100ML; MG/100ML; MG/100ML; MG/100ML
INJECTION, SOLUTION INTRAVENOUS CONTINUOUS PRN
Status: DISCONTINUED | OUTPATIENT
Start: 2023-09-01 | End: 2023-09-01

## 2023-09-01 RX ADMIN — ALBUTEROL SULFATE 8 PUFF: 90 AEROSOL, METERED RESPIRATORY (INHALATION) at 08:15

## 2023-09-01 RX ADMIN — FENTANYL CITRATE 50 MCG: 50 INJECTION INTRAMUSCULAR; INTRAVENOUS at 10:59

## 2023-09-01 RX ADMIN — ROCURONIUM BROMIDE 20 MG: 10 INJECTION, SOLUTION INTRAVENOUS at 09:37

## 2023-09-01 RX ADMIN — DEXAMETHASONE SODIUM PHOSPHATE 10 MG: 10 INJECTION, SOLUTION INTRAMUSCULAR; INTRAVENOUS at 08:27

## 2023-09-01 RX ADMIN — FENTANYL CITRATE 50 MCG: 50 INJECTION INTRAMUSCULAR; INTRAVENOUS at 08:05

## 2023-09-01 RX ADMIN — GABAPENTIN 300 MG: 300 CAPSULE ORAL at 07:08

## 2023-09-01 RX ADMIN — ACETAMINOPHEN 975 MG: 325 TABLET, FILM COATED ORAL at 07:08

## 2023-09-01 RX ADMIN — PHENYLEPHRINE HYDROCHLORIDE 20 MCG/MIN: 10 INJECTION INTRAVENOUS at 08:15

## 2023-09-01 RX ADMIN — LIDOCAINE HYDROCHLORIDE 50 MG: 10 INJECTION, SOLUTION EPIDURAL; INFILTRATION; INTRACAUDAL; PERINEURAL at 08:05

## 2023-09-01 RX ADMIN — SODIUM CHLORIDE: 0.9 INJECTION, SOLUTION INTRAVENOUS at 08:14

## 2023-09-01 RX ADMIN — METRONIDAZOLE: 500 SOLUTION INTRAVENOUS at 08:25

## 2023-09-01 RX ADMIN — MIDAZOLAM 2 MG: 1 INJECTION INTRAMUSCULAR; INTRAVENOUS at 07:57

## 2023-09-01 RX ADMIN — ROCURONIUM BROMIDE 10 MG: 10 INJECTION, SOLUTION INTRAVENOUS at 09:14

## 2023-09-01 RX ADMIN — ACETAMINOPHEN 975 MG: 325 TABLET, FILM COATED ORAL at 13:45

## 2023-09-01 RX ADMIN — CEFAZOLIN SODIUM 2000 MG: 2 SOLUTION INTRAVENOUS at 08:11

## 2023-09-01 RX ADMIN — SODIUM CHLORIDE, SODIUM LACTATE, POTASSIUM CHLORIDE, AND CALCIUM CHLORIDE: .6; .31; .03; .02 INJECTION, SOLUTION INTRAVENOUS at 07:54

## 2023-09-01 RX ADMIN — FENTANYL CITRATE 50 MCG: 50 INJECTION INTRAMUSCULAR; INTRAVENOUS at 08:33

## 2023-09-01 RX ADMIN — ONDANSETRON 4 MG: 2 INJECTION INTRAMUSCULAR; INTRAVENOUS at 09:55

## 2023-09-01 RX ADMIN — OXYCODONE HYDROCHLORIDE 5 MG: 5 TABLET ORAL at 14:40

## 2023-09-01 RX ADMIN — HYDROMORPHONE HYDROCHLORIDE 0.25 MG: 1 INJECTION, SOLUTION INTRAMUSCULAR; INTRAVENOUS; SUBCUTANEOUS at 11:20

## 2023-09-01 RX ADMIN — Medication 10 MG: at 09:14

## 2023-09-01 RX ADMIN — ROCURONIUM BROMIDE 50 MG: 10 INJECTION, SOLUTION INTRAVENOUS at 08:05

## 2023-09-01 RX ADMIN — SUGAMMADEX 200 MG: 100 INJECTION, SOLUTION INTRAVENOUS at 10:09

## 2023-09-01 RX ADMIN — PROPOFOL 200 MG: 10 INJECTION, EMULSION INTRAVENOUS at 08:05

## 2023-09-01 RX ADMIN — Medication 30 MG: at 08:27

## 2023-09-01 RX ADMIN — SODIUM CHLORIDE, SODIUM LACTATE, POTASSIUM CHLORIDE, AND CALCIUM CHLORIDE 75 ML/HR: .6; .31; .03; .02 INJECTION, SOLUTION INTRAVENOUS at 11:17

## 2023-09-01 RX ADMIN — HYDROMORPHONE HYDROCHLORIDE 0.5 MG: 1 INJECTION, SOLUTION INTRAMUSCULAR; INTRAVENOUS; SUBCUTANEOUS at 09:28

## 2023-09-01 RX ADMIN — FENTANYL CITRATE 50 MCG: 50 INJECTION INTRAMUSCULAR; INTRAVENOUS at 10:38

## 2023-09-01 RX ADMIN — CELECOXIB 200 MG: 200 CAPSULE ORAL at 07:08

## 2023-09-01 RX ADMIN — Medication 10 MG: at 09:35

## 2023-09-01 RX ADMIN — HYDROMORPHONE HYDROCHLORIDE 0.25 MG: 1 INJECTION, SOLUTION INTRAMUSCULAR; INTRAVENOUS; SUBCUTANEOUS at 11:29

## 2023-09-01 RX ADMIN — ROCURONIUM BROMIDE 20 MG: 10 INJECTION, SOLUTION INTRAVENOUS at 08:36

## 2023-09-01 NOTE — LETTER
[address] [address]  Dept: [phone]    September 1, 2023     Patient: Marie Isaacs   YOB: 1990   Date of Visit: 9/1/2023       To Whom it May Concern:    Marie Isaacs is under my professional care. She was seen in the hospital from 9/1/2023 to 09/01/23. She may return to work on 10/16/23 without limitations. If you have any questions or concerns, please don't hesitate to call.          Sincerely,          Jacklyn Best MD

## 2023-09-01 NOTE — INTERVAL H&P NOTE
H&P reviewed. After examining the patient I find no changes in the patients condition since the H&P had been written.     Vitals:    09/01/23 0641   BP: 131/91   Pulse: 89   Resp: 18   Temp: 97.8 °F (36.6 °C)   SpO2: 98%

## 2023-09-01 NOTE — ANESTHESIA POSTPROCEDURE EVALUATION
Post-Op Assessment Note    CV Status:  Stable    Pain management: adequate     Mental Status:  Alert and awake   Hydration Status:  Euvolemic   PONV Controlled:  Controlled   Airway Patency:  Patent      Post Op Vitals Reviewed: Yes      Staff: Anesthesiologist, CRNA         No notable events documented.     BP   127/93   Temp      Pulse  100   Resp   16   SpO2   100

## 2023-09-01 NOTE — OP NOTE
OPERATIVE REPORT  PATIENT NAME: Jo Ann Casas    :  1990  MRN: [id number]  Pt Location:  OR ROOM 07    SURGERY DATE: 2023    Surgeon(s) and Role:     * Angela Jimenez MD - Primary     * Benitez Dahl MD - Assisting    Preop Diagnosis:  Abnormal uterine bleeding (AUB) [N93.9]  Fibroids [D21.9]    Post-Op Diagnosis Codes:     * Abnormal uterine bleeding (AUB) [N93.9]     * Fibroids [D21.9]    Procedure(s):  Bilateral - TOTAL LAPAROSCOPIC HYSTERECTOMY AND LEFT SALPINGECTOMY  CYSTOSCOPY  LYSIS ADHESIONS    Specimen(s):  ID Type Source Tests Collected by Time Destination   1 : uterus with left fallopian tube Tissue Uterus TISSUE EXAM Angela Jimenez MD 2023 [id number]        Estimated Blood Loss: 250 mL    Drains:  [REMOVED] Urethral Catheter Non-latex 16 Fr. (Removed)   Number of days: 0       Anesthesia Type:   General    Operative Indications:  Abnormal uterine bleeding (AUB) [N93.9]  Fibroids [D21.9]    Operative Findings:  NEFG, multiparous cervix, uterus sounded to 10 cm  Normal liver edge and stomach. Omental adhesions to anterior abdominal wall, dense vesicouterine adhesions  Bulbous uterus, surgically absent right fallopian tube, partial remnant of left fallopian tube. Normal ovaries bilaterally  Bladder intact with brisk jets from bilateral ureteral orifices    Complications:   None    Procedure and Technique:  Brief History     Risks, benefits, and alternatives to the procedure were discussed with the patient and she had the opportunity to ask questions. Informed consent was previously obtained, but reviewed. Description of Procedure     Patient was taken to the operating room where a time out was performed to confirm correct patient and correct procedure. 2 grams of Ancef and 500 mg flagyl were given for infection prophylaxis. General endotracheal anesthesia (GET) was administered and the patient was positioned on the OR table in the dorsal lithotomy position.  All pressure points were padded and a Dawit hugger was placed to maintain control of core body temperature. A bimanual exam was performed, with the above findings, notably an enlarged and globular uterus with no palpable adnexal masses or fullness. The patient was prepped and draped in the usual sterile fashion with chloroprep on the abdomen, chlorhexidine for the vagina and perineum. A rai catheter was placed to drainage. Operative Technique     Speculum was placed in the vagina and the anterior lip of the cervix was grasped with an Allis clamp for traction. The uterus was sounded to 10cm. The cervix was then sequentially dilated to accommodate a Milagros uterine manipulator which was placed without difficulty. All other instruments were removed from the vagina. Attention was then turned to the abdomen for laparoscopy. Renetta clamp was used to invert the umbilicus for accessibility. The base of the umbilicus was grasped and everted with penetrating towel clamps. The veres needle was introduced, under low flow, with entry into the abdominal cavity confirmed with a drop in pressure. The abdomen was insufflated to 15 mmHg, after which a 10 mm incision was made at the base of the umbilicus. A 10mm trochar was introduced under direct visualization and without difficulty. The area underlying the Veres entry was examined, with no damage to the bowel or overlying omentum noted. Subsequently, the entire abdomen and pelvis was inspected and there was no evidence of injury to bowel, bladder, vasculature, or other structures. Liver edge and stomach were noted to be normal.      The patient was placed in Trendelenburg position and attention was then turned to the pelvis. The uterus was elevated to visualize the fallopian tubes and ovaries, as well as the cul de sac. Findings were as noted above. Notably, anatomy was normal with vesicouterine and abdominal omental adhesions.  Remnants of left fallopian tube noted. Additional 5mm trochars was introduced in the right and left lower abdomen approximately 2cm superior and medial to the iliac crests, under direct visualization, after the injection of marcaine. An additional suprapubic port was subsequently placed in the same manner. The omental adhesions were divided with the Harmonic scalpel, taking care that there was no bowel within the adhesions. The portion of the left fallopian tube was grasped with a blunt grasper and elevated to visualize the mesosalpinx. The Harmonic device was used to ligate along the mesosalpinx, working proximally and taking care to avoid ovarian vasculature. The left utero-ovarian ligament was identified and transected with the Harmonic scalpel. The round ligament and broad ligament were divided. Attention was turned to the anterior uterus and lysis of adhesions was performed in order to dissect the bladder flap developed off the lower uterine segment. Subsequently, the same was performed on the right side. The left uterine vessel was skeletonized and coagulated with the [employer] bipolar device. The same was performed on the right side. The uterus was noted to be appropriately blanched, so the Harmonic scalpel was then used to coagulate and transect the uterine arteries bilaterally. The edge of the uterine manipulator was identified, and the bovie spatula was used to incise circumferentially along the cervicovaginal junction to separate the uterus and cervix from the vagina. A tenaculum was introduced vaginally under direct laparoscopic visualization to remove the specimen. The specimen was removed, passed off, and submitted to pathology for analysis. Balloon was used to maintain insufflation. The vaginal cuff was closed laparoscopically with 2-0 barbed suture in a continuous running fashion. The pelvis was suction-irrigated with excellent hemostasis noted.  Laparoscopic inspection of the vaginal cuff and bilateral pedicles under low pressure also revealed excellent hemostasis. Balloon deflated and vaginal exam revealed cuff was intact and without defects. The patient was taken out of Trendelenberg. All instruments were removed from abdomen, and the pneumoperitoneum was released before removing the ports. The rai catheter was removed and cystoscopy was performed. Survey revealed an intact bladder without any defects or stitches. Bilateral ureteral jets noted. The bladder was drained. The umbilical fascia was closed with 0-vicryl. The skin incisions were closed with 4-0 monocryl in an interrupted fashion. Surgical glue was applied to the skin incisions. All counts were correct times two. The patient tolerated the procedure well, and was transferred to the recovery room in stable condition. I was present for the entire procedure.     Patient Disposition:  PACU  and extubated and stable    This procedure was not performed to treat colon cancer through resection      SIGNATURE: Jen Flowers MD  DATE: September 1, 2023  TIME: 10:09 AM

## 2023-09-07 ENCOUNTER — TELEPHONE (OUTPATIENT)
Dept: OBGYN CLINIC | Facility: CLINIC | Age: 33
End: 2023-09-07

## 2023-09-07 PROCEDURE — 88307 TISSUE EXAM BY PATHOLOGIST: CPT | Performed by: PATHOLOGY

## 2023-09-22 DIAGNOSIS — R06.2 WHEEZING: ICD-10-CM

## 2023-09-22 RX ORDER — MONTELUKAST SODIUM 10 MG/1
TABLET ORAL
Qty: 90 TABLET | Refills: 1 | OUTPATIENT
Start: 2023-09-22

## 2023-09-26 ENCOUNTER — TELEPHONE (OUTPATIENT)
Dept: OBGYN CLINIC | Facility: CLINIC | Age: 33
End: 2023-09-26

## 2023-09-26 ENCOUNTER — TELEPHONE (OUTPATIENT)
Age: 33
End: 2023-09-26

## 2023-09-26 NOTE — TELEPHONE ENCOUNTER
Pt calling regarding her surgery 9/1 with [facility] and really needs to be released to go back to work,  Per pt she is asking for light duty to be on,  Please call pt to advise,  Thanks

## 2023-09-26 NOTE — TELEPHONE ENCOUNTER
I spoke to patient in regards to scheduling 6wk post-op from hysterectomy on 9/1. Patient states that at this time she does not have the funds or transportation to come into the office and will call office back to schedule when she is available. Patient is feeling very good since procedure and does not have any concerns at this time please advise

## 2023-09-26 NOTE — LETTER
September 26, 2023    Marie Isaacs  [address] [address]                To Whom It May Concern:    Please be advised that the above named patient is under our professional care. She is able to return to work on light duty, no heavy lifting. If you have any questions please contact our office at [phone]. Thank you.       Sincerely,    [address]

## 2024-02-20 ENCOUNTER — OFFICE VISIT (OUTPATIENT)
Dept: URGENT CARE | Facility: CLINIC | Age: 34
End: 2024-02-20
Payer: COMMERCIAL

## 2024-02-20 VITALS
SYSTOLIC BLOOD PRESSURE: 141 MMHG | OXYGEN SATURATION: 99 % | HEART RATE: 86 BPM | DIASTOLIC BLOOD PRESSURE: 74 MMHG | TEMPERATURE: 97.5 F | RESPIRATION RATE: 18 BRPM

## 2024-02-20 DIAGNOSIS — J45.21 MILD INTERMITTENT ASTHMA WITH ACUTE EXACERBATION: ICD-10-CM

## 2024-02-20 DIAGNOSIS — J01.90 ACUTE SINUSITIS, RECURRENCE NOT SPECIFIED, UNSPECIFIED LOCATION: Primary | ICD-10-CM

## 2024-02-20 PROCEDURE — 99213 OFFICE O/P EST LOW 20 MIN: CPT

## 2024-02-20 RX ORDER — DOXYCYCLINE 100 MG/1
100 TABLET ORAL 2 TIMES DAILY
Qty: 14 TABLET | Refills: 0 | Status: SHIPPED | OUTPATIENT
Start: 2024-02-20 | End: 2024-02-27

## 2024-02-20 RX ORDER — FLUCONAZOLE 150 MG/1
150 TABLET ORAL ONCE
Qty: 1 TABLET | Refills: 0 | Status: SHIPPED | OUTPATIENT
Start: 2024-02-20 | End: 2024-02-20

## 2024-02-21 NOTE — PROGRESS NOTES
[facility]        NAME: Marquita Person is a 34 y.o. female  : 1990    MRN: [id number]  DATE: 2024  TIME: 7:39 PM    Assessment and Plan   Acute sinusitis, recurrence not specified, unspecified location [J01.90]  1. Acute sinusitis, recurrence not specified, unspecified location  fluconazole (DIFLUCAN) 150 mg tablet    doxycycline (ADOXA) 100 MG tablet      2. Mild intermittent asthma with acute exacerbation          Nebulizer given in office today, has albuterol for it at home but machine is broken    Patient Instructions     Supportive care with rest, adequate hydration, and warm liquids   Over the counter Tylenol/acetaminophen as needed for fever  Over the counter cold/cough medicine as needed  Doxycycline as prescribed   Albuterol nebulizer as needed for wheezing     Follow up with PCP in 3-5 days   Go to ER if worsening symptoms    Chief Complaint     Chief Complaint   Patient presents with    sinus congestion     Patient with sinus congestion for about a month. Thought it was allergies, allergy meds are not effective. Started using tylenol cold and sinus and little relief. Mucous production is brown. Covid test negative.          History of Present Illness       Sinusitis  This is a new problem. The current episode started 1 to 4 weeks ago. The problem has been gradually worsening since onset. There has been no fever. She is experiencing no pain. Associated symptoms include congestion, a hoarse voice and sinus pressure. Pertinent negatives include no chills, coughing, ear pain, shortness of breath, sneezing or sore throat. Past treatments include acetaminophen, nasal decongestants and saline nose sprays. The treatment provided mild relief.       Review of Systems   Review of Systems   Constitutional:  Negative for chills.   HENT:  Positive for congestion, hoarse voice and sinus pressure. Negative for ear pain, nosebleeds, postnasal drip, rhinorrhea, sneezing and sore throat.     Respiratory:  Negative for cough, chest tightness and shortness of breath.    Cardiovascular:  Negative for chest pain and palpitations.   Gastrointestinal:  Negative for abdominal pain, nausea and vomiting.         Current Medications       Current Outpatient Medications:     acetaminophen (TYLENOL) 500 mg tablet, Take 2 tablets (1,000 mg total) by mouth every 8 (eight) hours as needed for mild pain, Disp: 30 tablet, Rfl: 0    doxycycline (ADOXA) 100 MG tablet, Take 1 tablet (100 mg total) by mouth 2 (two) times a day for 7 days, Disp: 14 tablet, Rfl: 0    fluconazole (DIFLUCAN) 150 mg tablet, Take 1 tablet (150 mg total) by mouth once for 1 dose, Disp: 1 tablet, Rfl: 0    ibuprofen (MOTRIN) 600 mg tablet, Take 1 tablet (600 mg total) by mouth every 6 (six) hours as needed for mild pain, moderate pain, fever or headaches (cramping), Disp: 60 tablet, Rfl: 3    montelukast (SINGULAIR) 10 mg tablet, take 1 tablet by mouth at bedtime, Disp: 90 tablet, Rfl: 1    Multiple Vitamins-Minerals (Emergen-C Immune) PACK, Take by mouth, Disp: , Rfl:     albuterol (2.5 mg/3 mL) 0.083 % nebulizer solution, Take 1 vial (2.5 mg total) by nebulization every 6 (six) hours as needed for wheezing or shortness of breath (Patient not taking: Reported on 2/20/2024), Disp: 60 vial, Rfl: 1    b complex vitamins capsule, Take 1 capsule by mouth if needed Not daily (Patient not taking: Reported on 2/20/2024), Disp: , Rfl:     ibuprofen (MOTRIN) 800 mg tablet, Take 1 tablet (800 mg total) by mouth every 8 (eight) hours as needed for mild pain (Patient not taking: Reported on 2/20/2024), Disp: 30 tablet, Rfl: 0    loratadine (CLARITIN) 10 mg tablet, Take 10 mg by mouth daily (Patient not taking: Reported on 2/20/2024), Disp: , Rfl:     norethindrone-ethinyl estradiol (Loestrin Fe 1/20) 1-20 MG-MCG per tablet, Take 1 tablet by mouth daily (Patient not taking: Reported on 2/20/2024), Disp: 30 tablet, Rfl: 0    oxyCODONE (ROXICODONE) 5 immediate  release tablet, Take 1 tablet (5 mg total) by mouth every 6 (six) hours as needed for severe pain for up to 5 doses Max Daily Amount: 20 mg (Patient not taking: Reported on 2024), Disp: 5 tablet, Rfl: 0    Current Allergies     Allergies as of 2024 - Reviewed 2024   Allergen Reaction Noted    Apple - food allergy Anaphylaxis 2017    Nuts - food allergy Other (See Comments) 2017    Pineapple - food allergy Anaphylaxis 2017    Shellfish allergy - food allergy Other (See Comments) 2017    Shellfish-derived products - food allergy Anaphylaxis 2017    Other Hives and Rash 2017    Raspberry - food allergy Hives and Rash 2017    Penicillins Rash 10/06/2013            The following portions of the patient's history were reviewed and updated as appropriate: allergies, current medications, past family history, past medical history, past social history, past surgical history and problem list.     Past Medical History:   Diagnosis Date    Anemia     Anemia during pregnancy.    Anxiety     Asthma     Cellulitis of abdominal wall 10/01/2019    Depression     Postpartum depression.    Kidney infection     Kidney stone     Miscarriage     MRSA infection     Postpartum depression 10/01/2019    Preeclampsia in postpartum period 2019    Rh incompatibility     Right flank pain 2020    Urinary tract infection     Varicella     Pt said she had chicken pox twice when she was younger.       Past Surgical History:   Procedure Laterality Date    ABDOMINAL ADHESION SURGERY N/A 2023    Procedure: LYSIS ADHESIONS;  Surgeon: Flora Gray MD;  Location: [facility];  Service: Gynecology    ADENOIDECTOMY       SECTION      DILATION AND CURETTAGE OF UTERUS      OH  DELIVERY ONLY N/A 9/3/2019    Procedure:  SECTION () REPEAT;  Surgeon: Rosa Ordonez DO;  Location: [facility];  Service: Obstetrics    OH CYSTOURETHROSCOPY N/A 2023     Procedure: CYSTOSCOPY;  Surgeon: Flora Gray MD;  [facility]: [facility];  Service: Gynecology    ND HYSTEROSCOPY BX ENDOMETRIUM&/POLYPC W/WO D&C N/A 3/2/2023    Procedure: DILATATION AND CURETTAGE (D&C) WITH HYSTEROSCOPY;  Surgeon: Flora Gray MD;  [facility]: [facility];  Service: Gynecology    ND HYSTEROSCOPY BX ENDOMETRIUM&/POLYPC W/WO D&C N/A 3/2/2023    Procedure: POLYPECTOMY;  Surgeon: Flora Gray MD;  [facility]: [facility];  Service: Gynecology    ND LAPS TOTAL HYSTERECT 250 GM/< W/RMVL TUBE/OVARY Bilateral 2023    Procedure: TOTAL LAPAROSCOPIC HYSTERECTOMY AND LEFT SALPINGECTOMY;  Surgeon: Flora Gray MD;  [facility]: [facility];  Service: Gynecology    ND LIG/TRNSXJ FALOPIAN TUBE  DEL/ABDML SURG Bilateral 9/3/2019    Procedure: LIGATION/COAGULATION TUBAL;  Surgeon: Rosa Ordonez DO;  Location: [facility];  Service: Obstetrics    TONSILLECTOMY         Family History   Problem Relation Age of Onset    Diverticulitis Mother     Heart disease Father     Heart attack Father     Stroke Father     No Known Problems Daughter     No Known Problems Son     Diverticulitis Maternal Grandmother     Diabetes Maternal Grandfather     Aneurysm Maternal Grandfather     No Known Problems Paternal Grandmother     Heart disease Paternal Grandfather     Heart attack Paternal Grandfather     Heart disease Family     Crohn's disease Paternal Aunt          Medications have been verified.        Objective   /74   Pulse 86   Temp 97.5 °F (36.4 °C)   Resp 18   LMP  (LMP Unknown) Comment: has been bleeding since 2023  SpO2 99%        Physical Exam     Physical Exam  Constitutional:       General: She is not in acute distress.     Appearance: She is not diaphoretic.   HENT:      Head: Normocephalic.      Right Ear: Tympanic membrane normal.      Left Ear: Tympanic membrane normal.      Nose: Congestion present. No rhinorrhea.      Mouth/Throat:      Mouth: Mucous membranes are moist.       Pharynx: No oropharyngeal exudate or posterior oropharyngeal erythema.   Eyes:      Pupils: Pupils are equal, round, and reactive to light.   Cardiovascular:      Rate and Rhythm: Normal rate and regular rhythm.      Pulses: Normal pulses.      Heart sounds: Normal heart sounds. No murmur heard.     No gallop.   Pulmonary:      Effort: Pulmonary effort is normal. No respiratory distress.      Breath sounds: Normal breath sounds. No wheezing or rales.   Abdominal:      General: Abdomen is flat. Bowel sounds are normal.      Palpations: Abdomen is soft.   Musculoskeletal:         General: Normal range of motion.      Cervical back: Normal range of motion.   Skin:     General: Skin is warm and dry.      Capillary Refill: Capillary refill takes less than 2 seconds.   Neurological:      Mental Status: She is alert and oriented to person, place, and time.

## 2024-02-21 NOTE — PATIENT INSTRUCTIONS
Supportive care with rest, adequate hydration, and warm liquids   Over the counter Tylenol/acetaminophen as needed for fever  Over the counter cold/cough medicine as needed  Doxycycline as prescribed   Albuterol nebulizer as needed for wheezing     Follow up with PCP in 3-5 days   Go to ER if worsening symptoms

## 2024-03-06 ENCOUNTER — TELEPHONE (OUTPATIENT)
Dept: FAMILY MEDICINE CLINIC | Facility: CLINIC | Age: 34
End: 2024-03-06

## 2024-03-06 NOTE — TELEPHONE ENCOUNTER
Pt called wanting a virtual asap.. Marilu has availability Friday..  Pt has a rash on her arms.. is not sure what it is..   She has no transportation .. Is concerned that it might be scabies.. She can also send pictures.. she was last seen 11/11/21.  Please advise.

## 2024-03-27 ENCOUNTER — HOSPITAL ENCOUNTER (EMERGENCY)
Facility: HOSPITAL | Age: 34
Discharge: HOME | End: 2024-03-27
Attending: STUDENT IN AN ORGANIZED HEALTH CARE EDUCATION/TRAINING PROGRAM | Admitting: STUDENT IN AN ORGANIZED HEALTH CARE EDUCATION/TRAINING PROGRAM
Payer: COMMERCIAL

## 2024-03-27 VITALS
OXYGEN SATURATION: 98 % | TEMPERATURE: 98.5 F | DIASTOLIC BLOOD PRESSURE: 93 MMHG | HEART RATE: 92 BPM | RESPIRATION RATE: 22 BRPM | SYSTOLIC BLOOD PRESSURE: 168 MMHG

## 2024-03-27 DIAGNOSIS — J10.1 INFLUENZA B: Primary | ICD-10-CM

## 2024-03-27 LAB
ALBUMIN SERPL BCP-MCNC: 3.7 G/DL (ref 3.5–5)
ALP SERPL-CCNC: 49 U/L (ref 34–104)
ALT SERPL W P-5'-P-CCNC: 29 U/L (ref 7–52)
ANION GAP SERPL CALCULATED.3IONS-SCNC: 9 MMOL/L (ref 4–13)
AST SERPL W P-5'-P-CCNC: 36 U/L (ref 13–39)
BASOPHILS # BLD AUTO: 0.02 THOUSANDS/ÂΜL (ref 0–0.1)
BASOPHILS NFR BLD AUTO: 0 % (ref 0–1)
BILIRUB SERPL-MCNC: 0.49 MG/DL (ref 0.2–1)
BUN SERPL-MCNC: 5 MG/DL (ref 5–25)
CALCIUM SERPL-MCNC: 8.1 MG/DL (ref 8.4–10.2)
CHLORIDE SERPL-SCNC: 106 MMOL/L (ref 96–108)
CO2 SERPL-SCNC: 21 MMOL/L (ref 21–32)
CREAT SERPL-MCNC: 0.51 MG/DL (ref 0.6–1.3)
EOSINOPHIL # BLD AUTO: 0.03 THOUSAND/ÂΜL (ref 0–0.61)
EOSINOPHIL NFR BLD AUTO: 0 % (ref 0–6)
ERYTHROCYTE [DISTWIDTH] IN BLOOD BY AUTOMATED COUNT: 12.7 % (ref 11.6–15.1)
FLUAV RNA RESP QL NAA+PROBE: NEGATIVE
FLUBV RNA RESP QL NAA+PROBE: POSITIVE
GFR SERPL CREATININE-BSD FRML MDRD: 125 ML/MIN/1.73SQ M
GLUCOSE SERPL-MCNC: 85 MG/DL (ref 65–140)
HCT VFR BLD AUTO: 35.8 % (ref 34.8–46.1)
HGB BLD-MCNC: 12.1 G/DL (ref 11.5–15.4)
IMM GRANULOCYTES # BLD AUTO: 0.03 THOUSAND/UL (ref 0–0.2)
IMM GRANULOCYTES NFR BLD AUTO: 0 % (ref 0–2)
LIPASE SERPL-CCNC: 14 U/L (ref 11–82)
LYMPHOCYTES # BLD AUTO: 1.55 THOUSANDS/ÂΜL (ref 0.6–4.47)
LYMPHOCYTES NFR BLD AUTO: 16 % (ref 14–44)
MCH RBC QN AUTO: 28.9 PG (ref 26.8–34.3)
MCHC RBC AUTO-ENTMCNC: 33.8 G/DL (ref 31.4–37.4)
MCV RBC AUTO: 85 FL (ref 82–98)
MONOCYTES # BLD AUTO: 0.76 THOUSAND/ÂΜL (ref 0.17–1.22)
MONOCYTES NFR BLD AUTO: 8 % (ref 4–12)
NEUTROPHILS # BLD AUTO: 7.28 THOUSANDS/ÂΜL (ref 1.85–7.62)
NEUTS SEG NFR BLD AUTO: 76 % (ref 43–75)
NRBC BLD AUTO-RTO: 0 /100 WBCS
PLATELET # BLD AUTO: 142 THOUSANDS/UL (ref 149–390)
PMV BLD AUTO: 10.2 FL (ref 8.9–12.7)
POTASSIUM SERPL-SCNC: 4.4 MMOL/L (ref 3.5–5.3)
PROT SERPL-MCNC: 6.6 G/DL (ref 6.4–8.4)
RBC # BLD AUTO: 4.19 MILLION/UL (ref 3.81–5.12)
RSV RNA RESP QL NAA+PROBE: NEGATIVE
SARS-COV-2 RNA RESP QL NAA+PROBE: NEGATIVE
SODIUM SERPL-SCNC: 136 MMOL/L (ref 135–147)
WBC # BLD AUTO: 9.67 THOUSAND/UL (ref 4.31–10.16)

## 2024-03-27 PROCEDURE — 85025 COMPLETE CBC W/AUTO DIFF WBC: CPT

## 2024-03-27 PROCEDURE — 96361 HYDRATE IV INFUSION ADD-ON: CPT

## 2024-03-27 PROCEDURE — 0241U HB NFCT DS VIR RESP RNA 4 TRGT: CPT

## 2024-03-27 PROCEDURE — 96374 THER/PROPH/DIAG INJ IV PUSH: CPT

## 2024-03-27 PROCEDURE — 80053 COMPREHEN METABOLIC PANEL: CPT

## 2024-03-27 PROCEDURE — 99283 EMERGENCY DEPT VISIT LOW MDM: CPT

## 2024-03-27 PROCEDURE — 36415 COLL VENOUS BLD VENIPUNCTURE: CPT

## 2024-03-27 PROCEDURE — 83690 ASSAY OF LIPASE: CPT

## 2024-03-27 PROCEDURE — 99284 EMERGENCY DEPT VISIT MOD MDM: CPT

## 2024-03-27 RX ORDER — ONDANSETRON 2 MG/ML
4 INJECTION INTRAMUSCULAR; INTRAVENOUS ONCE
Status: COMPLETED | OUTPATIENT
Start: 2024-03-27 | End: 2024-03-27

## 2024-03-27 RX ADMIN — SODIUM CHLORIDE 1000 ML: 0.9 INJECTION, SOLUTION INTRAVENOUS at 21:08

## 2024-03-27 RX ADMIN — ONDANSETRON 4 MG: 2 INJECTION INTRAMUSCULAR; INTRAVENOUS at 21:15

## 2024-03-28 NOTE — ED NOTES
Patient states she has to leave due to an issue at home, requested IV be removes. Provider aware     Madalyn Pittman  03/27/24 [id number]

## 2024-03-28 NOTE — ED PROVIDER NOTES
History  Chief Complaint   Patient presents with    Flank Pain     Patient reports to be experiencing lower back/flank pain for 3x days. Patient reports that they are experiencing NVD. Patient states that they cannot bear weight standfing up for too long. Has Hx of kidney stones.     The patient is a 34 y.o. female with a history of EMEA, anxiety, asthma, kidney infection, kidney stone, postpartum depression, preeclampsia, right flank pain who presents to [facility] with a chief complaint of bilateral flank pain. Symptoms began 3 days ago and have been constant since onset. Her pain is currently rated as a 5/10 in severity and described as achey without radiation. Associated symptoms include nausea, vomiting, diarrhea, cough, congestion, subjective fever. Symptoms are aggravated with movement and oral intake and alleviating factors include none noted. The patient denies chest pain, shortness of breath, wheezing, sputum, dysuria, hematuria, frequency, urgency, chest pain, melena, hematemesis, syncope, headache, lightheadedness, dizziness, blurred vision weakness, numbness, tingling. No other reported symptoms at this time.  Patient reports she had a hysterectomy in September and has since had no complications that she knows of          History provided by:  Patient   used: No    Flank Pain  Associated symptoms: cough, diarrhea, fever, nausea and vomiting    Associated symptoms: no chest pain, no chills, no constipation, no dysuria, no hematuria, no shortness of breath and no sore throat        Prior to Admission Medications   Prescriptions Last Dose Informant Patient Reported? Taking?   Multiple Vitamins-Minerals (Emergen-C Immune) PACK   Yes No   Sig: Take by mouth   acetaminophen (TYLENOL) 500 mg tablet   No No   Sig: Take 2 tablets (1,000 mg total) by mouth every 8 (eight) hours as needed for mild pain   albuterol (2.5 mg/3 mL) 0.083 % nebulizer solution   No No   Sig: Take 1  vial (2.5 mg total) by nebulization every 6 (six) hours as needed for wheezing or shortness of breath   Patient not taking: Reported on 2/20/2024   b complex vitamins capsule  Self Yes No   Sig: Take 1 capsule by mouth if needed Not daily   Patient not taking: Reported on 2/20/2024   ibuprofen (MOTRIN) 600 mg tablet  Self No No   Sig: Take 1 tablet (600 mg total) by mouth every 6 (six) hours as needed for mild pain, moderate pain, fever or headaches (cramping)   ibuprofen (MOTRIN) 800 mg tablet   No No   Sig: Take 1 tablet (800 mg total) by mouth every 8 (eight) hours as needed for mild pain   Patient not taking: Reported on 2/20/2024   loratadine (CLARITIN) 10 mg tablet   Yes No   Sig: Take 10 mg by mouth daily   Patient not taking: Reported on 2/20/2024   montelukast (SINGULAIR) 10 mg tablet   No No   Sig: take 1 tablet by mouth at bedtime   norethindrone-ethinyl estradiol (Loestrin Fe 1/20) 1-20 MG-MCG per tablet   No No   Sig: Take 1 tablet by mouth daily   Patient not taking: Reported on 2/20/2024   oxyCODONE (ROXICODONE) 5 immediate release tablet   No No   Sig: Take 1 tablet (5 mg total) by mouth every 6 (six) hours as needed for severe pain for up to 5 doses Max Daily Amount: 20 mg   Patient not taking: Reported on 2/20/2024      Facility-Administered Medications: None       Past Medical History:   Diagnosis Date    Anemia     Anemia during pregnancy.    Anxiety     Asthma     Cellulitis of abdominal wall 10/01/2019    Depression     Postpartum depression.    Kidney infection     Kidney stone     Miscarriage     MRSA infection     Postpartum depression 10/01/2019    Preeclampsia in postpartum period 09/16/2019    Rh incompatibility     Right flank pain 01/27/2020    Urinary tract infection     Varicella     Pt said she had chicken pox twice when she was younger.       Past Surgical History:   Procedure Laterality Date    ABDOMINAL ADHESION SURGERY N/A 9/1/2023    Procedure: LYSIS ADHESIONS;  Surgeon: Flora VAZQUEZ  MD Isaac;  [facility];  Service: Gynecology    ADENOIDECTOMY       SECTION      DILATION AND CURETTAGE OF UTERUS      [place]  DELIVERY ONLY N/A 9/3/2019    Procedure:  SECTION () REPEAT;  Surgeon: Rosa Ordonez DO;  Location: [facility];  Service: Obstetrics    NY CYSTOURETHROSCOPY N/A 2023    Procedure: CYSTOSCOPY;  Surgeon: Flora Gray MD;  [facility];  Service: Gynecology    [place] HYSTEROSCOPY BX ENDOMETRIUM&/POLYPC W/WO D&C N/A 3/2/2023    Procedure: DILATATION AND CURETTAGE (D&C) WITH HYSTEROSCOPY;  Surgeon: Flora Gray MD;  [facility];  Service: Gynecology    NY HYSTEROSCOPY BX ENDOMETRIUM&/POLYPC W/WO D&C N/A 3/2/2023    Procedure: POLYPECTOMY;  Surgeon: Flora Gray MD;  [facility];  Service: Gynecology    NY LAPS TOTAL HYSTERECT 250 GM/< W/RMVL TUBE/OVARY Bilateral 2023    Procedure: TOTAL LAPAROSCOPIC HYSTERECTOMY AND LEFT SALPINGECTOMY;  Surgeon: Flora Gray MD;  [facility];  Service: Gynecology    NY LIG/TRNSXJ FALOPIAN TUBE  DEL/ABDML SURG Bilateral 9/3/2019    Procedure: LIGATION/COAGULATION TUBAL;  Surgeon: Rosa Ordonez DO;  Location: [facility];  Service: Obstetrics    TONSILLECTOMY         Family History   Problem Relation Age of Onset    Diverticulitis Mother     Heart disease Father     Heart attack Father     Stroke Father     No Known Problems Daughter     No Known Problems Son     Diverticulitis Maternal Grandmother     Diabetes Maternal Grandfather     Aneurysm Maternal Grandfather     No Known Problems Paternal Grandmother     Heart disease Paternal Grandfather     Heart attack Paternal Grandfather     Heart disease Family     Crohn's disease Paternal Aunt      I have reviewed and agree with the history as documented.    E-Cigarette/Vaping    E-Cigarette Use Never User      E-Cigarette/Vaping Substances     Social History     Tobacco Use    Smoking status: Former      Types: Cigarettes    Smokeless tobacco: Never    Tobacco comments:     A few cigarettes a day   Vaping Use    Vaping status: Never Used   Substance Use Topics    Alcohol use: Yes     Alcohol/week: 2.0 - 3.0 standard drinks of alcohol     Types: 2 - 3 Cans of beer per week     Comment: 2x weekly-beer    Drug use: Yes     Types: Marijuana     Comment: occasionally       Review of Systems   Constitutional:  Positive for fever. Negative for chills.   HENT:  Positive for congestion. Negative for ear pain and sore throat.    Eyes:  Negative for pain and visual disturbance.   Respiratory:  Positive for cough. Negative for shortness of breath.    Cardiovascular:  Negative for chest pain and palpitations.   Gastrointestinal:  Positive for diarrhea, nausea and vomiting. Negative for abdominal distention, abdominal pain, anal bleeding, blood in stool, constipation and rectal pain.   Genitourinary:  Positive for flank pain. Negative for dysuria and hematuria.   Musculoskeletal:  Negative for arthralgias and back pain.   Skin:  Negative for color change and rash.   Neurological:  Negative for seizures and syncope.   All other systems reviewed and are negative.      Physical Exam  Physical Exam  Vitals reviewed.   Constitutional:       General: She is not in acute distress.     Appearance: Normal appearance. She is not ill-appearing.   HENT:      Head: Normocephalic and atraumatic.      Nose: Nose normal.      Mouth/Throat:      Mouth: Mucous membranes are moist.      Pharynx: Oropharynx is clear. No oropharyngeal exudate.   Eyes:      Conjunctiva/sclera: Conjunctivae normal.   Cardiovascular:      Rate and Rhythm: Normal rate.      Pulses: Normal pulses.   Pulmonary:      Effort: Pulmonary effort is normal. No respiratory distress.      Breath sounds: No stridor. No wheezing or rhonchi.   Abdominal:      General: Abdomen is flat. Bowel sounds are normal.      Palpations: Abdomen is soft.      Tenderness: There is no abdominal  tenderness. There is right CVA tenderness and left CVA tenderness. Negative signs include Cox's sign, Rovsing's sign, McBurney's sign and psoas sign.   Musculoskeletal:         General: Normal range of motion.      Cervical back: Normal range of motion and neck supple. No tenderness.   Skin:     General: Skin is warm and dry.      Capillary Refill: Capillary refill takes less than 2 seconds.      Coloration: Skin is not jaundiced.      Findings: No bruising.   Neurological:      Mental Status: She is alert and oriented to person, place, and time. Mental status is at baseline.         Vital Signs  ED Triage Vitals [03/27/24 1921]   Temperature Pulse Respirations Blood Pressure SpO2   98.5 °F (36.9 °C) 92 22 168/93 98 %      Temp Source Heart Rate Source Patient Position - Orthostatic VS BP Location FiO2 (%)   Oral Monitor Sitting Left arm --      Pain Score       --           Vitals:    03/27/24 1921   BP: 168/93   Pulse: 92   Patient Position - Orthostatic VS: Sitting         Visual Acuity      ED Medications  Medications   sodium chloride 0.9 % bolus 1,000 mL (0 mL Intravenous Stopped 3/27/24 2214)   ondansetron (ZOFRAN) injection 4 mg (4 mg Intravenous Given 3/27/24 2115)       Diagnostic Studies  Results Reviewed       Procedure Component Value Units Date/Time    Comprehensive metabolic panel [[id number]]  (Abnormal) Collected: 03/27/24 2144    Lab Status: Final result Specimen: Blood from Arm, Right Updated: 03/27/24 2218     Sodium 136 mmol/L      Potassium 4.4 mmol/L      Chloride 106 mmol/L      CO2 21 mmol/L      ANION GAP 9 mmol/L      BUN 5 mg/dL      Creatinine 0.51 mg/dL      Glucose 85 mg/dL      Calcium 8.1 mg/dL      AST 36 U/L      ALT 29 U/L      Alkaline Phosphatase 49 U/L      Total Protein 6.6 g/dL      Albumin 3.7 g/dL      Total Bilirubin 0.49 mg/dL      eGFR 125 ml/min/1.73sq m     Narrative:      National Kidney Disease Foundation guidelines for Chronic Kidney Disease (CKD):     Stage 1  with normal or high GFR (GFR > 90 mL/min/1.73 square meters)    Stage 2 Mild CKD (GFR = 60-89 mL/min/1.73 square meters)    Stage 3A Moderate CKD (GFR = 45-59 mL/min/1.73 square meters)    Stage 3B Moderate CKD (GFR = 30-44 mL/min/1.73 square meters)    Stage 4 Severe CKD (GFR = 15-29 mL/min/1.73 square meters)    Stage 5 End Stage CKD (GFR <15 mL/min/1.73 square meters)  Note: GFR calculation is accurate only with a steady state creatinine    Lipase [[id number]]  (Normal) Collected: 03/27/24 2144    Lab Status: Final result Specimen: Blood from Arm, Right Updated: 03/27/24 2218     Lipase 14 u/L     CBC and differential [[id number]]  (Abnormal) Collected: 03/27/24 2144    Lab Status: Final result Specimen: Blood from Arm, Right Updated: 03/27/24 2155     WBC 9.67 Thousand/uL      RBC 4.19 Million/uL      Hemoglobin 12.1 g/dL      Hematocrit 35.8 %      MCV 85 fL      MCH 28.9 pg      MCHC 33.8 g/dL      RDW 12.7 %      MPV 10.2 fL      Platelets 142 Thousands/uL      nRBC 0 /100 WBCs      Neutrophils Relative 76 %      Immature Grans % 0 %      Lymphocytes Relative 16 %      Monocytes Relative 8 %      Eosinophils Relative 0 %      Basophils Relative 0 %      Neutrophils Absolute 7.28 Thousands/µL      Absolute Immature Grans 0.03 Thousand/uL      Absolute Lymphocytes 1.55 Thousands/µL      Absolute Monocytes 0.76 Thousand/µL      Eosinophils Absolute 0.03 Thousand/µL      Basophils Absolute 0.02 Thousands/µL     FLU/RSV/COVID - if FLU/RSV clinically relevant [[id number]]  (Abnormal) Collected: 03/27/24 2104    Lab Status: Final result Specimen: Nares from Nose Updated: 03/27/24 2153     SARS-CoV-2 Negative     INFLUENZA A PCR Negative     INFLUENZA B PCR Positive     RSV PCR Negative    Narrative:      FOR PEDIATRIC PATIENTS - copy/paste COVID Guidelines URL to browser: [email or web address]    SARS-CoV-2 assay is a Nucleic Acid Amplification assay intended for  the  qualitative detection of nucleic acid from SARS-CoV-2 in nasopharyngeal  swabs. Results are for the presumptive identification of SARS-CoV-2 RNA.    Positive results are indicative of infection with SARS-CoV-2, the virus  causing COVID-19, but do not rule out bacterial infection or co-infection  with other viruses. Laboratories within the [place] and its  territories are required to report all positive results to the appropriate  public health authorities. Negative results do not preclude SARS-CoV-2  infection and should not be used as the sole basis for treatment or other  patient management decisions. Negative results must be combined with  clinical observations, patient history, and epidemiological information.  This test has not been FDA cleared or approved.    This test has been authorized by FDA under an Emergency Use Authorization  (EUA). This test is only authorized for the duration of time the  declaration that circumstances exist justifying the authorization of the  emergency use of an in vitro diagnostic tests for detection of SARS-CoV-2  virus and/or diagnosis of COVID-19 infection under section 564(b)(1) of  the Act, 21 U.S.C. 360bbb-3(b)(1), unless the authorization is terminated  or revoked sooner. The test has been validated but independent review by FDA  and CLIA is pending.    Test performed using [employer]: This RT-PCR assay targets N2,  a region unique to SARS-CoV-2. A conserved region in the E-gene was chosen  for pan-Sarbecovirus detection which includes SARS-CoV-2.    According to CMS-2020-01-R, this platform meets the definition of high-throughput technology.                   No orders to display              Procedures  Procedures         ED Course  ED Course as of 03/28/24 0730   Wed Mar 27, 2024   2205 INFLU B PCR(!): Positive   2212 Patient asking to leave due to her kids giving her  difficulty, discussed that I recommned she stay for the remainder of the tests  ordered and for further treatment, but patient insisted on leaving and states she will return if she needs to. Discussed that she has the flu and supportive care. Patient understands, is competent and not clinically intoxicated.                                              Medical Decision Making  This patient presents with symptoms suspicious for likely viral upper respiratory infection. Based on history and physical doubt sinusitis. COVID/FLU/RSV positive for influenza B. Do not suspect underlying cardiopulmonary process. I considered, but think unlikely, dangerous causes of this patient's symptoms to include ACS, CHF or COPD exacerbations, pneumonia, pneumothorax. Patient is nontoxic appearing and not in need of emergent medical intervention. Due to patient surgery history of hysterectomy in September and bilateral flank pain, will obtain CT of the abdomen and pelvis. Patient states that she would like to leave due to her kids giving her  a hard time. Discussed that she is indeed flu positive which could explain her symptoms, however I made it abundantly clear that she stay for the rest of the workup. Patient refused and would like to go home, I discussed the risks to this and recommended immediate return if anything were to worsen. Prior to discharge, discharge instructions were discussed with patient at bedside. Patient was provided both verbal and written instructions. Patient is understanding of the discharge instructions and is agreeable to plan of care. Return precautions were discussed with patient bedside, patient verbalized understanding of signs and symptoms that would necessitate return to the ED. All questions were answered. Patient was comfortable with the plan of care and discharged to home.         Problems Addressed:  Influenza B: acute illness or injury    Amount and/or Complexity of Data Reviewed  Labs:  Decision-making details documented in ED Course.  Radiology:  ordered.    Risk  Prescription drug management.             Disposition  Final diagnoses:   Influenza B     Time reflects when diagnosis was documented in both MDM as applicable and the Disposition within this note       Time User Action Codes Description Comment    3/27/2024 10:08 PM Jonah Lang Add [J10.1] Influenza B           ED Disposition       ED Disposition   Discharge    Condition   Stable    Date/Time   Wed Mar 27, 2024 10:08 PM    Comment   Marquita Person discharge to home/self care.                   Follow-up Information       Follow up With Specialties Details Why Contact Info Additional Information    Zoe Rivers DO Family Medicine Schedule an appointment as soon as possible for a visit   [phone]  [address]  [facility] [address] [phone]       Critical access hospital Emergency Department Emergency Medicine  If symptoms worsen [address] [address] [id number] [phone] Critical access hospital Emergency Department, [address], [place], [address]            Discharge Medication List as of 3/27/2024 10:08 PM        CONTINUE these medications which have NOT CHANGED    Details   acetaminophen (TYLENOL) 500 mg tablet Take 2 tablets (1,000 mg total) by mouth every 8 (eight) hours as needed for mild pain, Starting Fri 9/1/2023, Normal      albuterol (2.5 mg/3 mL) 0.083 % nebulizer solution Take 1 vial (2.5 mg total) by nebulization every 6 (six) hours as needed for wheezing or shortness of breath, Starting Tue 8/4/2020, Normal      b complex vitamins capsule Take 1 capsule by mouth if needed Not daily, Historical Med      !! ibuprofen (MOTRIN) 600 mg tablet Take 1 tablet (600 mg total) by mouth every 6 (six) hours as needed for mild pain, moderate pain, fever or headaches (cramping), Starting Fri 9/6/2019, Normal      !! ibuprofen (MOTRIN) 800 mg tablet Take 1 tablet (800 mg total) by mouth every 8 (eight) hours as needed for mild  pain, Starting Fri 9/1/2023, No Print      loratadine (CLARITIN) 10 mg tablet Take 10 mg by mouth daily, Historical Med      montelukast (SINGULAIR) 10 mg tablet take 1 tablet by mouth at bedtime, Normal      Multiple Vitamins-Minerals (Emergen-C Immune) PACK Take by mouth, Historical Med      norethindrone-ethinyl estradiol (Loestrin Fe 1/20) 1-20 MG-MCG per tablet Take 1 tablet by mouth daily, Starting Wed 7/26/2023, Normal      oxyCODONE (ROXICODONE) 5 immediate release tablet Take 1 tablet (5 mg total) by mouth every 6 (six) hours as needed for severe pain for up to 5 doses Max Daily Amount: 20 mg, Starting Fri 9/1/2023, Normal       !! - Potential duplicate medications found. Please discuss with provider.          No discharge procedures on file.    PDMP Review         Value Time User    PDMP Reviewed  Yes 4/8/2021 12:28 PM Zoe Rivers DO            ED Provider  Electronically Signed by             Jonah Lang PA-C  03/28/24 0945

## 2024-03-28 NOTE — DISCHARGE INSTRUCTIONS
Follow-up with your PCP.  Supportive cares discussed.  If any symptoms worsen or new symptoms appear return here immediately.

## 2025-01-27 ENCOUNTER — OFFICE VISIT (OUTPATIENT)
Dept: URGENT CARE | Facility: CLINIC | Age: 35
End: 2025-01-27
Payer: COMMERCIAL

## 2025-01-27 VITALS
DIASTOLIC BLOOD PRESSURE: 83 MMHG | OXYGEN SATURATION: 98 % | SYSTOLIC BLOOD PRESSURE: 126 MMHG | HEART RATE: 92 BPM | RESPIRATION RATE: 18 BRPM | TEMPERATURE: 97.9 F

## 2025-01-27 DIAGNOSIS — B37.9 ANTIBIOTIC-INDUCED YEAST INFECTION: ICD-10-CM

## 2025-01-27 DIAGNOSIS — T36.95XA ANTIBIOTIC-INDUCED YEAST INFECTION: ICD-10-CM

## 2025-01-27 DIAGNOSIS — J01.01 ACUTE RECURRENT MAXILLARY SINUSITIS: Primary | ICD-10-CM

## 2025-01-27 PROCEDURE — 99213 OFFICE O/P EST LOW 20 MIN: CPT

## 2025-01-27 RX ORDER — FLUCONAZOLE 150 MG/1
150 TABLET ORAL ONCE
Qty: 1 TABLET | Refills: 0 | Status: SHIPPED | OUTPATIENT
Start: 2025-01-27 | End: 2025-01-27

## 2025-01-27 RX ORDER — DOXYCYCLINE 100 MG/1
100 CAPSULE ORAL 2 TIMES DAILY
Qty: 14 CAPSULE | Refills: 0 | Status: SHIPPED | OUTPATIENT
Start: 2025-01-27 | End: 2025-02-03

## 2025-01-27 RX ORDER — METHYLPREDNISOLONE 4 MG/1
TABLET ORAL
Qty: 21 EACH | Refills: 0 | Status: SHIPPED | OUTPATIENT
Start: 2025-01-27

## 2025-01-28 NOTE — PROGRESS NOTES
[facility]        NAME: Marquita Person is a 34 y.o. female  : 1990    MRN: [id number]  DATE: 2025  TIME: 7:22 PM    Assessment and Plan   Acute recurrent maxillary sinusitis [J01.01]  1. Acute recurrent maxillary sinusitis  doxycycline monohydrate (MONODOX) 100 mg capsule    methylPREDNISolone 4 MG tablet therapy pack      2. Antibiotic-induced yeast infection  fluconazole (DIFLUCAN) 150 mg tablet        Antibiotics and steroids as directed given symptom duration >1 week with no response to OTC products. Diflucan as directed for per request for associated yeast infection with antibiotic use. VSS in clinic, appears in no acute distress. Advised close follow-up with PCP or to report to the ER if symptoms worsen. Patient verbalizes understanding and agreeable to plan.       Patient Instructions     Take antibiotic and steroid as directed for next week. Complete entire course of therapy even if symptoms improve and take medication with food to avoid upset stomach. Follow-up with PCP in 3-5 days if no improvement of symptoms. Report to the ER if symptoms worsen.       Chief Complaint     Chief Complaint   Patient presents with    sinus congestion     Sinus congestion and pressure x1 month. Has hx of allergies.          History of Present Illness       34 year old female presents for evaluation of sinus pressure and congestion x 1 month. She denies any known sick contacts but reports h/o seasonal allergies and persistent sinus infections in the past. She denies fevers, cough, or sore throat. She reports mild ear congestion. She has tried zyrtec, claritin-D, and nasal sprays with minimal symptom relief.    Sinusitis  This is a recurrent problem. The current episode started 1 to 4 weeks ago. The problem is unchanged. There has been no fever. Her pain is at a severity of 8/10. The pain is moderate. Associated symptoms include congestion, coughing, ear pain and sinus pressure. Pertinent  negatives include no chills, diaphoresis, headaches, hoarse voice, neck pain, shortness of breath, sneezing, sore throat or swollen glands. Past treatments include oral decongestants and nasal decongestants. The treatment provided no relief.       Review of Systems   Review of Systems   Constitutional:  Negative for activity change, appetite change, chills, diaphoresis, fatigue and fever.   HENT:  Positive for congestion, ear pain, postnasal drip, rhinorrhea and sinus pressure. Negative for hoarse voice, sinus pain, sneezing, sore throat and trouble swallowing.    Eyes:  Negative for visual disturbance.   Respiratory:  Positive for cough. Negative for chest tightness and shortness of breath.    Cardiovascular:  Negative for chest pain and palpitations.   Gastrointestinal:  Negative for abdominal pain, constipation, diarrhea, nausea and vomiting.   Musculoskeletal:  Negative for arthralgias, back pain, myalgias and neck pain.   Skin:  Negative for color change and pallor.   Allergic/Immunologic: Negative for environmental allergies and food allergies.   Neurological:  Negative for dizziness, light-headedness and headaches.         Current Medications       Current Outpatient Medications:     acetaminophen (TYLENOL) 500 mg tablet, Take 2 tablets (1,000 mg total) by mouth every 8 (eight) hours as needed for mild pain, Disp: 30 tablet, Rfl: 0    albuterol (2.5 mg/3 mL) 0.083 % nebulizer solution, Take 1 vial (2.5 mg total) by nebulization every 6 (six) hours as needed for wheezing or shortness of breath, Disp: 60 vial, Rfl: 1    b complex vitamins capsule, Take 1 capsule by mouth if needed Not daily, Disp: , Rfl:     doxycycline monohydrate (MONODOX) 100 mg capsule, Take 1 capsule (100 mg total) by mouth 2 (two) times a day for 7 days, Disp: 14 capsule, Rfl: 0    fluconazole (DIFLUCAN) 150 mg tablet, Take 1 tablet (150 mg total) by mouth once for 1 dose, Disp: 1 tablet, Rfl: 0    ibuprofen (MOTRIN) 600 mg tablet, Take 1  tablet (600 mg total) by mouth every 6 (six) hours as needed for mild pain, moderate pain, fever or headaches (cramping), Disp: 60 tablet, Rfl: 3    loratadine (CLARITIN) 10 mg tablet, Take 10 mg by mouth daily, Disp: , Rfl:     methylPREDNISolone 4 MG tablet therapy pack, Use as directed on package, Disp: 21 each, Rfl: 0    montelukast (SINGULAIR) 10 mg tablet, take 1 tablet by mouth at bedtime, Disp: 90 tablet, Rfl: 1    Multiple Vitamins-Minerals (Emergen-C Immune) PACK, Take by mouth, Disp: , Rfl:     ibuprofen (MOTRIN) 800 mg tablet, Take 1 tablet (800 mg total) by mouth every 8 (eight) hours as needed for mild pain (Patient not taking: Reported on 2/20/2024), Disp: 30 tablet, Rfl: 0    norethindrone-ethinyl estradiol (Loestrin Fe 1/20) 1-20 MG-MCG per tablet, Take 1 tablet by mouth daily (Patient not taking: Reported on 1/27/2025), Disp: 30 tablet, Rfl: 0    oxyCODONE (ROXICODONE) 5 immediate release tablet, Take 1 tablet (5 mg total) by mouth every 6 (six) hours as needed for severe pain for up to 5 doses Max Daily Amount: 20 mg (Patient not taking: Reported on 1/27/2025), Disp: 5 tablet, Rfl: 0    Current Allergies     Allergies as of 01/27/2025 - Reviewed 01/27/2025   Allergen Reaction Noted    Apple - food allergy Anaphylaxis 04/11/2017    Nuts - food allergy Other (See Comments) 04/11/2017    Pineapple - food allergy Anaphylaxis 04/11/2017    Shellfish allergy - food allergy Other (See Comments) 04/11/2017    Shellfish-derived products - food allergy Anaphylaxis 04/11/2017    Other Hives and Rash 04/11/2017    Raspberry - food allergy Hives and Rash 04/11/2017    Penicillins Rash 10/06/2013            The following portions of the patient's history were reviewed and updated as appropriate: allergies, current medications, past family history, past medical history, past social history, past surgical history and problem list.     Past Medical History:   Diagnosis Date    Anemia     Anemia during pregnancy.     Anxiety     Asthma     Cellulitis of abdominal wall 10/01/2019    Depression     Postpartum depression.    Kidney infection     Kidney stone     Miscarriage     MRSA infection     Postpartum depression 10/01/2019    Preeclampsia in postpartum period 2019    Rh incompatibility     Right flank pain 2020    Urinary tract infection     Varicella     Pt said she had chicken pox twice when she was younger.       Past Surgical History:   Procedure Laterality Date    ABDOMINAL ADHESION SURGERY N/A 2023    Procedure: LYSIS ADHESIONS;  Surgeon: Flora Gray MD;  [facility];  Service: Gynecology    ADENOIDECTOMY       SECTION      DILATION AND CURETTAGE OF UTERUS      CT  DELIVERY ONLY N/A 9/3/2019    Procedure:  SECTION () REPEAT;  Surgeon: Rosa Ordonez DO;  [facility];  Service: Obstetrics    CT CYSTOURETHROSCOPY N/A 2023    Procedure: CYSTOSCOPY;  Surgeon: Flora Gray MD;  [facility];  Service: Gynecology    CT HYSTEROSCOPY BX ENDOMETRIUM&/POLYPC W/WO D&C N/A 3/2/2023    Procedure: DILATATION AND CURETTAGE (D&C) WITH HYSTEROSCOPY;  Surgeon: Flora Gray MD;  [facility];  Service: Gynecology    CT HYSTEROSCOPY BX ENDOMETRIUM&/POLYPC W/WO D&C N/A 3/2/2023    Procedure: POLYPECTOMY;  Surgeon: Flora Gray MD;  [facility];  Service: Gynecology    CT LAPS TOTAL HYSTERECT 250 GM/< W/RMVL TUBE/OVARY Bilateral 2023    Procedure: TOTAL LAPAROSCOPIC HYSTERECTOMY AND LEFT SALPINGECTOMY;  Surgeon: Flora Gray MD;  [facility];  Service: Gynecology    CT LIG/TRNSXJ FALOPIAN TUBE  DEL/ABDML SURG Bilateral 9/3/2019    Procedure: LIGATION/COAGULATION TUBAL;  Surgeon: Rosa Ordonez DO;  [facility];  Service: Obstetrics    TONSILLECTOMY         Family History   Problem Relation Age of Onset    Diverticulitis Mother     Heart disease Father     Heart attack Father     Stroke  Father     No Known Problems Daughter     No Known Problems Son     Diverticulitis Maternal Grandmother     Diabetes Maternal Grandfather     Aneurysm Maternal Grandfather     No Known Problems Paternal Grandmother     Heart disease Paternal Grandfather     Heart attack Paternal Grandfather     Heart disease Family     Crohn's disease Paternal Aunt          Medications have been verified.        Objective   /83   Pulse 92   Temp 97.9 °F (36.6 °C)   Resp 18   LMP  (LMP Unknown) Comment: has been bleeding since March 2023  SpO2 98%        Physical Exam     Physical Exam  Vitals and nursing note reviewed.   Constitutional:       General: She is awake. She is not in acute distress.     Appearance: Normal appearance. She is well-developed and overweight.   HENT:      Head: Normocephalic and atraumatic.      Right Ear: Hearing, ear canal and external ear normal. A middle ear effusion is present.      Left Ear: Hearing, ear canal and external ear normal. A middle ear effusion is present.      Nose: Congestion and rhinorrhea present. Rhinorrhea is clear.      Right Turbinates: Enlarged. Not swollen or pale.      Left Turbinates: Enlarged. Not swollen or pale.      Right Sinus: Maxillary sinus tenderness present. No frontal sinus tenderness.      Left Sinus: Maxillary sinus tenderness present. No frontal sinus tenderness.      Mouth/Throat:      Lips: Pink.      Mouth: Mucous membranes are moist.      Pharynx: Oropharynx is clear. Uvula midline. Posterior oropharyngeal erythema and postnasal drip present. No oropharyngeal exudate.      Tonsils: No tonsillar exudate or tonsillar abscesses. 2+ on the right. 2+ on the left.   Eyes:      Conjunctiva/sclera: Conjunctivae normal.   Cardiovascular:      Rate and Rhythm: Normal rate and regular rhythm.      Pulses: Normal pulses.      Heart sounds: Normal heart sounds.   Pulmonary:      Effort: Pulmonary effort is normal.      Breath sounds: Normal breath sounds.    Musculoskeletal:      Cervical back: Full passive range of motion without pain, normal range of motion and neck supple.   Lymphadenopathy:      Cervical: No cervical adenopathy.   Skin:     General: Skin is warm and dry.   Neurological:      General: No focal deficit present.      Mental Status: She is alert and oriented to person, place, and time.   Psychiatric:         Mood and Affect: Mood normal.         Behavior: Behavior normal. Behavior is cooperative.         Thought Content: Thought content normal.         Judgment: Judgment normal.

## 2025-01-28 NOTE — PATIENT INSTRUCTIONS
Take antibiotic and steroid as directed for next week. Complete entire course of therapy even if symptoms improve and take medication with food to avoid upset stomach. Follow-up with PCP in 3-5 days if no improvement of symptoms. Report to the ER if symptoms worsen.

## 2025-03-04 ENCOUNTER — OFFICE VISIT (OUTPATIENT)
Dept: FAMILY MEDICINE CLINIC | Facility: CLINIC | Age: 35
End: 2025-03-04
Payer: COMMERCIAL

## 2025-03-04 VITALS
BODY MASS INDEX: 42.1 KG/M2 | SYSTOLIC BLOOD PRESSURE: 136 MMHG | HEIGHT: 61 IN | DIASTOLIC BLOOD PRESSURE: 88 MMHG | HEART RATE: 76 BPM | OXYGEN SATURATION: 96 % | WEIGHT: 223 LBS | TEMPERATURE: 98.6 F

## 2025-03-04 DIAGNOSIS — Z13.1 SCREENING FOR DIABETES MELLITUS (DM): ICD-10-CM

## 2025-03-04 DIAGNOSIS — R00.2 PALPITATIONS: ICD-10-CM

## 2025-03-04 DIAGNOSIS — H60.543 ECZEMA OF BOTH EXTERNAL EARS: ICD-10-CM

## 2025-03-04 DIAGNOSIS — Z13.220 SCREENING FOR LIPID DISORDERS: ICD-10-CM

## 2025-03-04 DIAGNOSIS — R53.83 OTHER FATIGUE: ICD-10-CM

## 2025-03-04 DIAGNOSIS — Z90.710 STATUS POST HYSTERECTOMY: ICD-10-CM

## 2025-03-04 DIAGNOSIS — Z00.00 ANNUAL PHYSICAL EXAM: Primary | ICD-10-CM

## 2025-03-04 PROCEDURE — 99395 PREV VISIT EST AGE 18-39: CPT

## 2025-03-04 PROCEDURE — 99214 OFFICE O/P EST MOD 30 MIN: CPT

## 2025-03-04 PROCEDURE — 93000 ELECTROCARDIOGRAM COMPLETE: CPT

## 2025-03-04 RX ORDER — TRIAMCINOLONE ACETONIDE 1 MG/G
CREAM TOPICAL 2 TIMES DAILY
Qty: 15 G | Refills: 1 | Status: SHIPPED | OUTPATIENT
Start: 2025-03-04

## 2025-03-04 NOTE — PROGRESS NOTES
Adult Annual Physical  Name: Marquita Person      : 1990      MRN: [id number]  Encounter Provider: Albert Esqueda PA-C  Encounter Date: 3/4/2025   Encounter department: [facility]    Assessment & Plan  Annual physical exam         Other fatigue  Pt states that she has felt more fatigued 6 months  Actively works and denies anxiety, does note subjectively note a cause  Denies depression as well, PHQ reviewed scored 1  Exam is unremarkable, no rashes, sign of infection, lungs CTA  Desires fatigue workup, denies desire to start medication for anxiety at this time  We will proceed with fatigue lab workup as well as routine blood work, if all negative will further discuss role of anxiety and current symptoms as well as lifestyle modification  Orders:    TSH, 3rd generation with Free T4 reflex; Future    Comprehensive metabolic panel; Future    CBC (Includes Diff/Plt) (Refl); Future    Vitamin D 25 hydroxy; Future    Vitamin B12; Future    Eczema of both external ears  Chronic since childhood, notes recent worsening present on her hands bilaterally  Exam reveals dry flaking skin of the hands bilaterally  Per patient she has tried topical emollients and moisturizers with some relief  After discussing risk and benefits we will start triamcinolone 0.1% 2 times daily for 2 weeks, advised patient assess response at this time use topical emollients in conjunction with this medication  Follow-up as needed  Orders:    triamcinolone (KENALOG) 0.1 % cream; Apply topically 2 (two) times a day Use daily for about 2 weeks and assess response    Palpitations  Relates to anxiety, but states that her anxiety is stable control and that she does not desire medication  ECG ordered in office with NSR, unremarkable  Suspect may be in the setting of anxiety as it is intermittent and self-limiting, but will also indirectly evaluate with current lab workup including TSH and routine labs  Follow-up as needed  "advised with continued palpitations, chest pain, shortness of breath, difficulty breathing she should seek higher level of care and she is in agreement current plan  Orders:    POCT ECG    TSH, 3rd generation with Free T4 reflex; Future    Status post hysterectomy    Orders:    Ambulatory Referral to Obstetrics / Gynecology; Future    Screening for diabetes mellitus (DM)    Orders:    Hemoglobin A1C; Future    Screening for lipid disorders    Orders:    Lipid panel; Future    Immunizations and preventive care screenings were discussed with patient today. Appropriate education was printed on patient's after visit summary.    Counseling:  Dental Health: discussed importance of regular tooth brushing, flossing, and dental visits.  Exercise: the importance of regular exercise/physical activity was discussed. Recommend exercise 3-5 times per week for at least 30 minutes.          History of Present Illness     Adult Annual Physical:  Patient presents for annual physical. Marquita Person is a 35 y.o. female  presenting for concerns of ongoing fatigue, and is also due for annual.  She cannot fully articulate as to why she feels fatigued, but notes that he has been present for around the last 6 months and she would like blood test to evaluate further.        **Note: Portions of the record may have been created with voice recognition software.  Occasional wrong word or \"sound alike\" substitutions may have occurred due to the inherent limitations of voice recognition software.  Please read the chart carefully and recognize, using context, where substitutions have occurred. Please contact for further clarification, when necessary.   .     Diet and Physical Activity:  - Diet/Nutrition: well balanced diet.  - Exercise: strength training exercises and walking.    Depression Screening:    - PHQ-9 Score: 1    General Health:  - Sleep: sleeps well.  - Hearing: normal hearing right ear and normal hearing left ear.  - Vision: no " vision problems.  - Dental: regular dental visits.    /GYN Health:  - Follows with GYN: no.     Review of Systems   Constitutional:  Positive for fatigue. Negative for chills and fever.   HENT:  Negative for ear pain and sore throat.    Eyes:  Negative for pain and visual disturbance.   Respiratory:  Negative for cough and shortness of breath.    Cardiovascular:  Negative for chest pain and palpitations.   Gastrointestinal:  Negative for abdominal pain and vomiting.   Genitourinary:  Negative for dysuria and hematuria.   Musculoskeletal:  Negative for arthralgias and back pain.   Skin:  Negative for color change and rash.   Neurological:  Negative for seizures and syncope.   Psychiatric/Behavioral:  The patient is nervous/anxious (intermittent).    All other systems reviewed and are negative.    Medical History Reviewed by provider this encounter:     .  Past Medical History   Past Medical History:   Diagnosis Date    Anemia     Anemia during pregnancy.    Anxiety     Asthma     Cellulitis of abdominal wall 10/01/2019    Depression     Postpartum depression.    Kidney infection     Kidney stone     Miscarriage     MRSA infection     Postpartum depression 10/01/2019    Preeclampsia in postpartum period 2019    Rh incompatibility     Right flank pain 2020    Urinary tract infection     Varicella     Pt said she had chicken pox twice when she was younger.     Past Surgical History:   Procedure Laterality Date    ABDOMINAL ADHESION SURGERY N/A 2023    Procedure: LYSIS ADHESIONS;  Surgeon: Flora Gray MD;  [facility];  Service: Gynecology    ADENOIDECTOMY       SECTION      DILATION AND CURETTAGE OF UTERUS      NH  DELIVERY ONLY N/A 9/3/2019    Procedure:  SECTION () REPEAT;  Surgeon: Rosa Ordonez DO;  [facility] ;  Service: Obstetrics    NH CYSTOURETHROSCOPY N/A 2023    Procedure: CYSTOSCOPY;  Surgeon: Flora Gray MD;  [facility];  Service: Gynecology    IA HYSTEROSCOPY BX ENDOMETRIUM&/POLYPC W/WO D&C N/A 3/2/2023    Procedure: DILATATION AND CURETTAGE (D&C) WITH HYSTEROSCOPY;  Surgeon: Flora Gray MD;  [facility];  Service: Gynecology    IA HYSTEROSCOPY BX ENDOMETRIUM&/POLYPC W/WO D&C N/A 3/2/2023    Procedure: POLYPECTOMY;  Surgeon: Flora Gray MD;  [facility];  Service: Gynecology    IA LAPS TOTAL HYSTERECT 250 GM/< W/RMVL TUBE/OVARY Bilateral 2023    Procedure: TOTAL LAPAROSCOPIC HYSTERECTOMY AND LEFT SALPINGECTOMY;  Surgeon: Flora Gray MD;  [facility];  Service: Gynecology    IA LIG/TRNSXJ FALOPIAN TUBE  DEL/ABDML SURG Bilateral 9/3/2019    Procedure: LIGATION/COAGULATION TUBAL;  Surgeon: Rosa Ordonez DO;  [facility];  Service: Obstetrics    TONSILLECTOMY       Family History   Problem Relation Age of Onset    Diverticulitis Mother     Heart disease Father     Heart attack Father     Stroke Father     No Known Problems Daughter     No Known Problems Son     Diverticulitis Maternal Grandmother     Diabetes Maternal Grandfather     Aneurysm Maternal Grandfather     No Known Problems Paternal Grandmother     Heart disease Paternal Grandfather     Heart attack Paternal Grandfather     Heart disease Family     Crohn's disease Paternal Aunt       reports that she has quit smoking. Her smoking use included cigarettes. She has never used smokeless tobacco. She reports current alcohol use of about 2.0 - 3.0 standard drinks of alcohol per week. She reports that she does not currently use drugs after having used the following drugs: Marijuana.  Current Outpatient Medications   Medication Instructions    acetaminophen (TYLENOL) 1,000 mg, Oral, Every 8 hours PRN    albuterol 2.5 mg, Nebulization, Every 6 hours PRN    b complex vitamins capsule 1 capsule, As needed    ibuprofen (MOTRIN) 600 mg, Oral, Every 6 hours PRN    loratadine (CLARITIN) 10 mg, Daily     "montelukast (SINGULAIR) 10 mg tablet take 1 tablet by mouth at bedtime    Multiple Vitamins-Minerals (Emergen-C Immune) PACK Take by mouth     Allergies   Allergen Reactions    Apple - Food Allergy Anaphylaxis     Tolerates apple juice, takes bite without peel and tolerates    Nuts - Food Allergy Other (See Comments)     \"itchy throat and difficulty swallowing.\"  Eats walnuts, macadamia, almonds, brazil nuts when roasted    Pineapple - Food Allergy Anaphylaxis    Shellfish Allergy - Food Allergy Other (See Comments)     \"itchy throat and difficulty swallowing.\"    Shellfish-Derived Products - Food Allergy Anaphylaxis    Other Hives and Rash     Black berries     Raspberry - Food Allergy Hives and Rash    Penicillins Rash     Tolerates cefazolin.      Current Outpatient Medications on File Prior to Visit   Medication Sig Dispense Refill    acetaminophen (TYLENOL) 500 mg tablet Take 2 tablets (1,000 mg total) by mouth every 8 (eight) hours as needed for mild pain 30 tablet 0    albuterol (2.5 mg/3 mL) 0.083 % nebulizer solution Take 1 vial (2.5 mg total) by nebulization every 6 (six) hours as needed for wheezing or shortness of breath 60 vial 1    b complex vitamins capsule Take 1 capsule by mouth if needed Not daily      ibuprofen (MOTRIN) 600 mg tablet Take 1 tablet (600 mg total) by mouth every 6 (six) hours as needed for mild pain, moderate pain, fever or headaches (cramping) 60 tablet 3    loratadine (CLARITIN) 10 mg tablet Take 10 mg by mouth daily      montelukast (SINGULAIR) 10 mg tablet take 1 tablet by mouth at bedtime 90 tablet 1    Multiple Vitamins-Minerals (Emergen-C Immune) PACK Take by mouth      [DISCONTINUED] ibuprofen (MOTRIN) 800 mg tablet Take 1 tablet (800 mg total) by mouth every 8 (eight) hours as needed for mild pain (Patient not taking: Reported on 2/20/2024) 30 tablet 0    [DISCONTINUED] methylPREDNISolone 4 MG tablet therapy pack Use as directed on package 21 each 0    [DISCONTINUED] " norethindrone-ethinyl estradiol (Loestrin Fe 1/20) 1-20 MG-MCG per tablet Take 1 tablet by mouth daily (Patient not taking: Reported on 1/27/2025) 30 tablet 0    [DISCONTINUED] oxyCODONE (ROXICODONE) 5 immediate release tablet Take 1 tablet (5 mg total) by mouth every 6 (six) hours as needed for severe pain for up to 5 doses Max Daily Amount: 20 mg (Patient not taking: Reported on 1/27/2025) 5 tablet 0     No current facility-administered medications on file prior to visit.      Social History     Tobacco Use    Smoking status: Former     Types: Cigarettes    Smokeless tobacco: Never    Tobacco comments:     A few cigarettes a day   Vaping Use    Vaping status: Never Used   Substance and Sexual Activity    Alcohol use: Yes     Alcohol/week: 2.0 - 3.0 standard drinks of alcohol     Types: 2 - 3 Cans of beer per week     Comment: 2x weekly-beer    Drug use: Not Currently     Types: Marijuana     Comment: occasionally    Sexual activity: Yes     Partners: Male     Birth control/protection: Female Sterilization       Objective   LMP  (LMP Unknown) Comment: has been bleeding since March 2023    Physical Exam  Vitals and nursing note reviewed.   Constitutional:       General: She is not in acute distress.     Appearance: She is well-developed.   HENT:      Head: Normocephalic and atraumatic.      Right Ear: Tympanic membrane normal.      Left Ear: Tympanic membrane normal.      Nose: Nose normal.      Mouth/Throat:      Pharynx: Oropharynx is clear.   Eyes:      Conjunctiva/sclera: Conjunctivae normal.      Pupils: Pupils are equal, round, and reactive to light.   Cardiovascular:      Rate and Rhythm: Normal rate and regular rhythm.      Heart sounds: No murmur heard.  Pulmonary:      Effort: Pulmonary effort is normal. No respiratory distress.      Breath sounds: Normal breath sounds. No wheezing.   Abdominal:      General: Abdomen is flat. There is no distension.   Musculoskeletal:         General: No swelling.       Cervical back: Neck supple.   Skin:     General: Skin is warm and dry.   Neurological:      Mental Status: She is alert and oriented to person, place, and time.   Psychiatric:         Mood and Affect: Mood normal.

## 2025-03-14 ENCOUNTER — APPOINTMENT (OUTPATIENT)
Dept: LAB | Facility: CLINIC | Age: 35
End: 2025-03-14
Payer: COMMERCIAL

## 2025-03-14 DIAGNOSIS — Z13.220 SCREENING FOR LIPID DISORDERS: ICD-10-CM

## 2025-03-14 DIAGNOSIS — R53.83 OTHER FATIGUE: ICD-10-CM

## 2025-03-14 DIAGNOSIS — Z13.1 SCREENING FOR DIABETES MELLITUS (DM): ICD-10-CM

## 2025-03-14 DIAGNOSIS — R00.2 PALPITATIONS: ICD-10-CM

## 2025-03-14 LAB
25(OH)D3 SERPL-MCNC: 43.2 NG/ML (ref 30–100)
ALBUMIN SERPL BCG-MCNC: 4.3 G/DL (ref 3.5–5)
ALP SERPL-CCNC: 57 U/L (ref 34–104)
ALT SERPL W P-5'-P-CCNC: 25 U/L (ref 7–52)
ANION GAP SERPL CALCULATED.3IONS-SCNC: 8 MMOL/L (ref 4–13)
AST SERPL W P-5'-P-CCNC: 18 U/L (ref 13–39)
BASOPHILS # BLD AUTO: 0.03 THOUSANDS/ÂΜL (ref 0–0.1)
BASOPHILS NFR BLD AUTO: 0 % (ref 0–1)
BILIRUB SERPL-MCNC: 0.47 MG/DL (ref 0.2–1)
BUN SERPL-MCNC: 11 MG/DL (ref 5–25)
CALCIUM SERPL-MCNC: 9.5 MG/DL (ref 8.4–10.2)
CHLORIDE SERPL-SCNC: 103 MMOL/L (ref 96–108)
CHOLEST SERPL-MCNC: 229 MG/DL (ref ?–200)
CO2 SERPL-SCNC: 26 MMOL/L (ref 21–32)
CREAT SERPL-MCNC: 0.62 MG/DL (ref 0.6–1.3)
EOSINOPHIL # BLD AUTO: 0.12 THOUSAND/ÂΜL (ref 0–0.61)
EOSINOPHIL NFR BLD AUTO: 2 % (ref 0–6)
ERYTHROCYTE [DISTWIDTH] IN BLOOD BY AUTOMATED COUNT: 12.9 % (ref 11.6–15.1)
GFR SERPL CREATININE-BSD FRML MDRD: 117 ML/MIN/1.73SQ M
GLUCOSE P FAST SERPL-MCNC: 100 MG/DL (ref 65–99)
HCT VFR BLD AUTO: 40.8 % (ref 34.8–46.1)
HDLC SERPL-MCNC: 55 MG/DL
HGB BLD-MCNC: 13.3 G/DL (ref 11.5–15.4)
IMM GRANULOCYTES # BLD AUTO: 0.02 THOUSAND/UL (ref 0–0.2)
IMM GRANULOCYTES NFR BLD AUTO: 0 % (ref 0–2)
LDLC SERPL CALC-MCNC: 144 MG/DL (ref 0–100)
LYMPHOCYTES # BLD AUTO: 1.48 THOUSANDS/ÂΜL (ref 0.6–4.47)
LYMPHOCYTES NFR BLD AUTO: 22 % (ref 14–44)
MCH RBC QN AUTO: 28.9 PG (ref 26.8–34.3)
MCHC RBC AUTO-ENTMCNC: 32.6 G/DL (ref 31.4–37.4)
MCV RBC AUTO: 89 FL (ref 82–98)
MONOCYTES # BLD AUTO: 0.53 THOUSAND/ÂΜL (ref 0.17–1.22)
MONOCYTES NFR BLD AUTO: 8 % (ref 4–12)
NEUTROPHILS # BLD AUTO: 4.59 THOUSANDS/ÂΜL (ref 1.85–7.62)
NEUTS SEG NFR BLD AUTO: 68 % (ref 43–75)
NONHDLC SERPL-MCNC: 174 MG/DL
NRBC BLD AUTO-RTO: 0 /100 WBCS
PLATELET # BLD AUTO: 223 THOUSANDS/UL (ref 149–390)
PMV BLD AUTO: 10.8 FL (ref 8.9–12.7)
POTASSIUM SERPL-SCNC: 4.2 MMOL/L (ref 3.5–5.3)
PROT SERPL-MCNC: 6.9 G/DL (ref 6.4–8.4)
RBC # BLD AUTO: 4.61 MILLION/UL (ref 3.81–5.12)
SODIUM SERPL-SCNC: 137 MMOL/L (ref 135–147)
TRIGL SERPL-MCNC: 151 MG/DL (ref ?–150)
TSH SERPL DL<=0.05 MIU/L-ACNC: 1.16 UIU/ML (ref 0.45–4.5)
VIT B12 SERPL-MCNC: 389 PG/ML (ref 180–914)
WBC # BLD AUTO: 6.77 THOUSAND/UL (ref 4.31–10.16)

## 2025-03-14 PROCEDURE — 82306 VITAMIN D 25 HYDROXY: CPT

## 2025-03-14 PROCEDURE — 85025 COMPLETE CBC W/AUTO DIFF WBC: CPT

## 2025-03-14 PROCEDURE — 84443 ASSAY THYROID STIM HORMONE: CPT

## 2025-03-14 PROCEDURE — 36415 COLL VENOUS BLD VENIPUNCTURE: CPT

## 2025-03-14 PROCEDURE — 83036 HEMOGLOBIN GLYCOSYLATED A1C: CPT

## 2025-03-14 PROCEDURE — 80061 LIPID PANEL: CPT

## 2025-03-14 PROCEDURE — 82607 VITAMIN B-12: CPT

## 2025-03-14 PROCEDURE — 80053 COMPREHEN METABOLIC PANEL: CPT

## 2025-03-15 LAB
EST. AVERAGE GLUCOSE BLD GHB EST-MCNC: 114 MG/DL
HBA1C MFR BLD: 5.6 %

## 2025-03-17 ENCOUNTER — RESULTS FOLLOW-UP (OUTPATIENT)
Dept: FAMILY MEDICINE CLINIC | Facility: CLINIC | Age: 35
End: 2025-03-17

## 2025-03-18 ENCOUNTER — TELEPHONE (OUTPATIENT)
Age: 35
End: 2025-03-18

## 2025-04-22 ENCOUNTER — TELEPHONE (OUTPATIENT)
Age: 35
End: 2025-04-22

## 2025-04-22 PROBLEM — U07.1 COVID-19: Status: RESOLVED | Noted: 2021-11-08 | Resolved: 2025-04-22

## 2025-04-22 NOTE — TELEPHONE ENCOUNTER
If patient is having worsening rash that is changed since being last seen, I would recommend a follow-up in person for further evaluation.

## 2025-04-22 NOTE — TELEPHONE ENCOUNTER
Pt called stating she was seen back in March by Albert and prescribed Triamcinolone cream for eczema on ears. States she is on the second tube and now the eczema has spread all over her body. Pt believes this may be due to the medication and asking for further recommendations. Pt states she is very itchy and miserable.     Please advise

## (undated) DEVICE — PACK C-SECTION PBDS

## (undated) DEVICE — PENCIL ELECTROSURG E-Z CLEAN -0035H

## (undated) DEVICE — ABDOMINAL PAD: Brand: DERMACEA

## (undated) DEVICE — OCCLUDER COLPO-PNEUMO

## (undated) DEVICE — BETHLEHEM UNIVERSAL GYN LAP PK: Brand: CARDINAL HEALTH

## (undated) DEVICE — GLOVE PI ULTRA TOUCH SZ.6.5

## (undated) DEVICE — ENDOPATH BIPOLAR FORCEPS WITH MACRO JAW: Brand: ENDOPATH

## (undated) DEVICE — ELECTRODE LAP SPATULA CRV E-Z CLEAN 33CM -0018C

## (undated) DEVICE — GAUZE SPONGES,16 PLY: Brand: CURITY

## (undated) DEVICE — GLOVE SRG LF STRL BGL SKNSNS 6 PF

## (undated) DEVICE — LIGHT HANDLE COVER SLEEVE DISP BLUE STELLAR

## (undated) DEVICE — TROCAR: Brand: KII FIOS FIRST ENTRY

## (undated) DEVICE — CHLORHEXIDINE 4PCT 4 OZ

## (undated) DEVICE — MAYO STAND COVER: Brand: CONVERTORS

## (undated) DEVICE — TROCAR: Brand: KII SLEEVE

## (undated) DEVICE — SUT MONOCRYL 4-0 PS-2 27 IN Y426H

## (undated) DEVICE — GLOVE INDICATOR PI UNDERGLOVE SZ 6.5 BLUE

## (undated) DEVICE — STERILE SURGICAL LUBRICANT,  TUBE: Brand: SURGILUBE

## (undated) DEVICE — SUT VICRYL 0 UR-6 27 IN J603H

## (undated) DEVICE — TRAY FOLEY 16FR URIMETER SILICONE SURESTEP

## (undated) DEVICE — CYSTO TUBING SINGLE IRRIGATION

## (undated) DEVICE — SYRINGE 50ML LL

## (undated) DEVICE — SUT MONOCRYL 0 CTX 36 IN Y398H

## (undated) DEVICE — SUT VICRYL 4-0 KS 27 IN J662H

## (undated) DEVICE — INSUFFLATION NEEDLE TO ESTABLISH PNEUMOPERITONEUM.: Brand: INSUFFLATION NEEDLE

## (undated) DEVICE — INSUFLATION TUBING INSUFLOW (LEXION)

## (undated) DEVICE — CHLORAPREP HI-LITE 26ML ORANGE

## (undated) DEVICE — SUT PLAIN 2-0 CTX 27 IN 872H

## (undated) DEVICE — SYRINGE 10ML LL

## (undated) DEVICE — Device

## (undated) DEVICE — ENDOPOUCH RETRIEVER SPECIMEN RETRIEVAL BAGS: Brand: ENDOPOUCH RETRIEVER

## (undated) DEVICE — INTENDED FOR TISSUE SEPARATION, AND OTHER PROCEDURES THAT REQUIRE A SHARP SURGICAL BLADE TO PUNCTURE OR CUT.: Brand: BARD-PARKER SAFETY BLADES SIZE 11, STERILE

## (undated) DEVICE — ADHESIVE SKIN HIGH VISCOSITY EXOFIN 1ML

## (undated) DEVICE — TELFA NON-ADHERENT ABSORBENT DRESSING: Brand: TELFA

## (undated) DEVICE — PVC URETHRAL CATHETER: Brand: DOVER

## (undated) DEVICE — HARMONIC 1100 SHEARS, 36CM SHAFT LENGTH: Brand: HARMONIC

## (undated) DEVICE — SPONGE 4 X 4 XRAY 16 PLY STRL LF RFD

## (undated) DEVICE — UTERINE MANIPULATOR RUMI 6.7 X 10 CM

## (undated) DEVICE — SUT VICRYL 0 CTX 36 IN J978H

## (undated) DEVICE — SKIN MARKER DUAL TIP WITH RULER CAP, FLEXIBLE RULER AND LABELS: Brand: DEVON

## (undated) DEVICE — 1820 FOAM BLOCK NEEDLE COUNTER: Brand: DEVON

## (undated) DEVICE — PREMIUM DRY TRAY LF: Brand: MEDLINE INDUSTRIES, INC.

## (undated) DEVICE — GLOVE INDICATOR PI UNDERGLOVE SZ 7 BLUE

## (undated) DEVICE — SPONGE SCRUB 4 PCT CHLORHEXIDINE

## (undated) DEVICE — IRRIG ENDO FLO TUBING